# Patient Record
Sex: MALE | Race: WHITE | NOT HISPANIC OR LATINO | Employment: OTHER | URBAN - METROPOLITAN AREA
[De-identification: names, ages, dates, MRNs, and addresses within clinical notes are randomized per-mention and may not be internally consistent; named-entity substitution may affect disease eponyms.]

---

## 2017-02-28 ENCOUNTER — COMMUNICATION - HEALTHEAST (OUTPATIENT)
Dept: FAMILY MEDICINE | Facility: CLINIC | Age: 67
End: 2017-02-28

## 2017-02-28 DIAGNOSIS — I10 ESSENTIAL HYPERTENSION: ICD-10-CM

## 2017-03-13 ENCOUNTER — COMMUNICATION - HEALTHEAST (OUTPATIENT)
Dept: ENDOCRINOLOGY | Facility: CLINIC | Age: 67
End: 2017-03-13

## 2017-03-13 DIAGNOSIS — E11.9 TYPE 2 DIABETES MELLITUS (H): ICD-10-CM

## 2017-03-15 ENCOUNTER — RECORDS - HEALTHEAST (OUTPATIENT)
Dept: ADMINISTRATIVE | Facility: OTHER | Age: 67
End: 2017-03-15

## 2017-03-20 ENCOUNTER — OFFICE VISIT - HEALTHEAST (OUTPATIENT)
Dept: EDUCATION SERVICES | Facility: CLINIC | Age: 67
End: 2017-03-20

## 2017-03-20 DIAGNOSIS — E11.9 DIABETES (H): ICD-10-CM

## 2017-03-20 LAB — HBA1C MFR BLD: 10.9 % (ref 3.5–6)

## 2017-03-21 ENCOUNTER — OFFICE VISIT - HEALTHEAST (OUTPATIENT)
Dept: INTERNAL MEDICINE | Facility: CLINIC | Age: 67
End: 2017-03-21

## 2017-03-21 DIAGNOSIS — E11.9 TYPE 2 DIABETES MELLITUS WITHOUT COMPLICATION, WITHOUT LONG-TERM CURRENT USE OF INSULIN (H): ICD-10-CM

## 2017-03-22 ENCOUNTER — COMMUNICATION - HEALTHEAST (OUTPATIENT)
Dept: FAMILY MEDICINE | Facility: CLINIC | Age: 67
End: 2017-03-22

## 2017-04-11 ENCOUNTER — OFFICE VISIT - HEALTHEAST (OUTPATIENT)
Dept: INTERNAL MEDICINE | Facility: CLINIC | Age: 67
End: 2017-04-11

## 2017-04-11 DIAGNOSIS — I10 ESSENTIAL HYPERTENSION: ICD-10-CM

## 2017-04-11 DIAGNOSIS — Z00.00 HEALTHCARE MAINTENANCE: ICD-10-CM

## 2017-04-11 DIAGNOSIS — E11.9 T2DM (TYPE 2 DIABETES MELLITUS) (H): ICD-10-CM

## 2017-04-11 DIAGNOSIS — E11.9 TYPE 2 DIABETES MELLITUS WITHOUT COMPLICATION, WITHOUT LONG-TERM CURRENT USE OF INSULIN (H): ICD-10-CM

## 2017-04-11 DIAGNOSIS — N40.0 BPH (BENIGN PROSTATIC HYPERPLASIA): ICD-10-CM

## 2017-04-18 ENCOUNTER — RECORDS - HEALTHEAST (OUTPATIENT)
Dept: ADMINISTRATIVE | Facility: OTHER | Age: 67
End: 2017-04-18

## 2017-05-03 ENCOUNTER — COMMUNICATION - HEALTHEAST (OUTPATIENT)
Dept: FAMILY MEDICINE | Facility: CLINIC | Age: 67
End: 2017-05-03

## 2017-05-03 DIAGNOSIS — E11.9 TYPE 2 DIABETES MELLITUS WITHOUT COMPLICATION, WITHOUT LONG-TERM CURRENT USE OF INSULIN (H): ICD-10-CM

## 2017-06-16 ENCOUNTER — COMMUNICATION - HEALTHEAST (OUTPATIENT)
Dept: FAMILY MEDICINE | Facility: CLINIC | Age: 67
End: 2017-06-16

## 2017-06-16 DIAGNOSIS — I10 ESSENTIAL HYPERTENSION: ICD-10-CM

## 2017-06-20 ENCOUNTER — OFFICE VISIT - HEALTHEAST (OUTPATIENT)
Dept: FAMILY MEDICINE | Facility: CLINIC | Age: 67
End: 2017-06-20

## 2017-06-20 DIAGNOSIS — E11.9 TYPE 2 DIABETES MELLITUS WITHOUT COMPLICATION, WITHOUT LONG-TERM CURRENT USE OF INSULIN (H): ICD-10-CM

## 2017-06-20 DIAGNOSIS — Z97.4 WEARS HEARING AID: ICD-10-CM

## 2017-06-20 DIAGNOSIS — I10 ESSENTIAL HYPERTENSION: ICD-10-CM

## 2017-06-20 DIAGNOSIS — E78.5 HYPERLIPIDEMIA: ICD-10-CM

## 2017-06-20 DIAGNOSIS — E55.9 VITAMIN D DEFICIENCY: ICD-10-CM

## 2017-06-20 LAB
CHOLEST SERPL-MCNC: 124 MG/DL
FASTING STATUS PATIENT QL REPORTED: YES
HBA1C MFR BLD: 7 % (ref 3.5–6)
HDLC SERPL-MCNC: 47 MG/DL
LDLC SERPL CALC-MCNC: 65 MG/DL
TRIGL SERPL-MCNC: 58 MG/DL

## 2017-06-20 ASSESSMENT — MIFFLIN-ST. JEOR: SCORE: 1814.52

## 2017-06-20 NOTE — ASSESSMENT & PLAN NOTE
Diabetes is improvingwith treatment.   Diabetes educator referral.  Nutritionist referral.  Diabetes will be reassessed in 3 months.  Morning blood sugars are consistently over 150.    Lab Results   Component  Date    HGBA1C 7.0 (H) 06/20/2017      Continue metformin 1000 mg po bid  Continue glizipide 24 hr tab 10 mg po qday  Increase lantus (started by Dr. Sabillon in past few months) from 14 units at night to 16 units at night.  Follow up every three months, sooner if problems or concerns.   He states he has trouble remembering to come in every three months so I will ask [facility] to become involved and help him to remember to come in every three months.     Diabetes ed consult ordere  Dietician consult ordered.   Eye exam was just done in the past month.   Due for zoster vax and pneumovax and diabetic foot exam at follow up.

## 2017-06-20 NOTE — ASSESSMENT & PLAN NOTE
Hypertension is unchanged.  Weight loss. strongly recommended.   Blood pressure will be reassessed in 3 months.    cmp and cbc ordered. tsh ordered.   Recommend increase lisinopril from 10mg po q day to 20 mg po q day.  Continue norvasc 10 mg po q day.

## 2017-07-05 ENCOUNTER — COMMUNICATION - HEALTHEAST (OUTPATIENT)
Dept: FAMILY MEDICINE | Facility: CLINIC | Age: 67
End: 2017-07-05

## 2017-07-12 ENCOUNTER — COMMUNICATION - HEALTHEAST (OUTPATIENT)
Dept: FAMILY MEDICINE | Facility: CLINIC | Age: 67
End: 2017-07-12

## 2017-07-14 ENCOUNTER — COMMUNICATION - HEALTHEAST (OUTPATIENT)
Dept: FAMILY MEDICINE | Facility: CLINIC | Age: 67
End: 2017-07-14

## 2017-08-02 ENCOUNTER — COMMUNICATION - HEALTHEAST (OUTPATIENT)
Dept: FAMILY MEDICINE | Facility: CLINIC | Age: 67
End: 2017-08-02

## 2017-08-02 DIAGNOSIS — E11.9 TYPE 2 DIABETES MELLITUS WITHOUT COMPLICATION, WITHOUT LONG-TERM CURRENT USE OF INSULIN (H): ICD-10-CM

## 2017-08-03 ENCOUNTER — COMMUNICATION - HEALTHEAST (OUTPATIENT)
Dept: FAMILY MEDICINE | Facility: CLINIC | Age: 67
End: 2017-08-03

## 2017-08-03 DIAGNOSIS — E11.9 TYPE 2 DIABETES MELLITUS WITHOUT COMPLICATION (H): ICD-10-CM

## 2017-08-14 ENCOUNTER — COMMUNICATION - HEALTHEAST (OUTPATIENT)
Dept: FAMILY MEDICINE | Facility: CLINIC | Age: 67
End: 2017-08-14

## 2017-08-14 DIAGNOSIS — E78.5 HYPERLIPIDEMIA: ICD-10-CM

## 2017-09-06 ENCOUNTER — COMMUNICATION - HEALTHEAST (OUTPATIENT)
Dept: NURSING | Facility: CLINIC | Age: 67
End: 2017-09-06

## 2017-09-13 ENCOUNTER — COMMUNICATION - HEALTHEAST (OUTPATIENT)
Dept: FAMILY MEDICINE | Facility: CLINIC | Age: 67
End: 2017-09-13

## 2017-09-13 DIAGNOSIS — I10 ESSENTIAL HYPERTENSION: ICD-10-CM

## 2017-09-28 ENCOUNTER — COMMUNICATION - HEALTHEAST (OUTPATIENT)
Dept: FAMILY MEDICINE | Facility: CLINIC | Age: 67
End: 2017-09-28

## 2017-09-28 DIAGNOSIS — E11.9 TYPE 2 DIABETES MELLITUS WITHOUT COMPLICATION, WITHOUT LONG-TERM CURRENT USE OF INSULIN (H): ICD-10-CM

## 2017-10-25 ENCOUNTER — OFFICE VISIT - HEALTHEAST (OUTPATIENT)
Dept: FAMILY MEDICINE | Facility: CLINIC | Age: 67
End: 2017-10-25

## 2017-10-25 DIAGNOSIS — I10 ESSENTIAL HYPERTENSION: ICD-10-CM

## 2017-10-25 DIAGNOSIS — N40.0 BPH (BENIGN PROSTATIC HYPERPLASIA): ICD-10-CM

## 2017-10-25 DIAGNOSIS — M79.642 PAIN IN BOTH HANDS: ICD-10-CM

## 2017-10-25 DIAGNOSIS — M79.641 PAIN IN BOTH HANDS: ICD-10-CM

## 2017-10-25 DIAGNOSIS — E11.9 TYPE 2 DIABETES MELLITUS WITHOUT COMPLICATION, WITHOUT LONG-TERM CURRENT USE OF INSULIN (H): ICD-10-CM

## 2017-10-25 LAB — HBA1C MFR BLD: 6.5 % (ref 3.5–6)

## 2017-10-25 ASSESSMENT — MIFFLIN-ST. JEOR: SCORE: 1832.67

## 2017-10-25 NOTE — ASSESSMENT & PLAN NOTE
A1c has improved from 7.0-6.5.  He is currently taking metformin 1000 mrem twice daily, glipizide 10 mg 24-hour tablet daily and Lantus 14-16 units daily based on his blood sugar levels.      He says the Lantus is costly and would like to switch over or decrease the amount of Lantus if possible to save money.    I think we can optimize his metformin by increasing the dose.  Here are the directions for that:  Take metformin as follows to use up current immediate release tablets and then switch to extended release tabs.    Metformin 1500mg orally per day in the morning and 1000 mg orally per day in the evening    After one week if no side effects increase to     Metformin 1500 mg orally in the morning and Metformin 1500 mg orally in the evening.    After one more week if no side effects increase to:      Metformin 2000 mg orally in the morning and Metformin 2000 mg orally in the evening.    Then when you are out of immediate release pills you can switch to     Metformin 500mg xr tabs (extended release) 4 tabs once daily in the mornings. (total 2000 mg xr daily)    Watch your blood sugars carefully and if they are low we will need to adjust your lantus dose so call use if that happens.    I do not think he seen the dietitian this year but we can talk about that at his follow-up visit.    His diabetic foot exam was completed today.  He is to follow-up in 3 months.    He is due for a pneumonia shot April2018.  He is due for zoster vaccine and should check with his insurance as to where is the best place to get it for to be covered most adequately.    He did see diabetes education in March 2017 and is due for that again in March 2018.  He also had a diabetic eye exam done in May 2017 and will be due for that again in May 2018.

## 2017-10-25 NOTE — ASSESSMENT & PLAN NOTE
BP Readings from Last 3 Encounters:   10/25/17 128/84   06/20/17 136/80   04/11/17 144/84     continue  Lisinopril 20 mg po q day  And   norvasc 10 mg po q day

## 2017-11-09 ENCOUNTER — COMMUNICATION - HEALTHEAST (OUTPATIENT)
Dept: FAMILY MEDICINE | Facility: CLINIC | Age: 67
End: 2017-11-09

## 2017-11-21 ENCOUNTER — COMMUNICATION - HEALTHEAST (OUTPATIENT)
Dept: FAMILY MEDICINE | Facility: CLINIC | Age: 67
End: 2017-11-21

## 2017-12-11 ENCOUNTER — COMMUNICATION - HEALTHEAST (OUTPATIENT)
Dept: FAMILY MEDICINE | Facility: CLINIC | Age: 67
End: 2017-12-11

## 2017-12-15 ENCOUNTER — COMMUNICATION - HEALTHEAST (OUTPATIENT)
Dept: FAMILY MEDICINE | Facility: CLINIC | Age: 67
End: 2017-12-15

## 2018-01-25 ENCOUNTER — COMMUNICATION - HEALTHEAST (OUTPATIENT)
Dept: FAMILY MEDICINE | Facility: CLINIC | Age: 68
End: 2018-01-25

## 2018-01-25 DIAGNOSIS — E11.9 TYPE 2 DIABETES MELLITUS WITHOUT COMPLICATION, WITHOUT LONG-TERM CURRENT USE OF INSULIN (H): ICD-10-CM

## 2018-02-06 ENCOUNTER — OFFICE VISIT - HEALTHEAST (OUTPATIENT)
Dept: FAMILY MEDICINE | Facility: CLINIC | Age: 68
End: 2018-02-06

## 2018-02-06 DIAGNOSIS — Z00.00 PREVENTATIVE HEALTH CARE: ICD-10-CM

## 2018-02-06 DIAGNOSIS — Z51.81 THERAPEUTIC DRUG MONITORING: ICD-10-CM

## 2018-02-06 DIAGNOSIS — I10 ESSENTIAL HYPERTENSION: ICD-10-CM

## 2018-02-06 DIAGNOSIS — E78.5 HYPERLIPIDEMIA: ICD-10-CM

## 2018-02-06 DIAGNOSIS — Z23 IMMUNIZATION DUE: ICD-10-CM

## 2018-02-06 DIAGNOSIS — E11.9 TYPE 2 DIABETES MELLITUS WITHOUT COMPLICATION (H): ICD-10-CM

## 2018-02-06 LAB
ANION GAP SERPL CALCULATED.3IONS-SCNC: 12 MMOL/L (ref 5–18)
BUN SERPL-MCNC: 11 MG/DL (ref 8–22)
CALCIUM SERPL-MCNC: 9.4 MG/DL (ref 8.5–10.5)
CHLORIDE BLD-SCNC: 102 MMOL/L (ref 98–107)
CO2 SERPL-SCNC: 26 MMOL/L (ref 22–31)
CREAT SERPL-MCNC: 0.75 MG/DL (ref 0.7–1.3)
GFR SERPL CREATININE-BSD FRML MDRD: >60 ML/MIN/1.73M2
GLUCOSE BLD-MCNC: 181 MG/DL (ref 70–125)
HBA1C MFR BLD: 7.6 % (ref 3.5–6)
POTASSIUM BLD-SCNC: 4.4 MMOL/L (ref 3.5–5)
SODIUM SERPL-SCNC: 140 MMOL/L (ref 136–145)

## 2018-02-06 ASSESSMENT — MIFFLIN-ST. JEOR: SCORE: 1832.67

## 2018-02-06 NOTE — ASSESSMENT & PLAN NOTE
Flu shot is given today.  He wants the Zostavax and says his insurance will cover, however we are getting a new/ improved zostervax so he is going to wait until we have that before getting it.

## 2018-02-06 NOTE — ASSESSMENT & PLAN NOTE
BP Readings from Last 3 Encounters:   02/06/18 126/80   10/25/17 128/84   06/20/17 136/80     Continue lisinopril 20 mg orally per day and continue Norvasc 10mg orally per day.

## 2018-02-06 NOTE — ASSESSMENT & PLAN NOTE
Lab Results   Component Value Date    HGBA1C 7.6 (H) 02/06/2018     a1c was 6.5 and has increased from October to 7.6 because he hadto discontinue Lantus due to cost.    Plan:   He is currently taking metformin 2000 mg once a day.  It appears he is not on extended release dosing so I will change this.    Continue glizipide 10 mg  xr daily    Start victoza - will send to pharmacy consult to help start this and optimize meds/ cost.     Improve diet, more meal planning, less refined foods.        Lab Results   Component Value Date    LDLCALC 65 06/20/2017   make sure ldl is current within the last year.  Goal less than 100 for people without CVD and less than 70 with CVD.     BP Readings from Last 3 Encounters:   02/06/18 126/80   10/25/17 128/84   06/20/17 136/80    goal lessthan 140/90  Continue lisinopril 20 mg po q day  And continue norvasc 10 mg po q day.    Statin goal - taking lipitor 80 mg po q day.     History   Smoking Status     Passive Smoke Exposure - Never Smoker   Smokeless Tobacco     Never Used        ASA - he takes this.     Lab Results   Component Value Date    MICROALBUR 6.44 (H) 03/21/2017      Plan recheck 5/2018    Eye exam within the last 12 months - done 5/2017    Foot exam - done 10/2017    Ace/ arb for renal protection - yes on lisinopril.     Diabetes ed due march 2018 (last done march 2017)    Dietician consult also ordered.

## 2018-02-07 ENCOUNTER — COMMUNICATION - HEALTHEAST (OUTPATIENT)
Dept: FAMILY MEDICINE | Facility: CLINIC | Age: 68
End: 2018-02-07

## 2018-02-08 ENCOUNTER — COMMUNICATION - HEALTHEAST (OUTPATIENT)
Dept: FAMILY MEDICINE | Facility: CLINIC | Age: 68
End: 2018-02-08

## 2018-02-23 ENCOUNTER — COMMUNICATION - HEALTHEAST (OUTPATIENT)
Dept: FAMILY MEDICINE | Facility: CLINIC | Age: 68
End: 2018-02-23

## 2018-03-01 ENCOUNTER — COMMUNICATION - HEALTHEAST (OUTPATIENT)
Dept: FAMILY MEDICINE | Facility: CLINIC | Age: 68
End: 2018-03-01

## 2018-03-10 ENCOUNTER — COMMUNICATION - HEALTHEAST (OUTPATIENT)
Dept: FAMILY MEDICINE | Facility: CLINIC | Age: 68
End: 2018-03-10

## 2018-03-10 DIAGNOSIS — I10 ESSENTIAL HYPERTENSION: ICD-10-CM

## 2018-05-12 ENCOUNTER — COMMUNICATION - HEALTHEAST (OUTPATIENT)
Dept: FAMILY MEDICINE | Facility: CLINIC | Age: 68
End: 2018-05-12

## 2018-05-12 DIAGNOSIS — E11.9 TYPE 2 DIABETES MELLITUS WITHOUT COMPLICATION (H): ICD-10-CM

## 2018-05-22 ENCOUNTER — COMMUNICATION - HEALTHEAST (OUTPATIENT)
Dept: FAMILY MEDICINE | Facility: CLINIC | Age: 68
End: 2018-05-22

## 2018-05-22 DIAGNOSIS — E11.9 TYPE 2 DIABETES MELLITUS WITHOUT COMPLICATION, WITHOUT LONG-TERM CURRENT USE OF INSULIN (H): ICD-10-CM

## 2018-06-10 ENCOUNTER — COMMUNICATION - HEALTHEAST (OUTPATIENT)
Dept: FAMILY MEDICINE | Facility: CLINIC | Age: 68
End: 2018-06-10

## 2018-06-10 DIAGNOSIS — I10 ESSENTIAL HYPERTENSION: ICD-10-CM

## 2018-07-02 ENCOUNTER — COMMUNICATION - HEALTHEAST (OUTPATIENT)
Dept: FAMILY MEDICINE | Facility: CLINIC | Age: 68
End: 2018-07-02

## 2018-07-17 ENCOUNTER — OFFICE VISIT - HEALTHEAST (OUTPATIENT)
Dept: FAMILY MEDICINE | Facility: CLINIC | Age: 68
End: 2018-07-17

## 2018-07-17 DIAGNOSIS — E11.9 TYPE 2 DIABETES MELLITUS WITHOUT COMPLICATION, WITHOUT LONG-TERM CURRENT USE OF INSULIN (H): ICD-10-CM

## 2018-07-17 DIAGNOSIS — Z23 NEED FOR PNEUMOCOCCAL VACCINATION: ICD-10-CM

## 2018-07-17 DIAGNOSIS — Z00.00 PREVENTATIVE HEALTH CARE: ICD-10-CM

## 2018-07-17 DIAGNOSIS — Z13.29 SCREENING FOR THYROID DISORDER: ICD-10-CM

## 2018-07-17 DIAGNOSIS — E78.5 HYPERLIPIDEMIA: ICD-10-CM

## 2018-07-17 DIAGNOSIS — E55.9 VITAMIN D DEFICIENCY: ICD-10-CM

## 2018-07-17 DIAGNOSIS — I10 ESSENTIAL HYPERTENSION: ICD-10-CM

## 2018-07-17 LAB
ALBUMIN SERPL-MCNC: 4.1 G/DL (ref 3.5–5)
ALP SERPL-CCNC: 77 U/L (ref 45–120)
ALT SERPL W P-5'-P-CCNC: 24 U/L (ref 0–45)
ANION GAP SERPL CALCULATED.3IONS-SCNC: 13 MMOL/L (ref 5–18)
AST SERPL W P-5'-P-CCNC: 18 U/L (ref 0–40)
BASOPHILS # BLD AUTO: 0 THOU/UL (ref 0–0.2)
BASOPHILS NFR BLD AUTO: 1 % (ref 0–2)
BILIRUB SERPL-MCNC: 0.9 MG/DL (ref 0–1)
BUN SERPL-MCNC: 14 MG/DL (ref 8–22)
CALCIUM SERPL-MCNC: 9.2 MG/DL (ref 8.5–10.5)
CHLORIDE BLD-SCNC: 105 MMOL/L (ref 98–107)
CHOLEST SERPL-MCNC: 124 MG/DL
CO2 SERPL-SCNC: 22 MMOL/L (ref 22–31)
CREAT SERPL-MCNC: 0.81 MG/DL (ref 0.7–1.3)
EOSINOPHIL # BLD AUTO: 0.1 THOU/UL (ref 0–0.4)
EOSINOPHIL NFR BLD AUTO: 2 % (ref 0–6)
ERYTHROCYTE [DISTWIDTH] IN BLOOD BY AUTOMATED COUNT: 10.9 % (ref 11–14.5)
FASTING STATUS PATIENT QL REPORTED: NO
GFR SERPL CREATININE-BSD FRML MDRD: >60 ML/MIN/1.73M2
GLUCOSE BLD-MCNC: 233 MG/DL (ref 70–125)
HBA1C MFR BLD: 9.1 % (ref 3.5–6)
HCT VFR BLD AUTO: 44.4 % (ref 40–54)
HDLC SERPL-MCNC: 45 MG/DL
HGB BLD-MCNC: 15.4 G/DL (ref 14–18)
LDLC SERPL CALC-MCNC: 67 MG/DL
LYMPHOCYTES # BLD AUTO: 2.1 THOU/UL (ref 0.8–4.4)
LYMPHOCYTES NFR BLD AUTO: 36 % (ref 20–40)
MCH RBC QN AUTO: 31.9 PG (ref 27–34)
MCHC RBC AUTO-ENTMCNC: 34.6 G/DL (ref 32–36)
MCV RBC AUTO: 92 FL (ref 80–100)
MONOCYTES # BLD AUTO: 0.5 THOU/UL (ref 0–0.9)
MONOCYTES NFR BLD AUTO: 9 % (ref 2–10)
NEUTROPHILS # BLD AUTO: 3 THOU/UL (ref 2–7.7)
NEUTROPHILS NFR BLD AUTO: 52 % (ref 50–70)
PLATELET # BLD AUTO: 198 THOU/UL (ref 140–440)
PMV BLD AUTO: 7.6 FL (ref 7–10)
POTASSIUM BLD-SCNC: 4.3 MMOL/L (ref 3.5–5)
PROT SERPL-MCNC: 6.9 G/DL (ref 6–8)
RBC # BLD AUTO: 4.82 MILL/UL (ref 4.4–6.2)
SODIUM SERPL-SCNC: 140 MMOL/L (ref 136–145)
TRIGL SERPL-MCNC: 60 MG/DL
TSH SERPL DL<=0.005 MIU/L-ACNC: 1.81 UIU/ML (ref 0.3–5)
WBC: 5.8 THOU/UL (ref 4–11)

## 2018-07-17 ASSESSMENT — MIFFLIN-ST. JEOR: SCORE: 1796.38

## 2018-07-17 NOTE — ASSESSMENT & PLAN NOTE
I have had an Advance Directives discussion with the patient.   Pneumovax 23 valent was given today  Zostavax was discussed and he will check with his insurance on cost

## 2018-07-17 NOTE — ASSESSMENT & PLAN NOTE
Lab Results   Component Value Date    HGBA1C 9.1 (H) 07/17/2018     a1c was 6.5 then went up to 7.6 when he discontinued Lantus due to cost.  Last time we visited I recommended Victoza but he did not start that.  His A1c is now up to 9.1 and so I have again recommended starting Victoza.  We did discuss that if his blood sugars continued to deteriorate he will probably end up in the hospital.  If Victoza is cost prohibitive we could consider Trulicity, Jardiance or something else we couldalso consider mealtime insulins.    Continue metformin 2000 mg once daily   Continue glizipide 10 mg xr daily.   Start victoza he says he already has some and knows how to take it.   Wants to start eating at home more because they eat out a lot.  Follow-up in 1 -3 months.  If not improving would recommend endocrinology consult.      Lab Results   Component Value Date    LDLCALC 65 06/20/2017    make sure ldl is current within the last year.  Goal less than 100 for people without CVD and less than 70 with CVD.     Repeat lipid ordered 7/17/2018     BP Readings from Last 3 Encounters:   07/17/18 122/80   02/06/18 126/80   10/25/17 128/84   at goal: less than 140/90  Continue lisinopril 20 mg po q day  And continue norvasc 10 mg po q day.     Statin goal - taking lipitor 80 mg po q day.     History   Smoking Status     Passive Smoke Exposure - Never Smoker   Smokeless Tobacco     Never Used        ASA  -she does take this    Lab Results   Component Value Date     6.44 (H) 03/21/2017      microalbumin on file every 12 months.  Ordered again 7/17/2018     Eye exam within the last 12 months his last exam was 5/2017 and he is due.  This was ordered today.    Foot exam done 10/2017    Ace/ arb for renal protection - he is on lisinopril.    Dietician Ho Avila RD - consult ordered    Diabetes ed due - ordered again.    Zoster vax due and discussed   Pneumovax 23 valent given today.

## 2018-07-17 NOTE — ASSESSMENT & PLAN NOTE
BP Readings from Last 3 Encounters:   07/17/18 122/80   02/06/18 126/80   10/25/17 128/84      Continue lisinopril 20 mg po q day and norvasc 10 mg po q day.

## 2018-07-18 LAB
25(OH)D3 SERPL-MCNC: 41.2 NG/ML (ref 30–80)
25(OH)D3 SERPL-MCNC: 41.2 NG/ML (ref 30–80)

## 2018-07-21 ENCOUNTER — COMMUNICATION - HEALTHEAST (OUTPATIENT)
Dept: FAMILY MEDICINE | Facility: CLINIC | Age: 68
End: 2018-07-21

## 2018-07-21 DIAGNOSIS — N40.0 BPH (BENIGN PROSTATIC HYPERPLASIA): ICD-10-CM

## 2018-07-30 ENCOUNTER — RECORDS - HEALTHEAST (OUTPATIENT)
Dept: ADMINISTRATIVE | Facility: OTHER | Age: 68
End: 2018-07-30

## 2018-08-11 ENCOUNTER — COMMUNICATION - HEALTHEAST (OUTPATIENT)
Dept: FAMILY MEDICINE | Facility: CLINIC | Age: 68
End: 2018-08-11

## 2018-08-11 DIAGNOSIS — E11.9 TYPE 2 DIABETES MELLITUS WITHOUT COMPLICATION, WITHOUT LONG-TERM CURRENT USE OF INSULIN (H): ICD-10-CM

## 2018-08-11 DIAGNOSIS — E78.5 HYPERLIPIDEMIA: ICD-10-CM

## 2018-08-15 ENCOUNTER — COMMUNICATION - HEALTHEAST (OUTPATIENT)
Dept: FAMILY MEDICINE | Facility: CLINIC | Age: 68
End: 2018-08-15

## 2018-08-28 ENCOUNTER — COMMUNICATION - HEALTHEAST (OUTPATIENT)
Dept: FAMILY MEDICINE | Facility: CLINIC | Age: 68
End: 2018-08-28

## 2018-08-28 DIAGNOSIS — E11.9 TYPE 2 DIABETES MELLITUS WITHOUT COMPLICATION, WITHOUT LONG-TERM CURRENT USE OF INSULIN (H): ICD-10-CM

## 2018-09-29 ENCOUNTER — COMMUNICATION - HEALTHEAST (OUTPATIENT)
Dept: FAMILY MEDICINE | Facility: CLINIC | Age: 68
End: 2018-09-29

## 2018-09-29 DIAGNOSIS — I10 ESSENTIAL HYPERTENSION: ICD-10-CM

## 2018-11-17 ENCOUNTER — COMMUNICATION - HEALTHEAST (OUTPATIENT)
Dept: FAMILY MEDICINE | Facility: CLINIC | Age: 68
End: 2018-11-17

## 2018-11-17 DIAGNOSIS — E78.5 HYPERLIPIDEMIA: ICD-10-CM

## 2018-11-17 DIAGNOSIS — E11.9 TYPE 2 DIABETES MELLITUS WITHOUT COMPLICATION (H): ICD-10-CM

## 2018-11-17 DIAGNOSIS — E11.9 TYPE 2 DIABETES MELLITUS WITHOUT COMPLICATION, WITHOUT LONG-TERM CURRENT USE OF INSULIN (H): ICD-10-CM

## 2018-11-28 ENCOUNTER — OFFICE VISIT - HEALTHEAST (OUTPATIENT)
Dept: FAMILY MEDICINE | Facility: CLINIC | Age: 68
End: 2018-11-28

## 2018-11-28 DIAGNOSIS — E11.9 TYPE 2 DIABETES MELLITUS WITHOUT COMPLICATION, WITHOUT LONG-TERM CURRENT USE OF INSULIN (H): ICD-10-CM

## 2018-11-28 DIAGNOSIS — E78.5 HYPERLIPIDEMIA, UNSPECIFIED HYPERLIPIDEMIA TYPE: ICD-10-CM

## 2018-11-28 DIAGNOSIS — I10 ESSENTIAL HYPERTENSION: ICD-10-CM

## 2018-11-28 DIAGNOSIS — E55.9 VITAMIN D DEFICIENCY: ICD-10-CM

## 2018-11-28 LAB
ALBUMIN SERPL-MCNC: 4 G/DL (ref 3.5–5)
ALP SERPL-CCNC: 77 U/L (ref 45–120)
ALT SERPL W P-5'-P-CCNC: 16 U/L (ref 0–45)
ANION GAP SERPL CALCULATED.3IONS-SCNC: 8 MMOL/L (ref 5–18)
AST SERPL W P-5'-P-CCNC: 17 U/L (ref 0–40)
BILIRUB SERPL-MCNC: 0.6 MG/DL (ref 0–1)
BUN SERPL-MCNC: 14 MG/DL (ref 8–22)
CALCIUM SERPL-MCNC: 10 MG/DL (ref 8.5–10.5)
CHLORIDE BLD-SCNC: 103 MMOL/L (ref 98–107)
CO2 SERPL-SCNC: 28 MMOL/L (ref 22–31)
CREAT SERPL-MCNC: 0.73 MG/DL (ref 0.7–1.3)
CREAT UR-MCNC: 117.8 MG/DL
GFR SERPL CREATININE-BSD FRML MDRD: >60 ML/MIN/1.73M2
GLUCOSE BLD-MCNC: 119 MG/DL (ref 70–125)
HBA1C MFR BLD: 7.1 % (ref 3.5–6)
MICROALBUMIN UR-MCNC: 3.83 MG/DL (ref 0–1.99)
MICROALBUMIN/CREAT UR: 32.5 MG/G
POTASSIUM BLD-SCNC: 4.6 MMOL/L (ref 3.5–5)
PROT SERPL-MCNC: 7.2 G/DL (ref 6–8)
SODIUM SERPL-SCNC: 139 MMOL/L (ref 136–145)

## 2018-11-28 ASSESSMENT — MIFFLIN-ST. JEOR: SCORE: 1809.99

## 2018-11-28 NOTE — ASSESSMENT & PLAN NOTE
Results   Component Value Date    HGBA1C 7.1 (H) 11/28/2018   Continue metformin 2000 mg once daily   Continue glizipide 10 mg xr daily.   He is taking the lowest dose of Victoza which is his wife's leftover Victoza due to cost issue right now.  He is hoping insurance will cover it in the new year.  If not he would like to switch to Trulicity.    Lab Results   Component Value Date    LDLCALC 67 07/17/2018   make sure ldl is current within the last year.  Goal less than 100 for people without CVD and less than 70 with CVD.     BP Readings from Last 3 Encounters:   11/28/18 124/84   07/17/18 122/80   02/06/18 126/80   goal less than 140/90  Continue lisinopril 20 mg po q day  And continue norvasc 10 mg po q day.    Statin goal - taking lipitor 80 mg po q day.     Social History     Tobacco Use   Smoking Status Passive Smoke Exposure - Never Smoker   Smokeless Tobacco Never Used        ASA  - taking    Lab Results   Component Value Date    MICROALBUR 6.44 (H) 03/21/2017      microalbumin on file every 12 months. Ordered today.     Eye exam within the last 12 months - done 8/7/2018    Foot exam done 10/2017    Ace/ arb for renal protection - takes lisinopril 20 mg po q day    Dietician offered. Declined.

## 2018-11-28 NOTE — ASSESSMENT & PLAN NOTE
Vitamin D, Total (25-Hydroxy)   Date Value Ref Range Status   07/17/2018 41.2 30.0 - 80.0 ng/mL Final

## 2018-12-10 ENCOUNTER — AMBULATORY - HEALTHEAST (OUTPATIENT)
Dept: FAMILY MEDICINE | Facility: CLINIC | Age: 68
End: 2018-12-10

## 2018-12-10 DIAGNOSIS — E11.29 MICROALBUMINURIA DUE TO TYPE 2 DIABETES MELLITUS (H): ICD-10-CM

## 2018-12-10 DIAGNOSIS — R80.9 MICROALBUMINURIA DUE TO TYPE 2 DIABETES MELLITUS (H): ICD-10-CM

## 2018-12-20 ENCOUNTER — COMMUNICATION - HEALTHEAST (OUTPATIENT)
Dept: FAMILY MEDICINE | Facility: CLINIC | Age: 68
End: 2018-12-20

## 2018-12-20 DIAGNOSIS — E11.9 TYPE 2 DIABETES MELLITUS WITHOUT COMPLICATION (H): ICD-10-CM

## 2018-12-20 DIAGNOSIS — E11.9 TYPE 2 DIABETES MELLITUS WITHOUT COMPLICATION, WITHOUT LONG-TERM CURRENT USE OF INSULIN (H): ICD-10-CM

## 2019-01-02 ENCOUNTER — COMMUNICATION - HEALTHEAST (OUTPATIENT)
Dept: FAMILY MEDICINE | Facility: CLINIC | Age: 69
End: 2019-01-02

## 2019-03-20 ENCOUNTER — COMMUNICATION - HEALTHEAST (OUTPATIENT)
Dept: FAMILY MEDICINE | Facility: CLINIC | Age: 69
End: 2019-03-20

## 2019-03-20 DIAGNOSIS — I10 ESSENTIAL HYPERTENSION: ICD-10-CM

## 2019-04-30 ENCOUNTER — COMMUNICATION - HEALTHEAST (OUTPATIENT)
Dept: FAMILY MEDICINE | Facility: CLINIC | Age: 69
End: 2019-04-30

## 2019-07-24 ENCOUNTER — COMMUNICATION - HEALTHEAST (OUTPATIENT)
Dept: FAMILY MEDICINE | Facility: CLINIC | Age: 69
End: 2019-07-24

## 2019-07-24 DIAGNOSIS — N40.0 BPH (BENIGN PROSTATIC HYPERPLASIA): ICD-10-CM

## 2019-07-24 DIAGNOSIS — E78.5 HYPERLIPIDEMIA: ICD-10-CM

## 2019-09-23 ENCOUNTER — COMMUNICATION - HEALTHEAST (OUTPATIENT)
Dept: FAMILY MEDICINE | Facility: CLINIC | Age: 69
End: 2019-09-23

## 2019-09-23 DIAGNOSIS — I10 ESSENTIAL HYPERTENSION: ICD-10-CM

## 2019-10-24 ENCOUNTER — COMMUNICATION - HEALTHEAST (OUTPATIENT)
Dept: FAMILY MEDICINE | Facility: CLINIC | Age: 69
End: 2019-10-24

## 2019-10-24 DIAGNOSIS — N40.0 BPH (BENIGN PROSTATIC HYPERPLASIA): ICD-10-CM

## 2019-11-01 ENCOUNTER — COMMUNICATION - HEALTHEAST (OUTPATIENT)
Dept: FAMILY MEDICINE | Facility: CLINIC | Age: 69
End: 2019-11-01

## 2019-11-27 ENCOUNTER — OFFICE VISIT - HEALTHEAST (OUTPATIENT)
Dept: FAMILY MEDICINE | Facility: CLINIC | Age: 69
End: 2019-11-27

## 2019-11-27 DIAGNOSIS — Z11.59 ENCOUNTER FOR HEPATITIS C SCREENING TEST FOR LOW RISK PATIENT: ICD-10-CM

## 2019-11-27 DIAGNOSIS — E78.5 HYPERLIPIDEMIA: ICD-10-CM

## 2019-11-27 DIAGNOSIS — E11.9 TYPE 2 DIABETES MELLITUS WITHOUT COMPLICATION, WITHOUT LONG-TERM CURRENT USE OF INSULIN (H): ICD-10-CM

## 2019-11-27 DIAGNOSIS — Z23 FLU VACCINE NEED: ICD-10-CM

## 2019-11-27 DIAGNOSIS — Z00.01 ENCOUNTER FOR GENERAL ADULT MEDICAL EXAMINATION WITH ABNORMAL FINDINGS: ICD-10-CM

## 2019-11-27 DIAGNOSIS — R41.89 COGNITIVE DEFICITS: ICD-10-CM

## 2019-11-27 DIAGNOSIS — Z12.11 SCREEN FOR COLON CANCER: ICD-10-CM

## 2019-11-27 DIAGNOSIS — D50.9 MICROCYTIC ANEMIA: ICD-10-CM

## 2019-11-27 DIAGNOSIS — E78.5 HYPERLIPIDEMIA, UNSPECIFIED HYPERLIPIDEMIA TYPE: ICD-10-CM

## 2019-11-27 DIAGNOSIS — N20.0 CALCULUS OF KIDNEY: ICD-10-CM

## 2019-11-27 DIAGNOSIS — E55.9 VITAMIN D DEFICIENCY: ICD-10-CM

## 2019-11-27 DIAGNOSIS — I10 ESSENTIAL HYPERTENSION: ICD-10-CM

## 2019-11-27 DIAGNOSIS — Z13.0 SCREENING, ANEMIA, DEFICIENCY, IRON: ICD-10-CM

## 2019-11-27 LAB
ALBUMIN SERPL-MCNC: 3.7 G/DL (ref 3.5–5)
ALP SERPL-CCNC: 83 U/L (ref 45–120)
ALT SERPL W P-5'-P-CCNC: 16 U/L (ref 0–45)
ANION GAP SERPL CALCULATED.3IONS-SCNC: 10 MMOL/L (ref 5–18)
AST SERPL W P-5'-P-CCNC: 15 U/L (ref 0–40)
BASOPHILS # BLD AUTO: 0 THOU/UL (ref 0–0.2)
BASOPHILS NFR BLD AUTO: 0 % (ref 0–2)
BILIRUB SERPL-MCNC: 0.4 MG/DL (ref 0–1)
BUN SERPL-MCNC: 18 MG/DL (ref 8–22)
CALCIUM SERPL-MCNC: 8.9 MG/DL (ref 8.5–10.5)
CHLORIDE BLD-SCNC: 105 MMOL/L (ref 98–107)
CHOLEST SERPL-MCNC: 109 MG/DL
CO2 SERPL-SCNC: 22 MMOL/L (ref 22–31)
CREAT SERPL-MCNC: 0.79 MG/DL (ref 0.7–1.3)
CREAT UR-MCNC: 84.7 MG/DL
EOSINOPHIL # BLD AUTO: 0.2 THOU/UL (ref 0–0.4)
EOSINOPHIL NFR BLD AUTO: 3 % (ref 0–6)
ERYTHROCYTE [DISTWIDTH] IN BLOOD BY AUTOMATED COUNT: 12.9 % (ref 11–14.5)
FASTING STATUS PATIENT QL REPORTED: YES
GFR SERPL CREATININE-BSD FRML MDRD: >60 ML/MIN/1.73M2
GLUCOSE BLD-MCNC: 153 MG/DL (ref 70–125)
HBA1C MFR BLD: 8.4 % (ref 3.5–6)
HCT VFR BLD AUTO: 25 % (ref 40–54)
HDLC SERPL-MCNC: 47 MG/DL
HGB BLD-MCNC: 8.1 G/DL (ref 14–18)
LDLC SERPL CALC-MCNC: 53 MG/DL
LYMPHOCYTES # BLD AUTO: 2.3 THOU/UL (ref 0.8–4.4)
LYMPHOCYTES NFR BLD AUTO: 30 % (ref 20–40)
MCH RBC QN AUTO: 23 PG (ref 27–34)
MCHC RBC AUTO-ENTMCNC: 32.2 G/DL (ref 32–36)
MCV RBC AUTO: 71 FL (ref 80–100)
MICROALBUMIN UR-MCNC: 1.44 MG/DL (ref 0–1.99)
MICROALBUMIN/CREAT UR: 17 MG/G
MONOCYTES # BLD AUTO: 0.7 THOU/UL (ref 0–0.9)
MONOCYTES NFR BLD AUTO: 9 % (ref 2–10)
NEUTROPHILS # BLD AUTO: 4.4 THOU/UL (ref 2–7.7)
NEUTROPHILS NFR BLD AUTO: 58 % (ref 50–70)
PLATELET # BLD AUTO: 319 THOU/UL (ref 140–440)
PMV BLD AUTO: 7.4 FL (ref 7–10)
POTASSIUM BLD-SCNC: 4 MMOL/L (ref 3.5–5)
PROT SERPL-MCNC: 6.8 G/DL (ref 6–8)
RBC # BLD AUTO: 3.51 MILL/UL (ref 4.4–6.2)
SODIUM SERPL-SCNC: 137 MMOL/L (ref 136–145)
TRIGL SERPL-MCNC: 46 MG/DL
VIT B12 SERPL-MCNC: 348 PG/ML (ref 213–816)
WBC: 7.7 THOU/UL (ref 4–11)

## 2019-11-27 ASSESSMENT — MIFFLIN-ST. JEOR: SCORE: 1828.13

## 2019-11-27 NOTE — ASSESSMENT & PLAN NOTE
NEW PROBLEM  Abnormal clock drawing today as part of AWV  MOCA administered. Scored 21 - very low and abnormal  Discussed neuropsych testing, neurology consult and tsh/ vit b12 testing.   He declined all due to cost except the b12 test (he is on metformin)    On 11/28 the day after he saw me, I noted that his hgb was unexpectedly 8 (previously noted to be 15 for past 9 years)    Unclear if maybe his cognitive deficits could have been due to occult anemia.   Plan to follow up on his cognitive decline in 3 months as he does not like to come to the doctor more frequently

## 2019-11-27 NOTE — ASSESSMENT & PLAN NOTE
Recheck today. Follow up in 3 months. Planning lifestyle and diet improvements, has been eating poorly lately.

## 2019-11-27 NOTE — ASSESSMENT & PLAN NOTE
a1c up from 8.4 to 7.1  Continue metformin 2000 mg po daily  Continue glizipide 10 mg po q day.   Continue trulicity 1.5 mg sq weekly  Discussed switching from trulicity to victoza    He does not want to swich to victoza, he is accompanied by his wife. They says they have been eating very poorly and have room to improve, plan to do that and recheck in 3 months.     Continue lisinopril and lipitor  Flu shot done.   Foot exam done 11/27/19  microalbumin pending  Lipid pending  cmp pending  b12 to monitor metfomrmin pending    Follow up in 3 months

## 2019-11-27 NOTE — ASSESSMENT & PLAN NOTE
Controlled.   BP Readings from Last 3 Encounters:   11/27/19 124/70   11/28/18 124/84   07/17/18 122/80     Continue norvasc 10 mg po q day  Continue lisinopril 20 mg po daily

## 2019-11-27 NOTE — ASSESSMENT & PLAN NOTE
NEW AND UNEXPECTED PROBLEM DISCOVERED THE DAY AFTER I SAW ERA  See phone note. He is sent to walk in care for recheck hgb and further planning.   He says he is asymptomatic as of phone call 11/23/19 12pm  Regardless he needs colonoscopy (he is due as it has been 10 years.)

## 2019-11-28 ENCOUNTER — COMMUNICATION - HEALTHEAST (OUTPATIENT)
Dept: FAMILY MEDICINE | Facility: CLINIC | Age: 69
End: 2019-11-28

## 2019-11-28 ENCOUNTER — OFFICE VISIT - HEALTHEAST (OUTPATIENT)
Dept: FAMILY MEDICINE | Facility: CLINIC | Age: 69
End: 2019-11-28

## 2019-11-28 DIAGNOSIS — R89.9 ABNORMAL LABORATORY TEST: ICD-10-CM

## 2019-11-28 DIAGNOSIS — R42 LIGHTHEADEDNESS: ICD-10-CM

## 2019-11-28 DIAGNOSIS — D50.9 MICROCYTIC ANEMIA: ICD-10-CM

## 2019-11-28 LAB
BASOPHILS # BLD AUTO: 0 THOU/UL (ref 0–0.2)
BASOPHILS NFR BLD AUTO: 0 % (ref 0–2)
EOSINOPHIL # BLD AUTO: 0.2 THOU/UL (ref 0–0.4)
EOSINOPHIL NFR BLD AUTO: 2 % (ref 0–6)
ERYTHROCYTE [DISTWIDTH] IN BLOOD BY AUTOMATED COUNT: 13.5 % (ref 11–14.5)
FERRITIN SERPL-MCNC: 7 NG/ML (ref 27–300)
FOLATE SERPL-MCNC: 14.2 NG/ML
HCT VFR BLD AUTO: 24 % (ref 40–54)
HCV AB SERPL QL IA: NEGATIVE
HEMOCCULT SP1 STL QL: NEGATIVE
HGB BLD-MCNC: 7.7 G/DL (ref 14–18)
IRON SATN MFR SERPL: 3 % (ref 20–50)
IRON SERPL-MCNC: 15 UG/DL (ref 42–175)
LYMPHOCYTES # BLD AUTO: 1.4 THOU/UL (ref 0.8–4.4)
LYMPHOCYTES NFR BLD AUTO: 20 % (ref 20–40)
MCH RBC QN AUTO: 22.6 PG (ref 27–34)
MCHC RBC AUTO-ENTMCNC: 32.1 G/DL (ref 32–36)
MCV RBC AUTO: 71 FL (ref 80–100)
MONOCYTES # BLD AUTO: 0.6 THOU/UL (ref 0–0.9)
MONOCYTES NFR BLD AUTO: 8 % (ref 2–10)
NEUTROPHILS # BLD AUTO: 4.9 THOU/UL (ref 2–7.7)
NEUTROPHILS NFR BLD AUTO: 69 % (ref 50–70)
PLATELET # BLD AUTO: 296 THOU/UL (ref 140–440)
PMV BLD AUTO: 7.2 FL (ref 7–10)
RBC # BLD AUTO: 3.4 MILL/UL (ref 4.4–6.2)
TIBC SERPL-MCNC: 438 UG/DL (ref 313–563)
TRANSFERRIN SERPL-MCNC: 351 MG/DL (ref 212–360)
WBC: 7.1 THOU/UL (ref 4–11)

## 2019-11-29 ENCOUNTER — OFFICE VISIT - HEALTHEAST (OUTPATIENT)
Dept: FAMILY MEDICINE | Facility: CLINIC | Age: 69
End: 2019-11-29

## 2019-11-29 ENCOUNTER — COMMUNICATION - HEALTHEAST (OUTPATIENT)
Dept: FAMILY MEDICINE | Facility: CLINIC | Age: 69
End: 2019-11-29

## 2019-11-29 DIAGNOSIS — D50.9 MICROCYTIC ANEMIA: ICD-10-CM

## 2019-11-29 LAB
25(OH)D3 SERPL-MCNC: 48 NG/ML (ref 30–80)
25(OH)D3 SERPL-MCNC: 48 NG/ML (ref 30–80)
BASOPHILS # BLD AUTO: 0 THOU/UL (ref 0–0.2)
BASOPHILS NFR BLD AUTO: 0 % (ref 0–2)
EOSINOPHIL # BLD AUTO: 0.2 THOU/UL (ref 0–0.4)
EOSINOPHIL NFR BLD AUTO: 3 % (ref 0–6)
ERYTHROCYTE [DISTWIDTH] IN BLOOD BY AUTOMATED COUNT: 12.9 % (ref 11–14.5)
HCT VFR BLD AUTO: 24 % (ref 40–54)
HGB BLD-MCNC: 7.7 G/DL (ref 14–18)
LYMPHOCYTES # BLD AUTO: 2 THOU/UL (ref 0.8–4.4)
LYMPHOCYTES NFR BLD AUTO: 29 % (ref 20–40)
MCH RBC QN AUTO: 22.7 PG (ref 27–34)
MCHC RBC AUTO-ENTMCNC: 31.9 G/DL (ref 32–36)
MCV RBC AUTO: 71 FL (ref 80–100)
MONOCYTES # BLD AUTO: 0.8 THOU/UL (ref 0–0.9)
MONOCYTES NFR BLD AUTO: 12 % (ref 2–10)
NEUTROPHILS # BLD AUTO: 3.8 THOU/UL (ref 2–7.7)
NEUTROPHILS NFR BLD AUTO: 56 % (ref 50–70)
PLATELET # BLD AUTO: 302 THOU/UL (ref 140–440)
PMV BLD AUTO: 6.8 FL (ref 7–10)
RBC # BLD AUTO: 3.37 MILL/UL (ref 4.4–6.2)
WBC: 6.8 THOU/UL (ref 4–11)

## 2019-11-29 RX ORDER — FERROUS SULFATE 325(65) MG
1 TABLET ORAL DAILY
Qty: 30 TABLET | Refills: 0 | Status: SHIPPED | OUTPATIENT
Start: 2019-11-29 | End: 2023-03-06

## 2019-11-29 NOTE — ASSESSMENT & PLAN NOTE
NEW DX IRON DEFICIENCY ANEMIA  Unclear what has caused drop of hgb from 15 last year down to 7/8 now.  His hgb has been stable overthe past days, and he declines hospital visit for transfusion although I did recommend that as he is 'feeling tired all the time'.     Iron supplement given  Hematology consult to discuss possible iron infusion. I did call hematology today, but no provider was able to talk to me, so I did leave a message to ask about iron infusion.  Colonoscopy and egd to rule out gi bleed - he did have negative occult stool test yesterday.  Increase benefiber as iron supplement will likely cause constipation.

## 2019-12-03 ENCOUNTER — AMBULATORY - HEALTHEAST (OUTPATIENT)
Dept: LAB | Facility: CLINIC | Age: 69
End: 2019-12-03

## 2019-12-03 DIAGNOSIS — D50.9 ANEMIA, IRON DEFICIENCY: ICD-10-CM

## 2019-12-04 LAB
BASOPHILS # BLD AUTO: 0.1 THOU/UL (ref 0–0.2)
BASOPHILS NFR BLD AUTO: 1 % (ref 0–2)
EOSINOPHIL # BLD AUTO: 0.2 THOU/UL (ref 0–0.4)
EOSINOPHIL NFR BLD AUTO: 3 % (ref 0–6)
ERYTHROCYTE [DISTWIDTH] IN BLOOD BY AUTOMATED COUNT: 14.8 % (ref 11–14.5)
HCT VFR BLD AUTO: 26.9 % (ref 40–54)
HGB BLD-MCNC: 8.1 G/DL (ref 14–18)
LAB AP CHARGES (HE HISTORICAL CONVERSION): NORMAL
LYMPHOCYTES # BLD AUTO: 2.1 THOU/UL (ref 0.8–4.4)
LYMPHOCYTES NFR BLD AUTO: 27 % (ref 20–40)
MCH RBC QN AUTO: 22.4 PG (ref 27–34)
MCHC RBC AUTO-ENTMCNC: 30.1 G/DL (ref 32–36)
MCV RBC AUTO: 74 FL (ref 80–100)
MONOCYTES # BLD AUTO: 0.7 THOU/UL (ref 0–0.9)
MONOCYTES NFR BLD AUTO: 9 % (ref 2–10)
NEUTROPHILS # BLD AUTO: 4.7 THOU/UL (ref 2–7.7)
NEUTROPHILS NFR BLD AUTO: 61 % (ref 50–70)
OVALOCYTES: ABNORMAL
PATH REPORT.COMMENTS IMP SPEC: NORMAL
PATH REPORT.COMMENTS IMP SPEC: NORMAL
PATH REPORT.FINAL DX SPEC: NORMAL
PATH REPORT.MICROSCOPIC SPEC OTHER STN: ABNORMAL
PATH REPORT.MICROSCOPIC SPEC OTHER STN: NORMAL
PATH REPORT.RELEVANT HX SPEC: NORMAL
PLAT MORPH BLD: NORMAL
PLATELET # BLD AUTO: 335 THOU/UL (ref 140–440)
PMV BLD AUTO: 10.3 FL (ref 8.5–12.5)
POLYCHROMASIA BLD QL SMEAR: ABNORMAL
RBC # BLD AUTO: 3.62 MILL/UL (ref 4.4–6.2)
WBC: 7.7 THOU/UL (ref 4–11)

## 2019-12-20 ENCOUNTER — COMMUNICATION - HEALTHEAST (OUTPATIENT)
Dept: FAMILY MEDICINE | Facility: CLINIC | Age: 69
End: 2019-12-20

## 2019-12-20 DIAGNOSIS — E11.9 TYPE 2 DIABETES MELLITUS WITHOUT COMPLICATION, WITHOUT LONG-TERM CURRENT USE OF INSULIN (H): ICD-10-CM

## 2019-12-20 DIAGNOSIS — E11.9 TYPE 2 DIABETES MELLITUS WITHOUT COMPLICATION (H): ICD-10-CM

## 2019-12-22 ENCOUNTER — COMMUNICATION - HEALTHEAST (OUTPATIENT)
Dept: FAMILY MEDICINE | Facility: CLINIC | Age: 69
End: 2019-12-22

## 2019-12-22 DIAGNOSIS — I10 ESSENTIAL HYPERTENSION: ICD-10-CM

## 2019-12-30 ENCOUNTER — COMMUNICATION - HEALTHEAST (OUTPATIENT)
Dept: FAMILY MEDICINE | Facility: CLINIC | Age: 69
End: 2019-12-30

## 2020-01-10 ENCOUNTER — COMMUNICATION - HEALTHEAST (OUTPATIENT)
Dept: ONCOLOGY | Facility: HOSPITAL | Age: 70
End: 2020-01-10

## 2020-01-25 ENCOUNTER — COMMUNICATION - HEALTHEAST (OUTPATIENT)
Dept: FAMILY MEDICINE | Facility: CLINIC | Age: 70
End: 2020-01-25

## 2020-01-25 DIAGNOSIS — N40.0 BPH (BENIGN PROSTATIC HYPERPLASIA): ICD-10-CM

## 2020-02-25 ENCOUNTER — COMMUNICATION - HEALTHEAST (OUTPATIENT)
Dept: FAMILY MEDICINE | Facility: CLINIC | Age: 70
End: 2020-02-25

## 2020-02-25 DIAGNOSIS — E11.9 TYPE 2 DIABETES MELLITUS WITHOUT COMPLICATION, WITHOUT LONG-TERM CURRENT USE OF INSULIN (H): ICD-10-CM

## 2020-02-25 DIAGNOSIS — E78.5 HYPERLIPIDEMIA: ICD-10-CM

## 2020-03-07 ENCOUNTER — OFFICE VISIT - HEALTHEAST (OUTPATIENT)
Dept: FAMILY MEDICINE | Facility: CLINIC | Age: 70
End: 2020-03-07

## 2020-03-07 DIAGNOSIS — R10.84 ABDOMINAL PAIN, GENERALIZED: ICD-10-CM

## 2020-03-09 ENCOUNTER — COMMUNICATION - HEALTHEAST (OUTPATIENT)
Dept: UROLOGY | Facility: CLINIC | Age: 70
End: 2020-03-09

## 2020-04-17 ENCOUNTER — COMMUNICATION - HEALTHEAST (OUTPATIENT)
Dept: UROLOGY | Facility: CLINIC | Age: 70
End: 2020-04-17

## 2020-10-30 ENCOUNTER — COMMUNICATION - HEALTHEAST (OUTPATIENT)
Dept: FAMILY MEDICINE | Facility: CLINIC | Age: 70
End: 2020-10-30

## 2020-10-30 DIAGNOSIS — Z23 NEED FOR VACCINATION: ICD-10-CM

## 2020-11-10 ENCOUNTER — COMMUNICATION - HEALTHEAST (OUTPATIENT)
Dept: FAMILY MEDICINE | Facility: CLINIC | Age: 70
End: 2020-11-10

## 2020-11-10 DIAGNOSIS — E78.5 HYPERLIPIDEMIA: ICD-10-CM

## 2020-11-13 ENCOUNTER — AMBULATORY - HEALTHEAST (OUTPATIENT)
Dept: NURSING | Facility: CLINIC | Age: 70
End: 2020-11-13

## 2020-11-13 DIAGNOSIS — Z23 NEED FOR VACCINATION: ICD-10-CM

## 2020-11-14 ENCOUNTER — COMMUNICATION - HEALTHEAST (OUTPATIENT)
Dept: FAMILY MEDICINE | Facility: CLINIC | Age: 70
End: 2020-11-14

## 2020-11-14 DIAGNOSIS — E78.5 HYPERLIPIDEMIA: ICD-10-CM

## 2020-12-15 ENCOUNTER — COMMUNICATION - HEALTHEAST (OUTPATIENT)
Dept: FAMILY MEDICINE | Facility: CLINIC | Age: 70
End: 2020-12-15

## 2020-12-15 DIAGNOSIS — I10 ESSENTIAL HYPERTENSION: ICD-10-CM

## 2020-12-17 ENCOUNTER — COMMUNICATION - HEALTHEAST (OUTPATIENT)
Dept: FAMILY MEDICINE | Facility: CLINIC | Age: 70
End: 2020-12-17

## 2020-12-17 DIAGNOSIS — E78.5 HYPERLIPIDEMIA: ICD-10-CM

## 2020-12-18 ENCOUNTER — OFFICE VISIT - HEALTHEAST (OUTPATIENT)
Dept: FAMILY MEDICINE | Facility: CLINIC | Age: 70
End: 2020-12-18

## 2020-12-18 DIAGNOSIS — E78.5 HYPERLIPIDEMIA, UNSPECIFIED HYPERLIPIDEMIA TYPE: ICD-10-CM

## 2020-12-18 DIAGNOSIS — Z12.11 SCREEN FOR COLON CANCER: ICD-10-CM

## 2020-12-18 DIAGNOSIS — E55.9 VITAMIN D DEFICIENCY: ICD-10-CM

## 2020-12-18 DIAGNOSIS — I10 ESSENTIAL HYPERTENSION: ICD-10-CM

## 2020-12-18 DIAGNOSIS — E11.9 TYPE 2 DIABETES MELLITUS WITHOUT COMPLICATION, WITHOUT LONG-TERM CURRENT USE OF INSULIN (H): ICD-10-CM

## 2020-12-18 DIAGNOSIS — Z00.00 PREVENTATIVE HEALTH CARE: ICD-10-CM

## 2020-12-18 DIAGNOSIS — D50.9 MICROCYTIC ANEMIA: ICD-10-CM

## 2020-12-18 LAB
ALBUMIN SERPL-MCNC: 3.4 G/DL (ref 3.5–5)
ALP SERPL-CCNC: 88 U/L (ref 45–120)
ALT SERPL W P-5'-P-CCNC: 9 U/L (ref 0–45)
ANION GAP SERPL CALCULATED.3IONS-SCNC: 13 MMOL/L (ref 5–18)
AST SERPL W P-5'-P-CCNC: 11 U/L (ref 0–40)
BASOPHILS # BLD AUTO: 0 THOU/UL (ref 0–0.2)
BASOPHILS NFR BLD AUTO: 1 % (ref 0–2)
BILIRUB SERPL-MCNC: 0.3 MG/DL (ref 0–1)
BUN SERPL-MCNC: 17 MG/DL (ref 8–22)
CALCIUM SERPL-MCNC: 9 MG/DL (ref 8.5–10.5)
CHLORIDE BLD-SCNC: 103 MMOL/L (ref 98–107)
CO2 SERPL-SCNC: 23 MMOL/L (ref 22–31)
CREAT SERPL-MCNC: 0.68 MG/DL (ref 0.7–1.3)
EOSINOPHIL # BLD AUTO: 0.4 THOU/UL (ref 0–0.4)
EOSINOPHIL NFR BLD AUTO: 5 % (ref 0–6)
ERYTHROCYTE [DISTWIDTH] IN BLOOD BY AUTOMATED COUNT: 14.7 % (ref 11–14.5)
GFR SERPL CREATININE-BSD FRML MDRD: >60 ML/MIN/1.73M2
GLUCOSE BLD-MCNC: 123 MG/DL (ref 70–125)
HBA1C MFR BLD: 6.5 %
HCT VFR BLD AUTO: 30.9 % (ref 40–54)
HGB BLD-MCNC: 9.9 G/DL (ref 14–18)
LYMPHOCYTES # BLD AUTO: 2.6 THOU/UL (ref 0.8–4.4)
LYMPHOCYTES NFR BLD AUTO: 30 % (ref 20–40)
MCH RBC QN AUTO: 23.4 PG (ref 27–34)
MCHC RBC AUTO-ENTMCNC: 32.2 G/DL (ref 32–36)
MCV RBC AUTO: 73 FL (ref 80–100)
MONOCYTES # BLD AUTO: 0.7 THOU/UL (ref 0–0.9)
MONOCYTES NFR BLD AUTO: 8 % (ref 2–10)
NEUTROPHILS # BLD AUTO: 5 THOU/UL (ref 2–7.7)
NEUTROPHILS NFR BLD AUTO: 57 % (ref 50–70)
PLATELET # BLD AUTO: 425 THOU/UL (ref 140–440)
PMV BLD AUTO: 7 FL (ref 7–10)
POTASSIUM BLD-SCNC: 4 MMOL/L (ref 3.5–5)
PROT SERPL-MCNC: 6.8 G/DL (ref 6–8)
RBC # BLD AUTO: 4.25 MILL/UL (ref 4.4–6.2)
SODIUM SERPL-SCNC: 139 MMOL/L (ref 136–145)
WBC: 8.8 THOU/UL (ref 4–11)

## 2020-12-18 RX ORDER — AMLODIPINE BESYLATE 10 MG/1
10 TABLET ORAL DAILY
Qty: 90 TABLET | Refills: 1 | Status: SHIPPED | OUTPATIENT
Start: 2020-12-18 | End: 2022-03-18

## 2020-12-18 RX ORDER — LISINOPRIL 20 MG/1
20 TABLET ORAL DAILY
Qty: 90 TABLET | Refills: 1 | Status: SHIPPED | OUTPATIENT
Start: 2020-12-18 | End: 2022-04-21

## 2020-12-18 RX ORDER — GLIPIZIDE 10 MG/1
10 TABLET, FILM COATED, EXTENDED RELEASE ORAL DAILY
Qty: 90 TABLET | Refills: 1 | Status: SHIPPED | OUTPATIENT
Start: 2020-12-18 | End: 2022-05-11

## 2020-12-18 ASSESSMENT — MIFFLIN-ST. JEOR: SCORE: 1715.3

## 2020-12-18 NOTE — ASSESSMENT & PLAN NOTE
Improved, now controlled.   a1c the best I have ever seen at 6.5 he tells me he has been exercising on the elliptical machine and has not been going out to eat and has also been trying to eat less food so he can improve his health.  This is all been working well for him and he says he feels the best he has felt in a long time.    Continue metformin 2000 mg po daily  Continue glizipide 10 mg po q day.   Continue trulicity 1.5 mg sq weekly   lisinopril and lipitor  Flu shot done.   Foot exam done 12/18/2020   microalbumin ordered but he did not leave a urine today so will have to do at follow up.      Follow up in 6 months

## 2020-12-18 NOTE — ASSESSMENT & PLAN NOTE
BP Readings from Last 3 Encounters:   12/18/20 124/70   03/07/20 132/76   03/07/20 158/77     Continuenorvasc 10 mg po q day  Continue lisinopril 20 mg po daily

## 2020-12-21 LAB
25(OH)D3 SERPL-MCNC: 52.1 NG/ML (ref 30–80)
25(OH)D3 SERPL-MCNC: 52.1 NG/ML (ref 30–80)

## 2020-12-21 RX ORDER — ATORVASTATIN CALCIUM 80 MG/1
TABLET, FILM COATED ORAL
Qty: 90 TABLET | Refills: 3 | Status: SHIPPED | OUTPATIENT
Start: 2020-12-21 | End: 2021-10-03

## 2020-12-22 ENCOUNTER — COMMUNICATION - HEALTHEAST (OUTPATIENT)
Dept: FAMILY MEDICINE | Facility: CLINIC | Age: 70
End: 2020-12-22

## 2020-12-22 DIAGNOSIS — E11.9 TYPE 2 DIABETES MELLITUS WITHOUT COMPLICATION (H): ICD-10-CM

## 2021-02-02 ENCOUNTER — COMMUNICATION - HEALTHEAST (OUTPATIENT)
Dept: FAMILY MEDICINE | Facility: CLINIC | Age: 71
End: 2021-02-02

## 2021-02-02 DIAGNOSIS — N40.0 BPH (BENIGN PROSTATIC HYPERPLASIA): ICD-10-CM

## 2021-02-02 RX ORDER — TAMSULOSIN HYDROCHLORIDE 0.4 MG/1
CAPSULE ORAL
Qty: 90 CAPSULE | Refills: 3 | Status: SHIPPED | OUTPATIENT
Start: 2021-02-02 | End: 2021-11-11

## 2021-02-05 ENCOUNTER — AMBULATORY - HEALTHEAST (OUTPATIENT)
Dept: NURSING | Facility: CLINIC | Age: 71
End: 2021-02-05

## 2021-02-05 DIAGNOSIS — Z23 NEED FOR VACCINATION: ICD-10-CM

## 2021-02-24 ENCOUNTER — COMMUNICATION - HEALTHEAST (OUTPATIENT)
Dept: FAMILY MEDICINE | Facility: CLINIC | Age: 71
End: 2021-02-24

## 2021-02-24 DIAGNOSIS — E11.9 TYPE 2 DIABETES MELLITUS WITHOUT COMPLICATION, WITHOUT LONG-TERM CURRENT USE OF INSULIN (H): ICD-10-CM

## 2021-03-24 ENCOUNTER — COMMUNICATION - HEALTHEAST (OUTPATIENT)
Dept: FAMILY MEDICINE | Facility: CLINIC | Age: 71
End: 2021-03-24

## 2021-03-24 DIAGNOSIS — E11.9 TYPE 2 DIABETES MELLITUS WITHOUT COMPLICATION (H): ICD-10-CM

## 2021-03-30 ENCOUNTER — COMMUNICATION - HEALTHEAST (OUTPATIENT)
Dept: FAMILY MEDICINE | Facility: CLINIC | Age: 71
End: 2021-03-30

## 2021-03-30 DIAGNOSIS — E11.9 TYPE 2 DIABETES MELLITUS WITHOUT COMPLICATION, WITHOUT LONG-TERM CURRENT USE OF INSULIN (H): ICD-10-CM

## 2021-03-31 RX ORDER — DULAGLUTIDE 1.5 MG/.5ML
1.5 INJECTION, SOLUTION SUBCUTANEOUS
Qty: 30 ML | Refills: 0 | Status: SHIPPED | OUTPATIENT
Start: 2021-03-31 | End: 2022-04-25

## 2021-04-12 ENCOUNTER — COMMUNICATION - HEALTHEAST (OUTPATIENT)
Dept: FAMILY MEDICINE | Facility: CLINIC | Age: 71
End: 2021-04-12

## 2021-04-12 DIAGNOSIS — E11.9 TYPE 2 DIABETES MELLITUS WITHOUT COMPLICATION, WITHOUT LONG-TERM CURRENT USE OF INSULIN (H): ICD-10-CM

## 2021-04-14 ENCOUNTER — COMMUNICATION - HEALTHEAST (OUTPATIENT)
Dept: FAMILY MEDICINE | Facility: CLINIC | Age: 71
End: 2021-04-14

## 2021-04-27 ENCOUNTER — COMMUNICATION - HEALTHEAST (OUTPATIENT)
Dept: FAMILY MEDICINE | Facility: CLINIC | Age: 71
End: 2021-04-27

## 2021-04-27 DIAGNOSIS — E11.9 TYPE 2 DIABETES MELLITUS WITHOUT COMPLICATION, WITHOUT LONG-TERM CURRENT USE OF INSULIN (H): ICD-10-CM

## 2021-04-28 RX ORDER — GLUCOSAMINE HCL/CHONDROITIN SU 500-400 MG
1 CAPSULE ORAL 3 TIMES DAILY
Qty: 300 STRIP | Refills: 3 | Status: SHIPPED | OUTPATIENT
Start: 2021-04-28 | End: 2022-03-14

## 2021-05-26 NOTE — TELEPHONE ENCOUNTER
Refill Approved    Rx renewed per Medication Renewal Policy. Medication was last renewed on 6/12/18.    Jayne Rodgers, [facility] Triage/Med Refill 3/22/2019     Requested Prescriptions   Pending Prescriptions Disp Refills     amLODIPine (NORVASC) 10 MG tablet [Pharmacy Med Name: AMLODIPINE BESYLATE 10MG TABLETS] 90 tablet 0     Sig: TAKE 1 TABLET BY MOUTH DAILY AS DIRECTED    Calcium-Channel Blockers Protocol Passed - 3/20/2019  4:02 PM       Passed - PCP or prescribing provider visit in past 12 months or next 3 months    Last office visit with prescriber/PCP: 11/28/2018 Priti Charles MD OR same dept: Visit date not found OR same specialty: 11/28/2018 Priti Charles MD  Last physical: Visit date not found Last MTM visit: Visit date not found   Next visit within 3 mo: Visit date not found  Next physical within 3 mo: Visit date not found  Prescriber OR PCP: Priti Charles MD  Last diagnosis associated with med order: 1. Essential hypertension  - amLODIPine (NORVASC) 10 MG tablet [Pharmacy Med Name: AMLODIPINE BESYLATE 10MG TABLETS]; TAKE 1 TABLET BY MOUTH DAILY AS DIRECTED  Dispense: 90 tablet; Refill: 0    If protocol passes may refill for 12 months if within 3 months of last provider visit (or a total of 15 months).            Passed - Blood pressure filed in past 12 months    BP Readings from Last 1 Encounters:   11/28/18 124/84

## 2021-05-29 ENCOUNTER — RECORDS - HEALTHEAST (OUTPATIENT)
Dept: ADMINISTRATIVE | Facility: CLINIC | Age: 71
End: 2021-05-29

## 2021-05-30 VITALS — WEIGHT: 242.1 LBS | BODY MASS INDEX: 36.81 KG/M2

## 2021-05-30 VITALS — WEIGHT: 235 LBS | BODY MASS INDEX: 35.73 KG/M2

## 2021-05-30 VITALS — BODY MASS INDEX: 36.04 KG/M2 | WEIGHT: 237 LBS

## 2021-05-30 NOTE — TELEPHONE ENCOUNTER
Refill Approved    Rx renewed per Medication Renewal Policy. Medication was last renewed on 11/19/18.7/22/18    Jayne Rodgers, [facility] Triage/Med Refill 7/25/2019     Requested Prescriptions   Pending Prescriptions Disp Refills     atorvastatin (LIPITOR) 80 MG tablet [Pharmacy Med Name: ATORVASTATIN 80MG TABLETS] 90 tablet 0     Sig: TAKE 1 TABLET(80 MG) BY MOUTH DAILY       Statins Refill Protocol (Hmg CoA Reductase Inhibitors) Passed - 7/24/2019  8:02 PM        Passed - PCP or prescribing provider visit in past 12 months      Last office visit with prescriber/PCP: 11/28/2018 Priti Charles MD OR same dept: 11/28/2018 Priti Charles MD OR same specialty: 11/28/2018 Priti Charles MD  Last physical: Visit date not found Last MTM visit: Visit date not found   Next visit within 3 mo: Visit date not found  Next physical within 3 mo: Visit date not found  Prescriber OR PCP: Priti Charles MD  Last diagnosis associated with med order: 1. Hyperlipidemia  - atorvastatin (LIPITOR) 80 MG tablet [Pharmacy Med Name: ATORVASTATIN 80MG TABLETS]; TAKE 1 TABLET(80 MG) BY MOUTH DAILY  Dispense: 90 tablet; Refill: 0    2. BPH (benign prostatic hyperplasia)  - tamsulosin (FLOMAX) 0.4 mg cap [Pharmacy Med Name: TAMSULOSIN 0.4MG CAPSULES]; TAKE 1 CAPSULE(0.4 MG) BY MOUTH DAILY AFTER BREAKFAST  Dispense: 90 capsule; Refill: 0    If protocol passes may refill for 12 months if within 3 months of last provider visit (or a total of 15 months).             tamsulosin (FLOMAX) 0.4 mg cap [Pharmacy Med Name: TAMSULOSIN 0.4MG CAPSULES] 90 capsule 0     Sig: TAKE 1 CAPSULE(0.4 MG) BY MOUTH DAILY AFTER BREAKFAST       Alfuzosin/Tamsulosin/Silodosin Refill Protocol  Passed - 7/24/2019  8:02 PM        Passed - PCP or prescribing provider visit in past 12 months       Last office visit with prescriber/PCP: 11/28/2018 Priti Charles MD OR same dept: 11/28/2018 Priti Charles MD OR same specialty: 11/28/2018  Priti Charles MD  Last physical: Visit date not found Last MTM visit: Visit date not found   Next visit within 3 mo: Visit date not found  Next physical within 3 mo: Visit date not found  Prescriber OR PCP: Priti Charles MD  Last diagnosis associated with med order: 1. Hyperlipidemia  - atorvastatin (LIPITOR) 80 MG tablet [Pharmacy Med Name: ATORVASTATIN 80MG TABLETS]; TAKE 1 TABLET(80 MG) BY MOUTH DAILY  Dispense: 90 tablet; Refill: 0    2. BPH (benign prostatic hyperplasia)  - tamsulosin (FLOMAX) 0.4 mg cap [Pharmacy Med Name: TAMSULOSIN 0.4MG CAPSULES]; TAKE 1 CAPSULE(0.4 MG) BY MOUTH DAILY AFTER BREAKFAST  Dispense: 90 capsule; Refill: 0    If protocol passes may refill for 12 months if within 3 months of last provider visit (or a total of 15 months).

## 2021-05-31 ENCOUNTER — RECORDS - HEALTHEAST (OUTPATIENT)
Dept: ADMINISTRATIVE | Facility: CLINIC | Age: 71
End: 2021-05-31

## 2021-05-31 VITALS — HEIGHT: 68 IN | WEIGHT: 241 LBS | BODY MASS INDEX: 36.53 KG/M2

## 2021-05-31 VITALS — BODY MASS INDEX: 35.92 KG/M2 | HEIGHT: 68 IN | WEIGHT: 237 LBS

## 2021-06-01 VITALS — WEIGHT: 233 LBS | HEIGHT: 68 IN | BODY MASS INDEX: 35.31 KG/M2

## 2021-06-01 NOTE — TELEPHONE ENCOUNTER
Refill Approved    Rx renewed per Medication Renewal Policy. Medication was last renewed on 3/22/19.10/1/18    Jayne Rodgers, [facility] Triage/Med Refill 9/24/2019     Requested Prescriptions   Pending Prescriptions Disp Refills     lisinopril (PRINIVIL,ZESTRIL) 20 MG tablet [Pharmacy Med Name: LISINOPRIL 20MG TABLETS] 90 tablet 0     Sig: TAKE 1 TABLET(20 MG) BY MOUTH DAILY       Ace Inhibitors Refill Protocol Passed - 9/23/2019  3:34 PM        Passed - PCP or prescribing provider visit in past 12 months       Last office visit with prescriber/PCP: 11/28/2018 Priti Charles MD OR same dept: 11/28/2018 Priti Charles MD OR same specialty: 11/28/2018 Priti Charles MD  Last physical: Visit date not found Last MTM visit: Visit date not found   Next visit within 3 mo: Visit date not found  Next physical within 3 mo: Visit date not found  Prescriber OR PCP: Priti Charles MD  Last diagnosis associated with med order: 1. Hypertension  - lisinopril (PRINIVIL,ZESTRIL) 20 MG tablet [Pharmacy Med Name: LISINOPRIL 20MG TABLETS]; TAKE 1 TABLET(20 MG) BY MOUTH DAILY  Dispense: 90 tablet; Refill: 0  - amLODIPine (NORVASC) 10 MG tablet [Pharmacy Med Name: AMLODIPINE BESYLATE 10MG TABLETS]; TAKE 1 TABLET BY MOUTH DAILY AS DIRECTED  Dispense: 90 tablet; Refill: 0    2. Essential hypertension  - lisinopril (PRINIVIL,ZESTRIL) 20 MG tablet [Pharmacy Med Name: LISINOPRIL 20MG TABLETS]; TAKE 1 TABLET(20 MG) BY MOUTH DAILY  Dispense: 90 tablet; Refill: 0  - amLODIPine (NORVASC) 10 MG tablet [Pharmacy Med Name: AMLODIPINE BESYLATE 10MG TABLETS]; TAKE 1 TABLET BY MOUTH DAILY AS DIRECTED  Dispense: 90 tablet; Refill: 0    If protocol passes may refill for 12 months if within 3 months of last provider visit (or a total of 15 months).             Passed - Serum Potassium in past 12 months     Lab Results   Component Value Date    Potassium 4.6 11/28/2018             Passed - Blood pressure filed in past 12 months      BP Readings from Last 1 Encounters:   11/28/18 124/84             Passed - Serum Creatinine in past 12 months     Creatinine   Date Value Ref Range Status   11/28/2018 0.73 0.70 - 1.30 mg/dL Final             amLODIPine (NORVASC) 10 MG tablet [Pharmacy Med Name: AMLODIPINE BESYLATE 10MG TABLETS] 90 tablet 0     Sig: TAKE 1 TABLET BY MOUTH DAILY AS DIRECTED       Calcium-Channel Blockers Protocol Passed - 9/23/2019  3:34 PM        Passed - PCP or prescribing provider visit in past 12 months or next 3 months     Last office visit with prescriber/PCP: 11/28/2018 Priti Charles MD OR same dept: 11/28/2018 Priti Charles MD OR same specialty: 11/28/2018 Priti Charles MD  Last physical: Visit date not found Last MTM visit: Visit date not found   Next visit within 3 mo: Visit date not found  Next physical within 3 mo: Visit date not found  Prescriber OR PCP: Priti Charles MD  Last diagnosis associated with med order: 1. Hypertension  - lisinopril (PRINIVIL,ZESTRIL) 20 MG tablet [Pharmacy Med Name: LISINOPRIL 20MG TABLETS]; TAKE 1 TABLET(20 MG) BY MOUTH DAILY  Dispense: 90 tablet; Refill: 0  - amLODIPine (NORVASC) 10 MG tablet [Pharmacy Med Name: AMLODIPINE BESYLATE 10MG TABLETS]; TAKE 1 TABLET BY MOUTH DAILY AS DIRECTED  Dispense: 90 tablet; Refill: 0    2. Essential hypertension  - lisinopril (PRINIVIL,ZESTRIL) 20 MG tablet [Pharmacy Med Name: LISINOPRIL 20MG TABLETS]; TAKE 1 TABLET(20 MG) BY MOUTH DAILY  Dispense: 90 tablet; Refill: 0  - amLODIPine (NORVASC) 10 MG tablet [Pharmacy Med Name: AMLODIPINE BESYLATE 10MG TABLETS]; TAKE 1 TABLET BY MOUTH DAILY AS DIRECTED  Dispense: 90 tablet; Refill: 0    If protocol passes may refill for 12 months if within 3 months of last provider visit (or a total of 15 months).             Passed - Blood pressure filed in past 12 months     BP Readings from Last 1 Encounters:   11/28/18 124/84

## 2021-06-02 ENCOUNTER — RECORDS - HEALTHEAST (OUTPATIENT)
Dept: ADMINISTRATIVE | Facility: CLINIC | Age: 71
End: 2021-06-02

## 2021-06-02 VITALS — HEIGHT: 68 IN | WEIGHT: 236 LBS | BODY MASS INDEX: 35.77 KG/M2

## 2021-06-02 NOTE — TELEPHONE ENCOUNTER
Refill Approved    Rx renewed per Medication Renewal Policy. Medication was last renewed on 7/25/19.    Galilea Padilla, [facility] Triage/Med Refill 10/24/2019     Requested Prescriptions   Pending Prescriptions Disp Refills     tamsulosin (FLOMAX) 0.4 mg cap [Pharmacy Med Name: TAMSULOSIN 0.4MG CAPSULES] 90 capsule 0     Sig: TAKE 1 CAPSULE(0.4 MG) BY MOUTH DAILY AFTER BREAKFAST       Alfuzosin/Tamsulosin/Silodosin Refill Protocol  Passed - 10/24/2019  3:47 PM        Passed - PCP or prescribing provider visit in past 12 months       Last office visit with prescriber/PCP: 11/28/2018 Priti Charles MD OR same dept: 11/28/2018 Priti Charles MD OR same specialty: 11/28/2018 Priti Charles MD  Last physical: Visit date not found Last MTM visit: Visit date not found   Next visit within 3 mo: Visit date not found  Next physical within 3 mo: Visit date not found  Prescriber OR PCP: Priti Charles MD  Last diagnosis associated with med order: 1. BPH (benign prostatic hyperplasia)  - tamsulosin (FLOMAX) 0.4 mg cap [Pharmacy Med Name: TAMSULOSIN 0.4MG CAPSULES]; TAKE 1 CAPSULE(0.4 MG) BY MOUTH DAILY AFTER BREAKFAST  Dispense: 90 capsule; Refill: 0    If protocol passes may refill for 12 months if within 3 months of last provider visit (or a total of 15 months).

## 2021-06-02 NOTE — TELEPHONE ENCOUNTER
Patient came in with his wife today, and I told him he is due for a diabetes appt.   Pooja Jaquez CMA 11/1/2019 2:51 PM   Thao DAMON

## 2021-06-03 NOTE — TELEPHONE ENCOUNTER
----- Message from Cesar Ochoa MD sent at 11/29/2019  7:00 AM CST -----  Support staff to please contact patient by phone and relay the following:  -Your folate level is normal.   -Your iron and ferritin levels are low, as expected.   -Please follow up with Dr. Charles as scheduled later today.

## 2021-06-03 NOTE — PROGRESS NOTES
ASSESSMENT AND PLAN:     Problem List Items Addressed This Visit        Unprioritized    Microcytic anemia - Primary     NEW DX IRON DEFICIENCY ANEMIA  Unclear what has caused drop of hgb from 15 last year down to 7/8 now.  His hgb has been stable over the past days, and he declines hospital visit for transfusion although I did recommend that as he is 'feeling tired all the time'.     Iron supplement given  Hematology consult to discuss possible iron infusion. I did call hematology today, but no provider was able to talk to me, so I did leave a message to ask about iron infusion.  Colonoscopy and egd to rule out gi bleed - he did have negative occult stool test yesterday.    Increase benefiber as iron supplement will likely cause constipation.         Relevant Medications    ferrous sulfate 325 (65 FE) MG tablet    Other Relevant Orders    HM1(CBC and Differential) (Completed)    Morphology,Smear Review (MORP)    HM1 (CBC with Diff) (Completed)    Ambulatory referral to Oncology/Hematology Adult    Ambulatory Referral for Upper GI Endoscopy    Ambulatory referral for Colonoscopy           Chief Complaint   Patient presents with     Anemia     follow up        HPI  Regis JONES Edwardo is a 69 y.o. male comes in to follow up on acute low hgb that is new - probably chronic, but just noted on routine blood work done for diabetes follow up.    He says he feels 'fine' but just tired all the time.    Social History     Tobacco Use   Smoking Status Former Smoker   Smokeless Tobacco Never Used      Current Outpatient Medications on File Prior to Visit   Medication Sig Dispense Refill     amLODIPine (NORVASC) 10 MG tablet TAKE 1 TABLET BY MOUTH DAILY AS DIRECTED 90 tablet 0     ASCORBATE CALCIUM (VITAMIN C ORAL) Take by mouth.       aspirin 81 MG EC tablet Take 81 mg by mouth daily.       atorvastatin (LIPITOR) 80 MG tablet Take 1 tablet (80 mg total) by mouth daily. 90 tablet 0     blood glucose test strips Use 1 each As Directed  as needed. Dispense AccuChek Guide to match patient's meter 100 strip 12     CALCIUM ORAL Take by mouth.       cholecalciferol, vitamin D3, 400 unit cap Take by mouth.       CRANBERRY EXTRACT (CRANBERRY ORAL) Take by mouth.       dulaglutide (TRULICITY) 1.5 mg/0.5 mL PnIj Inject 1.5 mg under the skin every 7 days. 6 Syringe 12     glipiZIDE (GLUCOTROL XL) 10 MG 24 hr tablet TAKE 1 TABLET(10 MG) BY MOUTH DAILY 90 tablet 3     lisinopril (PRINIVIL,ZESTRIL) 20 MG tablet TAKE 1 TABLET(20 MG) BY MOUTH DAILY 90 tablet 0     metFORMIN (GLUCOPHAGE) 500 MG tablet TAKE 2 TABLETS(1000 MG) BY MOUTH TWICE DAILY WITH MEALS 360 tablet 3     MULTIVITAMIN ORAL Take by mouth.       OMEGA-3/DHA/EPA/FISH OIL (FISH OIL-OMEGA-3 FATTY ACIDS) 300-1,000 mg capsule Take 2 g by mouth daily.       tamsulosin (FLOMAX) 0.4 mg cap TAKE 1 CAPSULE(0.4 MG) BY MOUTH DAILY AFTER BREAKFAST 90 capsule 0     No current facility-administered medications on file prior to visit.       No Known Allergies    Review of Systems   Constitutional: Positive for fatigue.   HENT: Negative.  Negative for nosebleeds.    Eyes: Negative.    Respiratory: Positive for shortness of breath. Negative for cough, chest tightness and wheezing.    Cardiovascular: Negative.  Negative for chest pain and palpitations.   Gastrointestinal: Negative.  Negative for anal bleeding, blood in stool, nausea and vomiting.   Endocrine: Negative.    Genitourinary: Negative.  Negative for hematuria.   Musculoskeletal: Negative.    Skin: Negative.    Neurological: Negative.  Negative for syncope, weakness and light-headedness.   Hematological: Negative.  Does not bruise/bleed easily.   Psychiatric/Behavioral: Negative.         OBJECTIVE: /70   Pulse 92   Resp 20    Physical Exam  Constitutional:       General: He is not in acute distress.     Appearance: Normal appearance. He is well-developed and normal weight.   HENT:      Head: Normocephalic and atraumatic.   Eyes:       Conjunctiva/sclera: Conjunctivae normal.   Neck:      Musculoskeletal: Neck supple.   Cardiovascular:      Rate and Rhythm: Normal rate and regular rhythm.      Heart sounds: Normal heart sounds.   Pulmonary:      Effort: Pulmonary effort is normal.      Breath sounds: Normal breath sounds.   Abdominal:      General: Bowel sounds are normal.      Palpations: Abdomen is soft.   Musculoskeletal: Normal range of motion.   Skin:     General: Skin is warm and dry.   Neurological:      Mental Status: He is alert and oriented to person, place, and time.          Additional History from Old Records Summarized (2): yes  Decision to Obtain Records (1): yes from walk in [facility]  Radiology Tests Summarized or Ordered (1): no  Labs Reviewed or Ordered (1): yes  Medicine Test Summarized or Ordered (1): yes  Independent Review of EKG or X-RAY(2 each): no    This note was created using Dragon dictation.  Please excuse any grammatical errors.

## 2021-06-03 NOTE — PROGRESS NOTES
Unexpected anemia and need for further eval and possible hospitalization as well as cognitive decline and diabetes follow up done in addition to annual wellness exam today as the patient does not follow up as instructed and this was all needed.  Assessment and Plan:   I spent 40 minutes today in direct patient contact, 100% of the time in consultation concerning medical problems as listed below.     Problem List Items Addressed This Visit        Unprioritized    Hyperlipidemia - Primary     Recheck today. Follow up in 3 months. Planning lifestyle and diet improvements, has been eating poorly lately.          Relevant Medications    atorvastatin (LIPITOR) 80 MG tablet    Other Relevant Orders    Lipid [place] (Completed)    Hypertension     Controlled.   BP Readings from Last 3 Encounters:   11/27/19 124/70   11/28/18 124/84   07/17/18 122/80     Continue norvasc 10 mg po q day  Continue lisinopril 20 mg po daily         Relevant Orders    Comprehensive Metabolic Panel (Completed)    Nephrolithiasis Of The Left Kidney    Type 2 diabetes mellitus without complication (H)     a1c up from 8.4 to 7.1  Continue metformin 2000 mg po daily  Continue glizipide 10 mg po q day.   Continue trulicity 1.5 mg sq weekly  Discussed switching from trulicity to victoza    He does not want to swich to victoza, he is accompanied by his wife. They says they have been eating very poorly and have room to improve, plan to do that and recheck in 3 months.     Continue lisinopril and lipitor  Flu shot done.   Foot exam done 11/27/19  microalbumin pending  Lipid pending  cmp pending  b12 to monitor metfomrmin pending    Follow up in 3 months          Relevant Orders    Ambulatory referral to Ophthalmology    Glycosylated Hemoglobin A1c (Completed)    Microalbumin, Random Urine (Completed)    Vitamin D Deficiency    Relevant Orders    Vitamin D, Total (25-Hydroxy)    Cognitive deficits     NEW PROBLEM  Abnormal clock drawing today as part of  AWV  MOCA administered. Scored 21 - very low and abnormal  Discussed neuropsych testing, neurology consult and tsh/ vit b12 testing.   He declined all due to cost except the b12 test (he is on metformin)    On 11/28 the day after he saw me, I noted that his hgb was unexpectedly 8 (previously noted to be 15 for past 9 years)    Unclear if maybe his cognitive deficits could have been due to occult anemia.   Plan to follow up on his cognitive decline in 3 months as he does not like to come to the doctor more frequently         Relevant Orders    Vitamin B12 (Completed)    Microcytic anemia     NEW AND UNEXPECTED PROBLEM DISCOVERED THE DAY AFTER I SAW ERA  See phone note. He is sent to walk in care for recheck hgb and further planning.   He says he is asymptomatic as of phone call 11/23/19 12pm  Regardless he needs colonoscopy (he is due as it has been 10 years.)           Other Visit Diagnoses     Encounter for hepatitis C screening test for low risk patient        Relevant Orders    Hepatitis C Antibody (Anti-HCV)    Screen for colon cancer        Relevant Orders    Ambulatory referral for Colonoscopy    Screening, anemia, deficiency, iron        Relevant Orders    HM1(CBC and Differential) (Completed)    HM1 (CBC with Diff) (Completed)    Encounter for general adult medical examination with abnormal findings        Flu vaccine need        Relevant Orders    Influenza High Dose,Seasonal,PF 65+ Yrs (Completed)            The patient's current medical problems were reviewed.    I have had an Advance Directives discussion with the patient.  The following health maintenance schedule was reviewed with the patient and provided in printed form in the after visit summary:   Health Maintenance   Topic Date Due     HEPATITIS C SCREENING  1950     DIABETIC EYE EXAM  1950     ZOSTER VACCINES (1 of 2) 06/07/2001     MEDICARE ANNUAL WELLNESS VISIT  06/07/2016     DIABETIC FOOT EXAM  10/25/2018     TD 18+ HE   05/11/2019     A1C  05/28/2019     LIPID  07/17/2019     INFLUENZA VACCINE RULE BASED (1) 08/01/2019     COLONOSCOPY  09/14/2019     BMP  11/28/2019     MICROALBUMIN  11/28/2019     FALL RISK ASSESSMENT  11/28/2019     ADVANCE CARE PLANNING  07/17/2023     PNEUMOCOCCAL IMMUNIZATION 65+ LOW/MEDIUM RISK  Completed        Subjective:   Chief Complaint: Regis Brooke is an 69 y.o. male here for an Annual Wellness visit.   HPI:  Needs diabetes check, no specific complaints today accompanied by his wife    Review of Systems:    Please see above.  The rest of the review of systems are negative for all systems.    Patient Care Team:  Priti Charles MD as PCP - General (Family Medicine)  Priti Charles MD as Assigned PCP     Patient Active Problem List   Diagnosis     Lower Back Pain     Hyperlipidemia     Hypertension     Nephrolithiasis Of The Left Kidney     Type 2 diabetes mellitus without complication (H)     Clear Cell Adenocarcinoma Of The Kidney     Vitamin D Deficiency     Preventative health care     Wears hearing aid - bilateral     Pain in both hands     No past medical history on file.   Past Surgical History:   Procedure Laterality Date     LA REMOVAL GALLBLADDER      Description: Cholecystectomy;  Recorded: 05/05/2008;     LA REMV KIDNEY,W/RIB RESECTION      Description: Nephrectomy;  Recorded: 09/12/2011;  Comments: RIGHT PARTIAL NEPHRECTOMY, 11/2010     LA REPAIR UMBILICAL RITU,<6Y/O,REDUC      Description: Umbilical Hernia Repair;  Recorded: 05/05/2008;      No family history on file.   Social History     Socioeconomic History     Marital status:      Spouse name: Not on file     Number of children: Not on file     Years of education: Not on file     Highest education level: Not on file   Occupational History     Not on file   Social Needs     Financial resource strain: Not on file     Food insecurity:     Worry: Not on file     Inability: Not on file     Transportation needs:     Medical:  Not on file     Non-medical: Not on file   Tobacco Use     Smoking status: Passive Smoke Exposure - Never Smoker     Smokeless tobacco: Never Used   Substance and Sexual Activity     Alcohol use: Not on file     Drug use: Not on file     Sexual activity: Not on file   Lifestyle     Physical activity:     Days per week: Not on file     Minutes per session: Not on file     Stress: Not on file   Relationships     Social connections:     Talks on phone: Not on file     Gets together: Not on file     Attends Worship service: Not on file     Active member of club or organization: Not on file     Attends meetings of clubs or organizations: Not on file     Relationship status: Not on file     Intimate partner violence:     Fear of current or ex partner: Not on file     Emotionally abused: Not on file     Physically abused: Not on file     Forced sexual activity: Not on file   Other Topics Concern     Not on file   Social History Narrative     Not on file      Current Outpatient Medications   Medication Sig Dispense Refill     amLODIPine (NORVASC) 10 MG tablet TAKE 1 TABLET BY MOUTH DAILY AS DIRECTED 90 tablet 0     ASCORBATE CALCIUM (VITAMIN C ORAL) Take by mouth.       aspirin 81 MG EC tablet Take 81 mg by mouth daily.       atorvastatin (LIPITOR) 80 MG tablet TAKE 1 TABLET(80 MG) BY MOUTH DAILY 90 tablet 0     blood glucose test strips Use 1 each As Directed as needed. Dispense AccuChek Guide to match patient's meter 100 strip 12     CALCIUM ORAL Take by mouth.       cholecalciferol, vitamin D3, 400 unit cap Take by mouth.       CRANBERRY EXTRACT (CRANBERRY ORAL) Take by mouth.       dulaglutide (TRULICITY) 1.5 mg/0.5 mL PnIj Inject 1.5 mg under the skin every 7 days. 6 Syringe 12     glipiZIDE (GLUCOTROL XL) 10 MG 24 hr tablet TAKE 1 TABLET(10 MG) BY MOUTH DAILY 90 tablet 3     lisinopril (PRINIVIL,ZESTRIL) 20 MG tablet TAKE 1 TABLET(20 MG) BY MOUTH DAILY 90 tablet 0     metFORMIN (GLUCOPHAGE) 500 MG tablet TAKE 2  "TABLETS(1000 MG) BY MOUTH TWICE DAILY WITH MEALS 360 tablet 3     MULTIVITAMIN ORAL Take by mouth.       OMEGA-3/DHA/EPA/FISH OIL (FISH OIL-OMEGA-3 FATTY ACIDS) 300-1,000 mg capsule Take 2 g by mouth daily.       tamsulosin (FLOMAX) 0.4 mg cap TAKE 1 CAPSULE(0.4 MG) BY MOUTH DAILY AFTER BREAKFAST 90 capsule 0     No current facility-administered medications for this visit.       Objective:   Vital Signs:   Visit Vitals  /70   Pulse 88   Resp 20   Ht 5' 8\" (1.727 m)   Wt (!) 240 lb (108.9 kg)   BMI 36.49 kg/m         VisionScreening:  No exam data present     PHYSICAL EXAM  Physical Exam  Constitutional:       General: He is not in acute distress.     Appearance: He is well-developed.   HENT:      Head: Normocephalic and atraumatic.      Right Ear: Tympanic membrane, ear canal and external ear normal.      Left Ear: Tympanic membrane, ear canal and external ear normal.      Mouth/Throat:      Mouth: Mucous membranes are moist.      Pharynx: Oropharynx is clear.   Eyes:      Conjunctiva/sclera: Conjunctivae normal.   Neck:      Musculoskeletal: Neck supple.   Cardiovascular:      Rate and Rhythm: Normal rate and regular rhythm.      Heart sounds: Normal heart sounds.   Pulmonary:      Effort: Pulmonary effort is normal.      Breath sounds: Normal breath sounds.   Abdominal:      General: Bowel sounds are normal.      Palpations: Abdomen is soft.   Musculoskeletal: Normal range of motion.   Skin:     General: Skin is warm and dry.   Neurological:      Mental Status: He is alert and oriented to person, place, and time.   Psychiatric:      Comments: Abnormal clock drawing and abnormal low score on MOCA cognitive assessment today.           Assessment Results 11/27/2019   Activities of Daily Living No help needed   Instrumental Activities of Daily Living No help needed   Mini Cog Total Score 4   Some recent data might be hidden     A Mini-Cog score of 0-2 suggests the possibility of dementia, score of 3-5 suggests " no dementia    Identified Health Risks:     He is at risk for lack of exercise and has been provided with information to increase physical activity for the benefit of his well-being.  The patient was counseled and encouraged to consider modifying their diet and eating habits. He was provided with information on recommended healthy diet options.  The patient was provided with written information regarding signs of hearing loss.  Information regarding advance directives (living [place]), including where he can download the appropriate form, was provided to the patient via the AVS.

## 2021-06-03 NOTE — PROGRESS NOTES
[facility] Note                                                        Date of Visit: 11/28/2019     Chief Complaint   Regis Brooke is a(n) 69 y.o. White or  male who presents to [facility], accompanied by his wife, with the following complaint(s):  blood work (was told to come in today because of low hemglobin)       Assessment and Plan   1. Microcytic anemia  - Iron and Transferrin Iron Binding Capacity  - Ferritin  - Folate, Serum  - Occult Blood(ICT); Future  - Occult Blood(ICT)    2. Abnormal laboratory test  - HM1(CBC and Differential)  - HM1 (CBC with Diff)    3. Lightheadedness  - Nursing communication      Patient with type 2 diabetes, hypertension, hyperlipidemia, and history of clear cell adenocarcinoma of the kidney, status post partial nephrectomy, presenting for recheck of low hemoglobin. He had an annual wellness visit with Primary Care yesterday, at which time routine laboratory studies were completed. His hemoglobin unexpectedly returned low at 8.1 (compared to previous level of 15.4 on 7/17/2018). Patient endorses mild symptoms of anemia for the past several weeks but denies acute worsening of these symptoms. He also denies signs / symptoms of GI bleeding and cardiac ischemia. Patient is hemodynamically stable with negative orthostatic blood pressure measurements. Hemoglobin is 7.7 this evening, but it is unclear if this is actually a further acute drop in hemoglobin or within the expected range of variability due to factors such as hydration status, draw technique, and difference in laboratory equipment. Hemoccult testing is negative, which is reassuring that patient is not actively bleeding from his GI tract. Folate level, iron panel, and ferritin level have been ordered for further workup. Vitamin B12 level was normal yesterday. Undertook an extensive discussion with the patient and his wife regarding how best to proceed with evaluation and treatment of his anemia at this time.  "Anemia appears to have developed slowly given his duration of symptoms, hemodynamic stability, and negative Hemoccult. Transfusion threshold does not appear to be met for those same reasons and the fact that the patient is not known to have coronary artery or cerebrovascular disease. Discussed the option of proceeding to the ED for immediate transfusion and hospitalization for serial hemoglobin monitoring and urgent endoscopy versus follow up in clinic tomorrow to repeat hemoglobin, make arrangements for further evaluation (referral for urgent EGD and colonoscopy), and discuss treatment recommendations. Through shared medical decision making, the patient and his wife opted for outpatient follow up, which has been scheduled with Dr. Charles tomorrow afternoon. Discussed signs / symptoms of worsening anemia, cardiac ischemia, cerebrovascular ischemia, and active GI bleed that require emergent medical attention in the meantime. Instructed patient to hold his aspirin until he is seen for follow up tomorrow.     Counseled patient and his wife regarding assessment and plan for evaluation and treatment. Questions were answered. See AVS for the specific written instructions and educational handout(s) regarding anemia that were provided at the conclusion of the visit.     Follow up has been scheduled with Dr. Charles tomorrow.      History of Present Illness   Patient presents for recheck of hemoglobin. He had an annual wellness visit with Primary Care yesterday, at which time routine laboratory studies were completed. His hemoglobin unexpectedly returned low at 8.1 (compared to previous level of 15.4 on 7/17/2018). He was contacted by his Primary Care Provider by phone today and instructed to come in for a recheck. He reports that he has been feeling tired and \"out of energy\" for a couple of weeks. He finds that he is dozing frequently for the past few weeks. He has felt briefly lightheaded when he returns to a standing " position after bending forward to pick something up. He has not experienced syncope. Denies headaches, vision changes, facial droop, and focal weakness. Denies chest pain, palpitations, shortness of breath, and lower extremity edema. Reports that he was able to mulch leaves with his push mower earlier this week and shovel snow yesterday without difficulty. Reports heartburn after eating or laying down prone in bed, typically 3-4 times per week. Denies nausea, vomiting, abdominal pain, and diarrhea. Has noted constipation for several months. Is using Benefiber for the past couple of weeks with some improvement. Denies melena and hematochezia. Denies epistaxis, hematuria, bruising, and petechiae. Takes aspirin 325 mg once daily. Takes ibuprofen 600 mg approximately once a week for musculoskeletal pain. Does not take naproxen. Smokes an occasional cigar. Has not smoked cigarettes for 30-40 years; wife smokes and exposes him to secondhand smoke. Has never used chewing tobacco. Consumes one beer or mixed drink per week. Denies history of anemia, peptic ulcer disease, and GI bleed. Has type 2 diabetes, hypertension, hyperlipidemia, and history of clear cell adenocarcinoma of the kidney status post partial nephrectomy. Had a colonoscopy in 2009; report is not available in Epic but patient and his wife report that 10 year follow up was recommended.      Review of Systems   Review of Systems   All other systems reviewed and are negative.       Physical Exam   Vitals:    11/28/19 1520 11/28/19 1552 11/28/19 1555 11/28/19 1557   BP: 121/70 132/74 138/80 122/75   Patient Site: Right Arm Left Arm     Patient Position: Sitting Lying Sitting Standing   Cuff Size: Adult Large      Pulse: (!) 102 98 (!) 107 (!) 109   Resp: 16      Temp: 98.5  F (36.9  C)      TempSrc: Oral      SpO2: 99% 98% 98% 97%   Weight: (!) 243 lb 6.4 oz (110.4 kg)        Physical Exam  Vitals signs and nursing note reviewed.   Constitutional:       General:  He is not in acute distress.     Appearance: He is well-developed. He is obese. He is not ill-appearing, toxic-appearing or diaphoretic.   HENT:      Mouth/Throat:      Mouth: Mucous membranes are moist. No oral lesions.      Pharynx: Oropharynx is clear.   Eyes:      General: Lids are normal. No scleral icterus.     Comments: Conjunctiva pale.    Neck:      Musculoskeletal: Neck supple. No edema or erythema.   Cardiovascular:      Rate and Rhythm: Normal rate and regular rhythm.      Heart sounds: S1 normal and S2 normal. No murmur. No friction rub. No gallop.    Pulmonary:      Effort: Pulmonary effort is normal.      Breath sounds: Normal breath sounds. No stridor. No wheezing, rhonchi or rales.   Abdominal:      General: Abdomen is protuberant. Bowel sounds are normal.      Palpations: Abdomen is soft.      Tenderness: There is no abdominal tenderness. There is no right CVA tenderness or left CVA tenderness.   Musculoskeletal:      Right lower leg: No edema.      Left lower leg: No edema.   Lymphadenopathy:      Cervical: No cervical adenopathy.   Skin:     General: Skin is warm and dry.      Capillary Refill: Capillary refill takes less than 2 seconds.      Coloration: Skin is pale. Skin is not jaundiced.      Findings: No bruising, petechiae or rash.   Neurological:      General: No focal deficit present.      Mental Status: He is alert and oriented to person, place, and time.      Cranial Nerves: Cranial nerves are intact.      Sensory: Sensation is intact.      Motor: Motor function is intact.      Coordination: Coordination is intact.          Diagnostic Studies   Laboratory:  Results for orders placed or performed in visit on 11/28/19   HM1 (CBC with Diff)   Result Value Ref Range    WBC 7.1 4.0 - 11.0 thou/uL    RBC 3.40 (L) 4.40 - 6.20 mill/uL    Hemoglobin 7.7 (LL) 14.0 - 18.0 g/dL    Hematocrit 24.0 (L) 40.0 - 54.0 %    MCV 71 (L) 80 - 100 fL    MCH 22.6 (L) 27.0 - 34.0 pg    MCHC 32.1 32.0 - 36.0 g/dL     RDW 13.5 11.0 - 14.5 %    Platelets 296 140 - 440 thou/uL    MPV 7.2 7.0 - 10.0 fL    Neutrophils % 69 50 - 70 %    Lymphocytes % 20 20 - 40 %    Monocytes % 8 2 - 10 %    Eosinophils % 2 0 - 6 %    Basophils % 0 0 - 2 %    Neutrophils Absolute 4.9 2.0 - 7.7 thou/uL    Lymphocytes Absolute 1.4 0.8 - 4.4 thou/uL    Monocytes Absolute 0.6 0.0 - 0.9 thou/uL    Eosinophils Absolute 0.2 0.0 - 0.4 thou/uL    Basophils Absolute 0.0 0.0 - 0.2 thou/uL   Occult Blood(ICT)   Result Value Ref Range    Fecal Occult Bld (ICT) 1 Negative Negative     Radiology:  N/A  Electrocardiogram:  N/A     Procedure Note   N/A     Pertinent History   The following portions of the patient's history were reviewed and updated as appropriate: allergies, current medications, past family history, past medical history, past social history, past surgical history and problem list.    Patient has Lower Back Pain; Hyperlipidemia; Hypertension; Nephrolithiasis Of The Left Kidney; Type 2 diabetes mellitus without complication (H); Clear Cell Adenocarcinoma Of The Kidney; Vitamin D Deficiency; Preventative health care; Wears hearing aid - bilateral; Pain in both hands; Cognitive deficits; and Microcytic anemia on their problem list.    Patient has a past medical history of Clear Cell Adenocarcinoma Of The Kidney, Hyperlipidemia, Hypertension, Nephrolithiasis Of The Left Kidney, Type 2 diabetes mellitus without complication (H), and Vitamin D deficiency.    Patient has a past surgical history that includes pr removal gallbladder; pr repair umbilical hawa,<6y/o,reduc; and pr remv kidney,w/rib resection.    Patient's family history is not on file.    Patient reports that he is a non-smoker but has been exposed to tobacco smoke. He has never used smokeless tobacco.     Portions of this note have been dictated using voice recognition software. Any grammatical or context distortions are unintentional and inherent to the software.     Cesar Ochoa,  MD  [facility]

## 2021-06-04 ENCOUNTER — OFFICE VISIT - HEALTHEAST (OUTPATIENT)
Dept: FAMILY MEDICINE | Facility: CLINIC | Age: 71
End: 2021-06-04

## 2021-06-04 VITALS
SYSTOLIC BLOOD PRESSURE: 124 MMHG | HEART RATE: 88 BPM | HEIGHT: 68 IN | BODY MASS INDEX: 36.37 KG/M2 | WEIGHT: 240 LBS | RESPIRATION RATE: 20 BRPM | DIASTOLIC BLOOD PRESSURE: 70 MMHG

## 2021-06-04 VITALS
RESPIRATION RATE: 16 BRPM | TEMPERATURE: 98.5 F | OXYGEN SATURATION: 97 % | WEIGHT: 243.4 LBS | SYSTOLIC BLOOD PRESSURE: 122 MMHG | DIASTOLIC BLOOD PRESSURE: 75 MMHG | HEART RATE: 109 BPM | BODY MASS INDEX: 37.01 KG/M2

## 2021-06-04 VITALS
RESPIRATION RATE: 20 BRPM | OXYGEN SATURATION: 98 % | BODY MASS INDEX: 34.74 KG/M2 | HEART RATE: 92 BPM | WEIGHT: 228.5 LBS | SYSTOLIC BLOOD PRESSURE: 158 MMHG | DIASTOLIC BLOOD PRESSURE: 77 MMHG | TEMPERATURE: 98.5 F

## 2021-06-04 VITALS — SYSTOLIC BLOOD PRESSURE: 122 MMHG | HEART RATE: 92 BPM | RESPIRATION RATE: 20 BRPM | DIASTOLIC BLOOD PRESSURE: 70 MMHG

## 2021-06-04 DIAGNOSIS — E78.5 HYPERLIPIDEMIA, UNSPECIFIED HYPERLIPIDEMIA TYPE: ICD-10-CM

## 2021-06-04 DIAGNOSIS — D50.9 MICROCYTIC ANEMIA: ICD-10-CM

## 2021-06-04 DIAGNOSIS — E11.9 TYPE 2 DIABETES MELLITUS WITHOUT COMPLICATION, WITHOUT LONG-TERM CURRENT USE OF INSULIN (H): ICD-10-CM

## 2021-06-04 DIAGNOSIS — Z00.00 PREVENTATIVE HEALTH CARE: ICD-10-CM

## 2021-06-04 DIAGNOSIS — Z12.11 SCREEN FOR COLON CANCER: ICD-10-CM

## 2021-06-04 DIAGNOSIS — I10 ESSENTIAL HYPERTENSION: ICD-10-CM

## 2021-06-04 DIAGNOSIS — Z12.11 SPECIAL SCREENING FOR MALIGNANT NEOPLASMS, COLON: ICD-10-CM

## 2021-06-04 DIAGNOSIS — Z23 IMMUNIZATION DUE: ICD-10-CM

## 2021-06-04 LAB
BASOPHILS # BLD AUTO: 0.1 THOU/UL (ref 0–0.2)
BASOPHILS NFR BLD AUTO: 1 % (ref 0–2)
CREAT UR-MCNC: 165.7 MG/DL
EOSINOPHIL # BLD AUTO: 0.2 THOU/UL (ref 0–0.4)
EOSINOPHIL NFR BLD AUTO: 3 % (ref 0–6)
ERYTHROCYTE [DISTWIDTH] IN BLOOD BY AUTOMATED COUNT: 16.7 % (ref 11–14.5)
HBA1C MFR BLD: 6 %
HCT VFR BLD AUTO: 25.8 % (ref 40–54)
HGB BLD-MCNC: 7.4 G/DL (ref 14–18)
IMM GRANULOCYTES # BLD: 0 THOU/UL
IMM GRANULOCYTES NFR BLD: 0 %
LYMPHOCYTES # BLD AUTO: 2.2 THOU/UL (ref 0.8–4.4)
LYMPHOCYTES NFR BLD AUTO: 27 % (ref 20–40)
MCH RBC QN AUTO: 21.4 PG (ref 27–34)
MCHC RBC AUTO-ENTMCNC: 28.7 G/DL (ref 32–36)
MCV RBC AUTO: 75 FL (ref 80–100)
MICROALBUMIN UR-MCNC: 1.66 MG/DL (ref 0–1.99)
MICROALBUMIN/CREAT UR: 10 MG/G
MONOCYTES # BLD AUTO: 0.8 THOU/UL (ref 0–0.9)
MONOCYTES NFR BLD AUTO: 10 % (ref 2–10)
NEUTROPHILS # BLD AUTO: 4.8 THOU/UL (ref 2–7.7)
NEUTROPHILS NFR BLD AUTO: 60 % (ref 50–70)
PLATELET # BLD AUTO: 449 THOU/UL (ref 140–440)
PMV BLD AUTO: 9.9 FL (ref 8.5–12.5)
RBC # BLD AUTO: 3.46 MILL/UL (ref 4.4–6.2)
WBC: 8.1 THOU/UL (ref 4–11)

## 2021-06-04 NOTE — ASSESSMENT & PLAN NOTE
BP Readings from Last 3 Encounters:   06/04/21 112/60   12/18/20 124/70   03/07/20 132/76     Continuenorvasc 10 mg po q day  Continue lisinopril 20 mg po daily

## 2021-06-04 NOTE — TELEPHONE ENCOUNTER
Per [facility] they cancelled the order as they were unable to reach them. They called 3x and left voice mails, and mailed a letter to him but have had nor response.

## 2021-06-04 NOTE — TELEPHONE ENCOUNTER
Refill Approved    Rx renewed per Medication Renewal Policy. Medication was last renewed on both last filled 9/24/19    Luz Marina Bain, [facility] Triage/Med Refill 12/24/2019     Requested Prescriptions   Pending Prescriptions Disp Refills     amLODIPine (NORVASC) 10 MG tablet [Pharmacy Med Name: AMLODIPINE BESYLATE 10MG TABLETS] 90 tablet 0     Sig: TAKE 1 TABLET BY MOUTH DAILY AS DIRECTED       Calcium-Channel Blockers Protocol Passed - 12/22/2019  9:56 AM        Passed - PCP or prescribing provider visit in past 12 months or next 3 months     Last office visit with prescriber/PCP: 11/29/2019 Priti Charles MD OR same dept: 11/29/2019 Priti Charles MD OR same specialty: 11/29/2019 Priti Charles MD  Last physical: 11/27/2019 Last MTM visit: Visit date not found   Next visit within 3 mo: Visit date not found  Next physical within 3 mo: Visit date not found  Prescriber OR PCP: Priti Charles MD  Last diagnosis associated with med order: 1. Essential hypertension  - amLODIPine (NORVASC) 10 MG tablet [Pharmacy Med Name: AMLODIPINE BESYLATE 10MG TABLETS]; TAKE 1 TABLET BY MOUTH DAILY AS DIRECTED  Dispense: 90 tablet; Refill: 0  - lisinopril (PRINIVIL,ZESTRIL) 20 MG tablet [Pharmacy Med Name: LISINOPRIL 20MG TABLETS]; TAKE 1 TABLET(20 MG) BY MOUTH DAILY  Dispense: 90 tablet; Refill: 0    2. Hypertension  - amLODIPine (NORVASC) 10 MG tablet [Pharmacy Med Name: AMLODIPINE BESYLATE 10MG TABLETS]; TAKE 1 TABLET BY MOUTH DAILY AS DIRECTED  Dispense: 90 tablet; Refill: 0  - lisinopril (PRINIVIL,ZESTRIL) 20 MG tablet [Pharmacy Med Name: LISINOPRIL 20MG TABLETS]; TAKE 1 TABLET(20 MG) BY MOUTH DAILY  Dispense: 90 tablet; Refill: 0    If protocol passes may refill for 12 months if within 3 months of last provider visit (or a total of 15 months).             Passed - Blood pressure filed in past 12 months     BP Readings from Last 1 Encounters:   11/29/19 122/70             lisinopril  (PRINIVIL,ZESTRIL) 20 MG tablet [Pharmacy Med Name: LISINOPRIL 20MG TABLETS] 90 tablet 0     Sig: TAKE 1 TABLET(20 MG) BY MOUTH DAILY       Ace Inhibitors Refill Protocol Passed - 12/22/2019  9:56 AM        Passed - PCP or prescribing provider visit in past 12 months       Last office visit with prescriber/PCP: 11/29/2019 Priti Charles MD OR same dept: 11/29/2019 Priti Charles MD OR same specialty: 11/29/2019 Priti Charles MD  Last physical: 11/27/2019 Last MTM visit: Visit date not found   Next visit within 3 mo: Visit date not found  Next physical within 3 mo: Visit date not found  Prescriber OR PCP: Priti Charles MD  Last diagnosis associated with med order: 1. Essential hypertension  - amLODIPine (NORVASC) 10 MG tablet [Pharmacy Med Name: AMLODIPINE BESYLATE 10MG TABLETS]; TAKE 1 TABLET BY MOUTH DAILY AS DIRECTED  Dispense: 90 tablet; Refill: 0  - lisinopril (PRINIVIL,ZESTRIL) 20 MG tablet [Pharmacy Med Name: LISINOPRIL 20MG TABLETS]; TAKE 1 TABLET(20 MG) BY MOUTH DAILY  Dispense: 90 tablet; Refill: 0    2. Hypertension  - amLODIPine (NORVASC) 10 MG tablet [Pharmacy Med Name: AMLODIPINE BESYLATE 10MG TABLETS]; TAKE 1 TABLET BY MOUTH DAILY AS DIRECTED  Dispense: 90 tablet; Refill: 0  - lisinopril (PRINIVIL,ZESTRIL) 20 MG tablet [Pharmacy Med Name: LISINOPRIL 20MG TABLETS]; TAKE 1 TABLET(20 MG) BY MOUTH DAILY  Dispense: 90 tablet; Refill: 0    If protocol passes may refill for 12 months if within 3 months of last provider visit (or a total of 15 months).             Passed - Serum Potassium in past 12 months     Lab Results   Component Value Date    Potassium 4.0 11/27/2019             Passed - Blood pressure filed in past 12 months     BP Readings from Last 1 Encounters:   11/29/19 122/70             Passed - Serum Creatinine in past 12 months     Creatinine   Date Value Ref Range Status   11/27/2019 0.79 0.70 - 1.30 mg/dL Final

## 2021-06-04 NOTE — ASSESSMENT & PLAN NOTE
Well controlled  a1c the best I have ever seen at 6.0 with improved diet and elliptical workouts.     Continue metformin 2000 mg po daily  Continue glizipide 10 mg po q day.   Continue trulicity 1.5 mg sq weekly     Continue lisinopril and lipitor  Flu shot done fall 2020.   Foot exam done 12/18/2020   microalbumin ordered but he did not leave a urine today so will have to do at follow up.   Eye exam at [facility] eye 12/23/2020     Follow up in 6 months

## 2021-06-04 NOTE — TELEPHONE ENCOUNTER
Refill Approved    Rx renewed per Medication Renewal Policy. Medication was last renewed on 12/23/18.    Jayne Rodgers, [facility] Triage/Med Refill 12/24/2019     Requested Prescriptions   Pending Prescriptions Disp Refills     glipiZIDE (GLUCOTROL XL) 10 MG 24 hr tablet [Pharmacy Med Name: GLIPIZIDE ER 10MG TABLETS] 30 tablet 0     Sig: TAKE 1 TABLET(10 MG) BY MOUTH DAILY       Oral Hypoglycemics Refill Protocol Passed - 12/20/2019  6:16 PM        Passed - Visit with PCP or prescribing provider visit in last 6 months       Last office visit with prescriber/PCP: 11/29/2019 OR same dept: 11/29/2019 Priti Charles MD OR same specialty: 11/29/2019 Priti Charles MD Last physical: 11/27/2019 Last MTM visit: Visit date not found         Next appt within 3 mo: Visit date not found  Next physical within 3 mo: Visit date not found  Prescriber OR PCP: Priti Charles MD  Last diagnosis associated with med order: 1. Type 2 diabetes mellitus without complication, without long-term current use of insulin (H)  - glipiZIDE (GLUCOTROL XL) 10 MG 24 hr tablet [Pharmacy Med Name: GLIPIZIDE ER 10MG TABLETS]; TAKE 1 TABLET(10 MG) BY MOUTH DAILY  Dispense: 30 tablet; Refill: 0    2. Type 2 diabetes mellitus without complication (H)  - metFORMIN (GLUCOPHAGE) 500 MG tablet [Pharmacy Med Name: METFORMIN 500MG TABLETS]; TAKE 2 TABLETS(1000 MG) BY MOUTH TWICE DAILY WITH MEALS  Dispense: 120 tablet; Refill: 0     If protocol passes may refill for 12 months if within 3 months of last provider visit (or a total of 15 months).           Passed - A1C in last 6 months     Hemoglobin A1c   Date Value Ref Range Status   11/27/2019 8.4 (H) 3.5 - 6.0 % Final               Passed - Microalbumin in last year      Microalbumin, Random Urine   Date Value Ref Range Status   11/27/2019 1.44 0.00 - 1.99 mg/dL Final                  Passed - Blood pressure in last year     BP Readings from Last 1 Encounters:   11/29/19 122/70              Passed - Serum creatinine in last year     Creatinine   Date Value Ref Range Status   11/27/2019 0.79 0.70 - 1.30 mg/dL Final             metFORMIN (GLUCOPHAGE) 500 MG tablet [Pharmacy Med Name: METFORMIN 500MG TABLETS] 120 tablet 0     Sig: TAKE 2 TABLETS(1000 MG) BY MOUTH TWICE DAILY WITH MEALS       Metformin Refill Protocol Passed - 12/20/2019  6:16 PM        Passed - Blood pressure in last 12 months     BP Readings from Last 1 Encounters:   11/29/19 122/70             Passed - LFT or AST or ALT in last 12 months     Albumin   Date Value Ref Range Status   11/27/2019 3.7 3.5 - 5.0 g/dL Final     Bilirubin, Total   Date Value Ref Range Status   11/27/2019 0.4 0.0 - 1.0 mg/dL Final     Bilirubin, Direct   Date Value Ref Range Status   09/17/2010 0.30 <0.31 mg/dL Final     Alkaline Phosphatase   Date Value Ref Range Status   11/27/2019 83 45 - 120 U/L Final     AST   Date Value Ref Range Status   11/27/2019 15 0 - 40 U/L Final     ALT   Date Value Ref Range Status   11/27/2019 16 0 - 45 U/L Final     Protein, Total   Date Value Ref Range Status   11/27/2019 6.8 6.0 - 8.0 g/dL Final                Passed - GFR or Serum Creatinine in last 6 months     GFR MDRD Non Af Amer   Date Value Ref Range Status   11/27/2019 >60 >60 mL/min/1.73m2 Final     GFR MDRD Af Amer   Date Value Ref Range Status   11/27/2019 >60 >60 mL/min/1.73m2 Final             Passed - Visit with PCP or prescribing provider visit in last 6 months or next 3 months     Last office visit with prescriber/PCP: 11/29/2019 OR same dept: 11/29/2019 Priti Charles MD OR same specialty: 11/29/2019 Priti Charles MD Last physical: 11/27/2019 Last MTM visit: Visit date not found         Next appt within 3 mo: Visit date not found  Next physical within 3 mo: Visit date not found  Prescriber OR PCP: Priti Charles MD  Last diagnosis associated with med order: 1. Type 2 diabetes mellitus without complication, without long-term current use of insulin  (H)  - glipiZIDE (GLUCOTROL XL) 10 MG 24 hr tablet [Pharmacy Med Name: GLIPIZIDE ER 10MG TABLETS]; TAKE 1 TABLET(10 MG) BY MOUTH DAILY  Dispense: 30 tablet; Refill: 0    2. Type 2 diabetes mellitus without complication (H)  - metFORMIN (GLUCOPHAGE) 500 MG tablet [Pharmacy Med Name: METFORMIN 500MG TABLETS]; TAKE 2 TABLETS(1000 MG) BY MOUTH TWICE DAILY WITH MEALS  Dispense: 120 tablet; Refill: 0     If protocol passes may refill for 12 months if within 3 months of last provider visit (or a total of 15 months).           Passed - A1C in last 6 months     Hemoglobin A1c   Date Value Ref Range Status   11/27/2019 8.4 (H) 3.5 - 6.0 % Final               Passed - Microalbumin in last year      Microalbumin, Random Urine   Date Value Ref Range Status   11/27/2019 1.44 0.00 - 1.99 mg/dL Final

## 2021-06-05 VITALS
DIASTOLIC BLOOD PRESSURE: 70 MMHG | BODY MASS INDEX: 32.74 KG/M2 | HEART RATE: 76 BPM | SYSTOLIC BLOOD PRESSURE: 124 MMHG | HEIGHT: 68 IN | WEIGHT: 216 LBS | RESPIRATION RATE: 14 BRPM

## 2021-06-05 NOTE — TELEPHONE ENCOUNTER
Refill Approved    Rx renewed per Medication Renewal Policy. Medication was last renewed on 10/24/19.    Awilda Titus, [facility] Triage/Med Refill 1/26/2020     Requested Prescriptions   Pending Prescriptions Disp Refills     tamsulosin (FLOMAX) 0.4 mg cap [Pharmacy Med Name: TAMSULOSIN 0.4MG CAPSULES] 90 capsule 0     Sig: TAKE 1 CAPSULE(0.4 MG) BY MOUTH DAILY AFTER BREAKFAST       Alfuzosin/Tamsulosin/Silodosin Refill Protocol  Passed - 1/25/2020  5:46 PM        Passed - PCP or prescribing provider visit in past 12 months       Last office visit with prescriber/PCP: 11/29/2019 Priti Charles MD OR same dept: 11/29/2019 Priti Charles MD OR same specialty: 11/29/2019 Priti Charles MD  Last physical: 11/27/2019 Last MTM visit: Visit date not found   Next visit within 3 mo: Visit date not found  Next physical within 3 mo: Visit date not found  Prescriber OR PCP: Priti Charles MD  Last diagnosis associated with med order: 1. BPH (benign prostatic hyperplasia)  - tamsulosin (FLOMAX) 0.4 mg cap [Pharmacy Med Name: TAMSULOSIN 0.4MG CAPSULES]; TAKE 1 CAPSULE(0.4 MG) BY MOUTH DAILY AFTER BREAKFAST  Dispense: 90 capsule; Refill: 0    If protocol passes may refill for 12 months if within 3 months of last provider visit (or a total of 15 months).

## 2021-06-06 NOTE — TELEPHONE ENCOUNTER
Refill Approved    Rx renewed per Medication Renewal Policy. Medication was last renewed on 11/27/19.    Jayne Rodgers, [facility] Triage/Med Refill 2/25/2020     Requested Prescriptions   Pending Prescriptions Disp Refills     atorvastatin (LIPITOR) 80 MG tablet [Pharmacy Med Name: ATORVASTATIN 80MG TABLETS] 90 tablet 0     Sig: TAKE 1 TABLET(80 MG) BY MOUTH DAILY       Statins Refill Protocol (Hmg CoA Reductase Inhibitors) Passed - 2/25/2020  4:24 AM        Passed - PCP or prescribing provider visit in past 12 months      Last office visit with prescriber/PCP: 11/29/2019 Priti Charles MD OR same dept: 11/29/2019 Priti Charles MD OR same specialty: 11/29/2019 Priti Charles MD  Last physical: 11/27/2019 Last MTM visit: Visit date not found   Next visit within 3 mo: Visit date not found  Next physical within 3 mo: Visit date not found  Prescriber OR PCP: Priti Charles MD  Last diagnosis associated with med order: 1. Hyperlipidemia  - atorvastatin (LIPITOR) 80 MG tablet [Pharmacy Med Name: ATORVASTATIN 80MG TABLETS]; TAKE 1 TABLET(80 MG) BY MOUTH DAILY  Dispense: 90 tablet; Refill: 0    If protocol passes may refill for 12 months if within 3 months of last provider visit (or a total of 15 months).

## 2021-06-06 NOTE — TELEPHONE ENCOUNTER
Message left for patient to call clinic to set up an appointment for kidney stone follow-up for today or tomorrow.  Dolores Hernandez RN

## 2021-06-06 NOTE — TELEPHONE ENCOUNTER
Refill Approved    Rx renewed per Medication Renewal Policy. Medication was last renewed on 1/2/19.    Jayne Rodgers, [facility] Triage/Med Refill 2/25/2020     Requested Prescriptions   Pending Prescriptions Disp Refills     TRULICITY 1.5 mg/0.5 mL PnIj [Pharmacy Med Name: TRULICITY 1.5 MG/0.5 ML PEN] 6 Syringe 11     Sig: INJECT 1.5 MG UNDER THE SKIN EVERY 7 DAYS       Insulin/GLP-1 Refill Protocol Passed - 2/25/2020  1:47 AM        Passed - Visit with PCP or prescribing provider visit in last 6 months     Last office visit with prescriber/PCP: 11/29/2019 OR same dept: 11/29/2019 Priti Charles MD OR same specialty: 11/29/2019 Priti Charles MD Last physical: 11/27/2019 Last MTM visit: Visit date not found     Next appt within 3 mo: Visit date not found  Next physical within 3 mo: Visit date not found  Prescriber OR PCP: Priti Charles MD  Last diagnosis associated with med order: There are no diagnoses linked to this encounter.  If protocol passes may refill for 6 months if within 3 months of last provider visit (or a total of 9 months).              Passed - A1C in last 6 months     Hemoglobin A1c   Date Value Ref Range Status   11/27/2019 8.4 (H) 3.5 - 6.0 % Final               Passed - Microalbumin in last year     Microalbumin, Random Urine   Date Value Ref Range Status   11/27/2019 1.44 0.00 - 1.99 mg/dL Final                  Passed - Blood pressure in last year     BP Readings from Last 1 Encounters:   11/29/19 122/70             Passed - Creatinine done in last year     Creatinine   Date Value Ref Range Status   11/27/2019 0.79 0.70 - 1.30 mg/dL Final

## 2021-06-06 NOTE — PROGRESS NOTES
Subjective:      Patient ID: Regis Brooke is a 69 y.o. male.    Chief Complaint:    HPI  Patient here with abdominal pain.  Onset yesterday at 5 PM after eating.  Is quite intense.  He thought might be his ventral hernia.  He notes that the pain was there all night.  Seem to change when onto more left-sided and left CVA.  No nausea vomiting take liquids but has no appetite.  Hard to get comfortable.  Voiding and stooling okay.}    Past Medical History:   Diagnosis Date     Clear Cell Adenocarcinoma Of The Kidney     Created by Conversion [facility] Annotation: May 29 2011  4:34PM - Negar Lawler: RIGHT KIDNEY S/P  PARTIAL NEPHRECTOMY, 11-2-2011  Replacement Utility updated for latest IMO load     Hyperlipidemia     Created by Conversion      Hypertension     Lisinopril and norvasc.  Replacement Utility updated for latest IMO load     Nephrolithiasis Of The Left Kidney     Created by Conversion      Type 2 diabetes mellitus without complication (H)     Created by Conversion      Vitamin D deficiency     Created by Conversion  Replacement Utility updated for latest IMO load       Past Surgical History:   Procedure Laterality Date     MO REMOVAL GALLBLADDER      Description: Cholecystectomy;  Recorded: 05/05/2008;     MO REMV KIDNEY,W/RIB RESECTION      Description: Nephrectomy;  Recorded: 09/12/2011;  Comments: RIGHT PARTIAL NEPHRECTOMY, 11/2010     MO REPAIR UMBILICAL RITU,<6Y/O,REDUC      Description: Umbilical Hernia Repair;  Recorded: 05/05/2008;       No family history on file.    Social History     Tobacco Use     Smoking status: Former Smoker     Smokeless tobacco: Never Used   Substance Use Topics     Alcohol use: Not on file     Drug use: Not on file       Review of Systems   All other systems reviewed and are negative.      Objective:     /77 (Patient Site: Right Arm, Patient Position: Sitting, Cuff Size: Adult Large)   Pulse 92   Temp 98.5  F (36.9  C) (Oral)   Resp 20   Wt (!) 228 lb 8 oz  (103.6 kg)   SpO2 98%   BMI 34.74 kg/m      Physical Exam  Vitals signs and nursing note reviewed.   Constitutional:       Appearance: Normal appearance.   Neck:      Musculoskeletal: Normal range of motion and neck supple.   Cardiovascular:      Rate and Rhythm: Normal rate and regular rhythm.   Pulmonary:      Effort: Pulmonary effort is normal.      Breath sounds: Normal breath sounds.   Abdominal:      Comments: Abdomen reveals the ventral hernia that is soft and nontender.  He is got some diffuse abdominal tenderness with no guarding or rebound most prevalent left upper quadrant.  He also has some left CVA tenderness.   Neurological:      General: No focal deficit present.      Mental Status: He is alert and oriented to person, place, and time.         Assessment:   With abdominal pain.  Given the amount of discomfort he is and we will send him to [facility]'s emergency room we spoke with provider there  Procedures    There were no encounter diagnoses.    Plan:     As above

## 2021-06-07 ENCOUNTER — COMMUNICATION - HEALTHEAST (OUTPATIENT)
Dept: FAMILY MEDICINE | Facility: CLINIC | Age: 71
End: 2021-06-07

## 2021-06-09 NOTE — PROGRESS NOTES
[facility] Clinic Note  Patient Name: Regis Brooke  Patient Age: 66 y.o.  YOB: 1950  MRN: [id number]  ?  Date of Visit: 3/21/2017  Reason for Office Visit:   Chief Complaint   Patient presents with     Blood Sugar Problem       HPI: Regis Brooke 66 y.o. who presents to clinic for diabetes visit    H/o T2DM. He is taking metformin 2000 mg daily and glipizide 10 mg daily     His last A1c on 3/20/17 was 10.9, an increase from 8.2 in May '16.     He checks his blood sugars     His diet is ok. Quit a lot of the soda, bread.  And has gained about 7 Ibs since May.    He does not exercise or smoke     He saw diabetic educator yesterday    Feels rundown. No tingling/numbness in ext. 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     BG mete checking 1-2x/day  FB, 246, 272, 274, 203, 204, 252, 254, 179, 210, 256, 233, 253, 258, 248, 200, 269, 276  Before lunch or in the evenin, 220, 250, 287, 249, 185, 208, 244    No hypoglycemic episodes     Review of Systems: As noted in HPI     Current Scheduled Meds:  Outpatient Encounter Prescriptions as of 3/21/2017   Medication Sig Dispense Refill     amLODIPine (NORVASC) 10 MG tablet TAKE 1 TABLET BY MOUTH EVERY DAY AS DIRECTED 90 tablet 0     ASCORBATE CALCIUM (VITAMIN C ORAL) Take by mouth.       aspirin 81 MG EC tablet Take 81 mg by mouth daily.       atorvastatin (LIPITOR) 80 MG tablet TAKE 1 TABLET BY MOUTH DAILY 90 tablet 2     CALCIUM ORAL Take by mouth.       cholecalciferol, vitamin D3, 400 unit cap Take by mouth.       CRANBERRY EXTRACT (CRANBERRY ORAL) Take by mouth.       glipiZIDE (GLUCOTROL) 10 MG 24 hr tablet Take 1 tablet (10 mg total) by mouth daily. 90 tablet prn     lisinopril (PRINIVIL,ZESTRIL) 5 MG tablet TAKE 1 TABLET(5 MG) BY MOUTH DAILY 90 tablet 2     metFORMIN (GLUCOPHAGE XR) 500 MG 24 hr tablet Take 4 tablets (2,000 mg total) by mouth daily with  breakfast. 360 tablet 3     MULTIVITAMIN ORAL Take by mouth.       OMEGA-3/DHA/EPA/FISH OIL (FISH OIL-OMEGA-3 FATTY ACIDS) 300-1,000 mg capsule Take 2 g by mouth daily.       insulin glargine (LANTUS SOLOSTAR) 100 unit/mL (3 mL) pen Inject 10 Units under the skin daily. Do not mix Lantus with any other insulin 5 adj dose pen 1     No facility-administered encounter medications on file as of 3/21/2017.        Objective / Physical Examination:  Visit Vitals     /70     Pulse 72     Wt (!) 237 lb (107.5 kg)     BMI 36.04 kg/m2     Wt Readings from Last 3 Encounters:   03/21/17 (!) 237 lb (107.5 kg)   03/20/17 (!) 242 lb 1.6 oz (109.8 kg)   05/04/16 (!) 235 lb (106.6 kg)     Body mass index is 36.04 kg/(m^2). (>25?)    General Appearance: Alert and oriented in no acute distress  Lungs: Clear to auscultation bilaterally. Normal inspiratory and expiratory effort.   Cardiovascular: RRR S1, S2. No m/r/g  Abdomen: Bowel sounds active all four quadrants. Soft, non-tender.  Integumentary: Warm and dry.   Neuro: Alert and oriented, follows commands appropriately. Gait normal     Assessment / Plan / Medical Decision Making:      Encounter Diagnoses   Name Primary?     Type 2 diabetes mellitus without complication, without long-term current use of insulin Yes        1. Type 2 diabetes mellitus without complication, without long-term current use of insulin    Stressed importance of diet and encouraged 30 min of aerobic exercise per day/ handouts provided on healthy diet   Patients wife has been on Lantus for years and he is very familiar and comfortable starting an insulin regimen   Will start on 10 units daily and increase based on response. Continue metformin and glipizide daily   Check micro albumin today     - insulin glargine (LANTUS SOLOSTAR) 100 unit/mL (3 mL) pen; Inject 10 Units under the skin daily. Do not mix Lantus with any other insulin  Dispense: 5 adj dose pen; Refill: 1  - Microalbumin, Random Urine    Return  for Recheck DM in 2-3 weeks. earlier if needed.    Stuart Sabillon MD  [facility]

## 2021-06-09 NOTE — PROGRESS NOTES
"Assessment: pt seen today for DM education. He has not been seen by anyone since 2016. Pt states his BG have been increasing and he is worried. He also notes he feels like \"crap.\" Always tired and drained. He notes his wife is diabetic and she started insulin not too long ago and now her BG are good and she feels a lot better. He wonders if \"it's time I start insulin.\" He brings in BG meter and is checking 1-2x/day.   FB, 246, 272, 274, 203, 204, 252, 254, 179, 210, 256, 233, 253, 258, 248, 200, 269, 276  Before lunch or in the evenin, 220, 250, 287, 249, 185, 208, 244    Activity: not much this winter and not much since jail. Discussed ways to increase today and discussed the benefits.     Meals:   Breakfast: same every day, 2 pieces of toast with coffee and artificial sweetener  Lunch: a can of vegetables  Dinner: could be anything  Pt seems to have a good understanding of CHO vs non CHO foods. He has stopped diet soda, rarely eats white bread and has cut back on pasta. Pt notes his BG just remain high.     He needs to be seen by PCP. Pt is not sure if he would like to continue going to [facility] [place] [facility].     Lab Results   Component Value Date    HGBA1C 10.9 (H) 2017       I spoke with Leslie Barrera regarding this and the initiation of insulin. She would prefer this to come from Dr. Castañeda as this is where pt was last seen and she has never seen pt. Dr. Castañeda is not in today, so I spoke with Dr. Frost who would really prefer pt to be seen by provider.       Plan: Pt will make appt to see Leslie Barrera NP.     Subjective and Objective:      Regis Brooke is referred by Dr. Frost for Diabetes Education.     Lab Results   Component Value Date    HGBA1C 8.2 (H) 2016       Current diabetes medications:  Metformin 2g/day, glipizide 10mg/day     Follow up:   Primary care visit - next available appointment       Education:     Monitoring   Meter (per above goals): Assessed and " Discussed  Monitoring: Assessed and Discussed  BG goals: Discussed    Nutrition Management  Nutrition Management: Assessed and Discussed  Weight: Assessed and Discussed  Portions/Balance: Assessed and Discussed  Carb ID/Count: Assessed and Discussed  Label Reading: Assessed and Discussed  Heart Healthy Fats: Assessed and Discussed  Menu Planning: Assessed and Discussed  Dining Out: Assessed and Discussed  Physical Activity: Assessed and Discussed  Medications: Discussed  Orals: Discussed  Injected Medications: Discussed   Storage/Exp:Not addressed   Site Rotation: Not addressed   Sites Assessed: no    Diabetes Disease Process: Discussed    Acute Complications: Prevent, Detect, Treat:  Hypoglycemia: Assessed and Discussed  Hyperglycemia: Assessed and Discussed  Sick Days: Discussed  Driving: Not addressed    Chronic Complications  Foot Care:Not addressed  Skin Care: Not addressed  Eye: Not addressed  ABC: Discussed  Teeth:Not addressed  Goal Setting and Problem Solving: Assessed and Discussed  Barriers: Assessed and Discussed  Psychosocial Adjustments: Assessed and Discussed      Time spent with the patient: 60 minutes for diabetes education and counseling.   Previous Education: no  Visit Type:ABDULAZIZ [place]  3/20/2017

## 2021-06-10 NOTE — PROGRESS NOTES
[facility] Clinic Note  Patient Name: Regis Brooke  Patient Age: 66 y.o.  YOB: 1950  MRN: [id number]  ?  Date of Visit: 4/11/2017  Reason for Office Visit:   Chief Complaint   Patient presents with     Follow-up     diabetic check         HPI: Regis Brooke 66 y.o. who presents to clinic for follow up T2DM. He was seen 2 weeks ago and started on lantus 10 units. He has bumped this up to 12 units. Takes insulin at 730-8 am.     He brought a log of his BG    M 180, 230, 192, 143, 223  N 145 106, 164, 154, 204  Night 126, 99     No hypoglycemic events    He is also taking metformin and glipizide.     He feels much better after starting insulin. Energy level improved.     He also has questions regarding BPH. He had biopsy of his prostate last month and was normal. Has been going more often at night. He has weak stream.        Review of Systems: As noted in HPI     Current Scheduled Meds:  Outpatient Encounter Prescriptions as of 4/11/2017   Medication Sig Dispense Refill     amLODIPine (NORVASC) 10 MG tablet TAKE 1 TABLET BY MOUTH EVERY DAY AS DIRECTED 90 tablet 0     ASCORBATE CALCIUM (VITAMIN C ORAL) Take by mouth.       aspirin 81 MG EC tablet Take 81 mg by mouth daily.       atorvastatin (LIPITOR) 80 MG tablet TAKE 1 TABLET BY MOUTH DAILY 90 tablet 2     CALCIUM ORAL Take by mouth.       cholecalciferol, vitamin D3, 400 unit cap Take by mouth.       CRANBERRY EXTRACT (CRANBERRY ORAL) Take by mouth.       glipiZIDE (GLUCOTROL) 10 MG 24 hr tablet Take 1 tablet (10 mg total) by mouth daily. 90 tablet prn     insulin glargine (LANTUS SOLOSTAR) 100 unit/mL (3 mL) pen Inject 14 Units under the skin daily. Do not mix Lantus with any other insulin 5 adj dose pen 1     metFORMIN (GLUCOPHAGE XR) 500 MG 24 hr tablet Take 4 tablets (2,000 mg total) by mouth daily with breakfast. 360 tablet 3     MULTIVITAMIN ORAL Take by mouth.       OMEGA-3/DHA/EPA/FISH OIL (FISH OIL-OMEGA-3 FATTY ACIDS) 300-1,000 mg  capsule Take 2 g by mouth daily.       [DISCONTINUED] insulin glargine (LANTUS SOLOSTAR) 100 unit/mL (3 mL) pen Inject 10 Units under the skin daily. Do not mix Lantus with any other insulin 5 adj dose pen 1     [DISCONTINUED] lisinopril (PRINIVIL,ZESTRIL) 5 MG tablet TAKE 1 TABLET(5 MG) BY MOUTH DAILY 90 tablet 2     lisinopril (PRINIVIL,ZESTRIL) 10 MG tablet Take 1 tablet (10 mg total) by mouth daily. 90 tablet 1     tamsulosin (FLOMAX) 0.4 mg Cp24 Take 1 capsule (0.4 mg total) by mouth Daily after breakfast. 30 capsule 5     No facility-administered encounter medications on file as of 4/11/2017.        Objective / Physical Examination:  /84  Pulse 66  Wt (!) 235 lb (106.6 kg)  BMI 35.73 kg/m2  Wt Readings from Last 3 Encounters:   04/11/17 (!) 235 lb (106.6 kg)   03/21/17 (!) 237 lb (107.5 kg)   03/20/17 (!) 242 lb 1.6 oz (109.8 kg)     Body mass index is 35.73 kg/(m^2). (>25?)    General Appearance: Alert and oriented in no acute distress  Lungs: Clear to auscultation bilaterally  Cardiovascular: RRR  Integumentary: Warm and dry  Neuro: Alert and oriented, follows commands appropriately    Assessment / Plan / Medical Decision Making:      Encounter Diagnoses   Name Primary?     T2DM (type 2 diabetes mellitus) Yes     Essential hypertension      Healthcare maintenance      BPH (benign prostatic hyperplasia)      Type 2 diabetes mellitus without complication, without long-term current use of insulin         1. Essential hypertension  Still slightly above goal. Will increase his ACE to 10 mg daily. Recommend he keep an eye of BP at home twice a week, log values.   Encouraged exercise and weight loss.   Healthy diet, DASH diet   - lisinopril (PRINIVIL,ZESTRIL) 10 MG tablet; Take 1 tablet (10 mg total) by mouth daily.  Dispense: 90 tablet; Refill: 1    2. T2DM (type 2 diabetes mellitus)  Numbers improving since starting insulin. Tolerating Lantus well.   Will increase to 14 units daily   Continue with  metformin and glipizide  Encouraged healthy diet, limit carbs/sugars, exercise more!   - Ambulatory referral to Ophthalmology; Future    3. Healthcare maintenance  Overdue to pneumococcal vaccine   - Pneumococcal conjugate vaccine 13-valent 6wks-17yrs; >50yrs    4. BPH (benign prostatic hyperplasia)  Increased nocturia, weak stream consistent with BPH. Recent biopsy neg  Will start on Flomax daily for symptoms   - tamsulosin (FLOMAX) 0.4 mg Cp24; Take 1 capsule (0.4 mg total) by mouth Daily after breakfast.  Dispense: 30 capsule; Refill: 5    Return for 2 MONTHS FOR A RECHECK , a1C, etc or earlier if needed.      Stuart Sabillon MD  [facility]

## 2021-06-11 ENCOUNTER — AMBULATORY - HEALTHEAST (OUTPATIENT)
Dept: LAB | Facility: CLINIC | Age: 71
End: 2021-06-11

## 2021-06-11 ENCOUNTER — AMBULATORY - HEALTHEAST (OUTPATIENT)
Dept: NURSING | Facility: CLINIC | Age: 71
End: 2021-06-11

## 2021-06-11 DIAGNOSIS — E78.5 HYPERLIPIDEMIA, UNSPECIFIED HYPERLIPIDEMIA TYPE: ICD-10-CM

## 2021-06-11 LAB
CHOLEST SERPL-MCNC: 101 MG/DL
FASTING STATUS PATIENT QL REPORTED: YES
HDLC SERPL-MCNC: 44 MG/DL
LDLC SERPL CALC-MCNC: 49 MG/DL
TRIGL SERPL-MCNC: 40 MG/DL

## 2021-06-11 NOTE — PROGRESS NOTES
ASSESSMENT AND PLAN: We spent 25 minutes today in direct patient contact, 100% of the time in consultation concerning medical problems as listed below.       Problem List Items Addressed This Visit     Hyperlipidemia     Continue lipitor 80 mg po q day.   Recheck lipids today.          Relevant Orders    Lipid [place] (Completed)    Hypertension     Hypertension is unchanged.  Weight loss. strongly recommended.   Blood pressure will be reassessed in 3 months.    cmp and cbc ordered. tsh ordered.   Recommend increase lisinopril from 10mg po q day to 20 mg po q day.  Continue norvasc 10 mg po q day.          Relevant Medications    lisinopril (PRINIVIL,ZESTRIL) 20 MG tablet    Other Relevant Orders    HM1(CBC and Differential) (Completed)    Comprehensive Metabolic Panel (Completed)    Thyroid [place] (Completed)    HM1 (CBC with Diff) (Completed)    Type 2 diabetes mellitus without complication - Primary     Diabetes is improving with treatment.   Diabetes educator referral.  Nutritionist referral.  Diabetes will be reassessed in 3 months.  Morning blood sugars are consistently over 150.    Lab Results   Component Value Date    HGBA1C 7.0 (H) 06/20/2017      Continue metformin 1000 mg po bid  Continue glizipide 24 hr tab 10 mg po qday  Increase lantus (started by Dr. Sabillon in past few months) from 14 units at night to 16 units at night.  Follow up every three months, sooner if problems or concerns.   He states he has trouble remembering to come in every three months so I will ask [facility] to become involved and help him to remember to come in every three months.     Diabetes ed consult ordere  Dietician consult ordered.   Eye exam was just done in the past month.   Due for zoster vax and pneumovax and diabetic foot exam at follow up.         Relevant Orders    Glycosylated Hemoglobin A1c (Completed)    Ambulatory referral to Ophthalmology    Ambulatory referral to Diabetes Education (Existing Diagnosis)     Ambulatory referral to Nutrition Services    Vitamin D Deficiency     Recheck vit D level today, titrate supplement prn.         Relevant Orders    Vitamin D, Total (25-Hydroxy)    Wears hearing aid - bilateral           Chief Complaint   Patient presents with     Diabetes      HPI  Regis Brooke is a 67 y.o. male comes in today for follow-up of his diabetes.  He thinks he is doing better on the lantus 14 IU at night which was started at his last appt.      History   Smoking Status     Passive Smoke Exposure - Never Smoker   Smokeless Tobacco     Never Used      Current Outpatient Prescriptions   Medication Sig Dispense Refill     amLODIPine (NORVASC) 10 MG tablet TAKE 1 TABLET BY MOUTH EVERY DAY AS DIRECTED 90 tablet 0     ASCORBATE CALCIUM (VITAMIN C ORAL) Take by mouth.       aspirin 81 MG EC tablet Take 81 mg by mouth daily.       atorvastatin (LIPITOR) 80 MG tablet TAKE 1 TABLET BY MOUTH DAILY 90 tablet 2     CALCIUM ORAL Take by mouth.       cholecalciferol, vitamin D3, 400 unit cap Take by mouth.       CRANBERRY EXTRACT (CRANBERRY ORAL) Take by mouth.       glipiZIDE (GLUCOTROL) 10 MG 24 hr tablet TAKE 1 TABLET(10 MG) BY MOUTH DAILY 90 tablet 0     insulin glargine (LANTUS SOLOSTAR) 100 unit/mL (3 mL) pen Inject 14 Units under the skin daily. Do not mix Lantus with any other insulin 5 adj dose pen 1     lisinopril (PRINIVIL,ZESTRIL) 10 MG tablet Take 1 tablet (10 mg total) by mouth daily. 90 tablet 1     MULTIVITAMIN ORAL Take by mouth.       OMEGA-3/DHA/EPA/FISH OIL (FISH OIL-OMEGA-3 FATTY ACIDS) 300-1,000 mg capsule Take 2 g by mouth daily.       tamsulosin (FLOMAX) 0.4 mg Cp24 Take 1 capsule (0.4 mg total) by mouth Daily after breakfast. 30 capsule 5     No current facility-administered medications for this visit.      No Known Allergies  Review of Systems   Constitutional: Negative.    HENT: Negative.    Eyes: Negative.    Respiratory: Negative.    Cardiovascular: Negative.    Gastrointestinal: Negative.   "  Endocrine: Negative.    Genitourinary: Negative.    Musculoskeletal: Negative.    Skin: Negative.    Neurological: Negative.    Hematological: Negative.    Psychiatric/Behavioral: Negative.           OBJECTIVE: /80  Pulse 80  Resp 16  Ht 5' 8\" (1.727 m)  Wt (!) 237 lb (107.5 kg)  BMI 36.04 kg/m2  Physical Exam   Constitutional: He is oriented to person, place, and time. He appears well-developed and well-nourished.   HENT:   Head: Normocephalic and atraumatic.   Eyes: Conjunctivae are normal.   Cardiovascular: Normal rate and regular rhythm.    Pulmonary/Chest: Effort normal.   Neurological: He is alert and oriented to person, place, and time.   Skin: Skin is warm and dry.   Psychiatric: He has a normal mood and affect.       Lab Results   Component Value Date    HGBA1C 10.9 (H) 03/20/2017   being updated today.     Lab Results   Component Value Date    LDLCALC 96 05/04/2016   being updated today    BP Readings from Last 3 Encounters:   06/20/17 136/80   04/11/17 144/84   03/21/17 130/70   goal less than 140/90    Statin goal - taking lipitor    History   Smoking Status     Passive Smoke Exposure - Never Smoker   Smokeless Tobacco     Never Used       ASA - he takes this    Lab Results   Component Value Date    MICROALBUR 6.44 (H) 03/21/2017      "

## 2021-06-12 NOTE — PROGRESS NOTES
Attempt 1: Care Guide called patient.  If this patient is returning my call, please transfer to Mary Grace Talavera at ext [phone].

## 2021-06-12 NOTE — TELEPHONE ENCOUNTER
Pt is on 11/13/20 CSS schedule for Shingrix #1.    Please review/approve pended order.    Thank you.

## 2021-06-13 NOTE — TELEPHONE ENCOUNTER
Last office visit 12/1820 20 reviewed.  Intention of continuing current statin.  Refill sent to pharmacy on file.

## 2021-06-13 NOTE — TELEPHONE ENCOUNTER
Refill Approved    Rx renewed per Medication Renewal Policy. Medication was last renewed on 2/25/20.    Luz Marina Bain, [facility] Triage/Med Refill 11/14/2020     Requested Prescriptions   Pending Prescriptions Disp Refills     atorvastatin (LIPITOR) 80 MG tablet [Pharmacy Med Name: ATORVASTATIN 80MG TABLETS] 90 tablet 2     Sig: TAKE 1 TABLET(80 MG) BY MOUTH DAILY       Statins Refill Protocol (Hmg CoA Reductase Inhibitors) Passed - 11/10/2020  3:14 AM        Passed - PCP or prescribing provider visit in past 12 months      Last office visit with prescriber/PCP: 11/29/2019 Priti Charles MD OR same dept: 11/29/2019 Priti Charles MD OR same specialty: 11/29/2019 Priti Charles MD  Last physical: 11/27/2019 Last MTM visit: Visit date not found   Next visit within 3 mo: Visit date not found  Next physical within 3 mo: Visit date not found  Prescriber OR PCP: Priti Charles MD  Last diagnosis associated with med order: 1. Hyperlipidemia  - atorvastatin (LIPITOR) 80 MG tablet [Pharmacy Med Name: ATORVASTATIN 80MG TABLETS]; TAKE 1 TABLET(80 MG) BY MOUTH DAILY  Dispense: 90 tablet; Refill: 2    If protocol passes may refill for 12 months if within 3 months of last provider visit (or a total of 15 months).

## 2021-06-13 NOTE — TELEPHONE ENCOUNTER
RN cannot approve Refill Request    RN can NOT refill this medication PCP messaged that patient is overdue for Office Visit and Protocol failed and NO refill given. Last office visit: 11/29/2019 Priti Charles MD Last Physical: 11/27/2019 Last MTM visit: Visit date not found Last visit same specialty: 11/29/2019 Priti Charles MD.  Next visit within 3 mo: 12/18/2020 Priti Charles MD  Next physical within 3 mo: Visit date not found      Laurie Joiner, [facility] Triage/Med Refill 12/18/2020    Requested Prescriptions   Pending Prescriptions Disp Refills     atorvastatin (LIPITOR) 80 MG tablet [Pharmacy Med Name: ATORVASTATIN 80MG TABLETS] 90 tablet 0     Sig: TAKE 1 TABLET(80 MG) BY MOUTH DAILY       Statins Refill Protocol (Hmg CoA Reductase Inhibitors) Failed - 12/17/2020  3:16 AM        Failed - PCP or prescribing provider visit in past 12 months      Last office visit with prescriber/PCP: 11/29/2019 Priti Charles MD OR same dept: Visit date not found OR same specialty: 11/29/2019 Priti Charles MD  Last physical: 11/27/2019 Last MTM visit: Visit date not found   Next visit within 3 mo: 12/18/2020 Priti Charles MD  Next physical within 3 mo: Visit date not found  Prescriber OR PCP: Priti Charles MD  Last diagnosis associated with med order: 1. Hyperlipidemia  - atorvastatin (LIPITOR) 80 MG tablet [Pharmacy Med Name: ATORVASTATIN 80MG TABLETS]; TAKE 1 TABLET(80 MG) BY MOUTH DAILY  Dispense: 90 tablet; Refill: 0    If protocol passes may refill for 12 months if within 3 months of last provider visit (or a total of 15 months).

## 2021-06-13 NOTE — TELEPHONE ENCOUNTER
RN cannot approve Refill Request    RN can NOT refill this medication Protocol failed and NO refill given. Last office visit: 11/29/2019 Priti Charles MD Last Physical: 11/27/2019 Last MTM visit: Visit date not found Last visit same specialty: 11/29/2019 Priti Charles MD.  Next visit within 3 mo: Visit date not found  Next physical within 3 mo: Visit date not found      Jayne Rodgers, [facility] Triage/Med Refill 12/15/2020    Requested Prescriptions   Pending Prescriptions Disp Refills     amLODIPine (NORVASC) 10 MG tablet [Pharmacy Med Name: AMLODIPINE BESYLATE 10MG TABLETS] 90 tablet 3     Sig: TAKE 1 TABLET BY MOUTH DAILY AS DIRECTED       Calcium-Channel Blockers Protocol Failed - 12/15/2020  3:16 AM        Failed - PCP or prescribing provider visit in past 12 months or next 3 months     Last office visit with prescriber/PCP: 11/29/2019 Prtii Charles MD OR same dept: Visit date not found OR same specialty: 11/29/2019 Priti Charles MD  Last physical: 11/27/2019 Last MTM visit: Visit date not found   Next visit within 3 mo: Visit date not found  Next physical within 3 mo: Visit date not found  Prescriber OR PCP: Priti Charles MD  Last diagnosis associated with med order: 1. Essential hypertension  - amLODIPine (NORVASC) 10 MG tablet [Pharmacy Med Name: AMLODIPINE BESYLATE 10MG TABLETS]; TAKE 1 TABLET BY MOUTH DAILY AS DIRECTED  Dispense: 90 tablet; Refill: 3  - lisinopriL (PRINIVIL,ZESTRIL) 20 MG tablet [Pharmacy Med Name: LISINOPRIL 20MG TABLETS]; TAKE 1 TABLET(20 MG) BY MOUTH DAILY  Dispense: 90 tablet; Refill: 3    2. Hypertension  - amLODIPine (NORVASC) 10 MG tablet [Pharmacy Med Name: AMLODIPINE BESYLATE 10MG TABLETS]; TAKE 1 TABLET BY MOUTH DAILY AS DIRECTED  Dispense: 90 tablet; Refill: 3  - lisinopriL (PRINIVIL,ZESTRIL) 20 MG tablet [Pharmacy Med Name: LISINOPRIL 20MG TABLETS]; TAKE 1 TABLET(20 MG) BY MOUTH DAILY  Dispense: 90 tablet; Refill: 3    If protocol passes may  refill for 12 months if within 3 months of last provider visit (or a total of 15 months).             Passed - Blood pressure filed in past 12 months     BP Readings from Last 1 Encounters:   03/07/20 132/76                lisinopriL (PRINIVIL,ZESTRIL) 20 MG tablet [Pharmacy Med Name: LISINOPRIL 20MG TABLETS] 90 tablet 3     Sig: TAKE 1 TABLET(20 MG) BY MOUTH DAILY       Ace Inhibitors Refill Protocol Failed - 12/15/2020  3:16 AM        Failed - PCP or prescribing provider visit in past 12 months       Last office visit with prescriber/PCP: 11/29/2019 Priti Charles MD OR same dept: Visit date not found OR same specialty: 11/29/2019 Priti Charles MD  Last physical: 11/27/2019 Last MTM visit: Visit date not found   Next visit within 3 mo: Visit date not found  Next physical within 3 mo: Visit date not found  Prescriber OR PCP: Priti Charles MD  Last diagnosis associated with med order: 1. Essential hypertension  - amLODIPine (NORVASC) 10 MG tablet [Pharmacy Med Name: AMLODIPINE BESYLATE 10MG TABLETS]; TAKE 1 TABLET BY MOUTH DAILY AS DIRECTED  Dispense: 90 tablet; Refill: 3  - lisinopriL (PRINIVIL,ZESTRIL) 20 MG tablet [Pharmacy Med Name: LISINOPRIL 20MG TABLETS]; TAKE 1 TABLET(20 MG) BY MOUTH DAILY  Dispense: 90 tablet; Refill: 3    2. Hypertension  - amLODIPine (NORVASC) 10 MG tablet [Pharmacy Med Name: AMLODIPINE BESYLATE 10MG TABLETS]; TAKE 1 TABLET BY MOUTH DAILY AS DIRECTED  Dispense: 90 tablet; Refill: 3  - lisinopriL (PRINIVIL,ZESTRIL) 20 MG tablet [Pharmacy Med Name: LISINOPRIL 20MG TABLETS]; TAKE 1 TABLET(20 MG) BY MOUTH DAILY  Dispense: 90 tablet; Refill: 3    If protocol passes may refill for 12 months if within 3 months of last provider visit (or a total of 15 months).             Passed - Serum Potassium in past 12 months     Lab Results   Component Value Date    Potassium 4.0 03/07/2020             Passed - Blood pressure filed in past 12 months     BP Readings from Last 1 Encounters:    03/07/20 132/76             Passed - Serum Creatinine in past 12 months     Creatinine   Date Value Ref Range Status   03/07/2020 0.82 0.70 - 1.30 mg/dL Final

## 2021-06-13 NOTE — PROGRESS NOTES
Problem List Items Addressed This Visit     Hypertension     BP Readings from Last 3 Encounters:   10/25/17 128/84   06/20/17 136/80   04/11/17 144/84     continue  Lisinopril 20 mg po q day  And   norvasc 10 mg po q day         Type 2 diabetes mellitus without complication     A1c has improved from 7.0-6.5.  He is currently taking metformin 1000 mrem twice daily, glipizide 10 mg 24-hour tablet daily and Lantus 14-16 units daily based on his blood sugar levels.      He says the Lantus is costly and would like to switch over or decrease the amount of Lantus if possible to save money.    I think we can optimize his metformin by increasing the dose.  Here are the directions for that:  Take metformin as follows to use up current immediate release tablets and then switch to extended release tabs.    Metformin 1500mg orally per day in the morning and 1000 mg orally per day in the evening    After one week if no side effects increase to     Metformin 1500 mg orally in the morning and Metformin 1500 mg orally in the evening.    After one more week if no side effects increase to:      Metformin 2000 mg orally in the morning and Metformin 2000 mg orally in the evening.    Then when you are out of immediate release pills you can switch to     Metformin 500mg xr tabs (extended release) 4 tabs once daily in the mornings. (total 2000 mg xr daily)    Watch your blood sugars carefully and if they are low we will need to adjust your lantus dose so call use if that happens.    I do not think he seen the dietitian this year but we can talk about that at his follow-up visit.    His diabetic foot exam was completed today.  He is to follow-up in 3 months.    He is due for a pneumonia shot April 2018.  He is due for zoster vaccine and should check with his insurance as to where is the best place to get it for to be covered most adequately.    He did see diabetes education in March 2017 and is due for that again in March 2018.  He also had  a diabetic eye exam done in May 2017 and will be due for that again in May 2018.         Relevant Medications    glipiZIDE (GLUCOTROL XL) 10 MG 24 hr tablet    Other Relevant Orders    Glycosylated Hemoglobin A1c (Completed)    Pain in both hands     He thinks is arthritis and so I will refer him to rheumatology.         Relevant Orders    Ambulatory referral to Rheumatology      Other Visit Diagnoses     BPH (benign prostatic hyperplasia)        Relevant Medications    tamsulosin (FLOMAX) 0.4 mg Cp24           Chief Complaint   Patient presents with     Diabetes     Patient is not fasting     Medication Management     HPI  Regis Brooke is a 67 y.o. male comes in for diabetes check and feels really well.     History   Smoking Status     Passive Smoke Exposure - Never Smoker   Smokeless Tobacco     Never Used      Current Outpatient Prescriptions   Medication Sig Dispense Refill     amLODIPine (NORVASC) 10 MG tablet TAKE 1 TABLET BY MOUTH EVERY DAY AS DIRECTED 90 tablet 1     ASCORBATE CALCIUM (VITAMIN C ORAL) Take by mouth.       aspirin 81 MG EC tablet Take 81 mg by mouth daily.       atorvastatin (LIPITOR) 80 MG tablet TAKE 1 TABLET BY MOUTH DAILY 90 tablet 3     CALCIUM ORAL Take by mouth.       cholecalciferol, vitamin D3, 400 unit cap Take by mouth.       CRANBERRY EXTRACT (CRANBERRY ORAL) Take by mouth.       glipiZIDE (GLUCOTROL XL) 10 MG 24 hr tablet Take 1 tablet (10 mg total) by mouth daily. 90 tablet 0     insulin glargine (LANTUS SOLOSTAR) 100 unit/mL (3 mL) pen Inject 14 Units under the skin daily. Do not mix Lantus with any other insulin 2 adj dose pen 0     lisinopril (PRINIVIL,ZESTRIL) 20 MG tablet Take 1 tablet (20 mg total) by mouth daily. 90 tablet 3     metFORMIN (GLUCOPHAGE) 500 MG tablet TAKE 2 TABLETS(1000 MG) BY MOUTH TWICE DAILY WITH MEALS 360 tablet 3     MULTIVITAMIN ORAL Take by mouth.       OMEGA-3/DHA/EPA/FISH OIL (FISH OIL-OMEGA-3 FATTY ACIDS) 300-1,000 mg capsule Take 2 g by mouth  "daily.       tamsulosin (FLOMAX) 0.4 mg Cp24 Take 1 capsule (0.4 mg total) by mouth Daily after breakfast. 90 capsule 2     No current facility-administered medications for this visit.      No Known Allergies  Review of Systems   Constitutional: Negative.    HENT: Negative.    Eyes: Negative.    Respiratory: Negative.    Cardiovascular: Negative.    Gastrointestinal: Negative.    Endocrine: Negative.    Genitourinary: Negative.    Musculoskeletal: Negative.    Skin: Negative.    Neurological: Negative.    Hematological: Negative.    Psychiatric/Behavioral: Negative.       OBJECTIVE: /84  Pulse 88  Resp 16  Ht 5' 8\" (1.727 m)  Wt (!) 241 lb (109.3 kg)  BMI 36.64 kg/m2  Physical Exam   Constitutional: He is oriented to person, place, and time. He appears well-developed and well-nourished. No distress.   HENT:   Head: Normocephalic and atraumatic.   Eyes: Conjunctivae are normal.   Neck: Neck supple.   Cardiovascular: Normal rate and regular rhythm.    Pulmonary/Chest: Effort normal.   Musculoskeletal: Normal range of motion.   Diabetic foot exam normal.   Neurological: He is alert and oriented to person, place, and time.   Skin: Skin is warm and dry.   Psychiatric: He has a normal mood and affect.        Lab Results   Component Value Date    HGBA1C 6.5 (H) 10/25/2017      "

## 2021-06-13 NOTE — PROGRESS NOTES
ASSESSMENT AND PLAN:     Problem List Items Addressed This Visit        Unprioritized    Hyperlipidemia - Primary     Lipids redrawn today.   Continue lipitor 80 mg po q day         Relevant Orders    Lipid [place] FASTING    Hypertension     BP Readings from Last 3 Encounters:   12/18/20 124/70   03/07/20 132/76   03/07/20 158/77     Continue norvasc 10 mg po q day  Continue lisinopril 20 mg po daily         Relevant Medications    lisinopriL (PRINIVIL,ZESTRIL) 20 MG tablet    amLODIPine (NORVASC) 10 MG tablet    Other Relevant Orders    Comprehensive Metabolic Panel    Type 2 diabetes mellitus without complication (H)     Improved, now controlled.   a1c the best I have ever seen at 6.5 he tells me he has been exercising on the [employer] machine and has not been going out to eat and has also been trying to eat less food so he can improve his health.  This is all been working well for him and he says he feels the best he has felt in a long time.    Continue metformin 2000 mg po daily  Continue glizipide 10 mg po q day.   Continue trulicity 1.5 mg sq weekly     Continue lisinopril and lipitor  Flu shot done.   Foot exam done 12/18/2020   microalbumin ordered but he did not leave a urine today so will have to do at follow up.      Follow up in 6 months          Relevant Medications    glipiZIDE (GLUCOTROL XL) 10 MG 24 hr tablet    Other Relevant Orders    Ambulatory referral to Ophthalmology    Glycosylated Hemoglobin A1c (Completed)    Vitamin D Deficiency     11/29/2019 nl d - 48.0  Will recheck.         Relevant Orders    Vitamin D, Total (25-Hydroxy)    Preventative health care     Due for colonoscopy, order placed.          Microcytic anemia     Lab Results   Component Value Date    HGB 9.0 (L) 03/07/2020      Will recheck today.          Relevant Orders    HM1(CBC and Differential)      Other Visit Diagnoses     Screen for colon cancer        Relevant Orders    Ambulatory referral for Colonoscopy            Chief Complaint   Patient presents with     Diabetes     Medication Management        HPI  Regis Brooke is a 70 y.o. male comes in telling me that he is feeling really healthy.  He has been exercising on his rowing machine every day.  He has been eating less food and trying to get more nutritious food into his diet.  He is taking his medications and overall feels things are going very well.    Social History     Tobacco Use   Smoking Status Former Smoker   Smokeless Tobacco Never Used      Current Outpatient Medications on File Prior to Visit   Medication Sig Dispense Refill     ASCORBATE CALCIUM (VITAMIN C ORAL) Take by mouth.       aspirin 81 MG EC tablet Take 81 mg by mouth daily.       atorvastatin (LIPITOR) 80 MG tablet TAKE 1 TABLET(80 MG) BY MOUTH DAILY 90 tablet 0     blood glucose test strips Use 1 each As Directed as needed. Dispense AccuChek Guide to match patient's meter 100 strip 12     CALCIUM ORAL Take by mouth.       cholecalciferol, vitamin D3, 400 unit cap Take by mouth.       CRANBERRY EXTRACT (CRANBERRY ORAL) Take by mouth.       ferrous sulfate 325 (65 FE) MG tablet Take 1 tablet (325 mg total) by mouth daily. 30 tablet 0     metFORMIN (GLUCOPHAGE) 500 MG tablet TAKE 2 TABLETS(1000 MG) BY MOUTH TWICE DAILY WITH MEALS 360 tablet 3     MULTIVITAMIN ORAL Take by mouth.       OMEGA-3/DHA/EPA/FISH OIL (FISH OIL-OMEGA-3 FATTY ACIDS) 300-1,000 mg capsule Take 2 g by mouth daily.       tamsulosin (FLOMAX) 0.4 mg cap TAKE 1 CAPSULE(0.4 MG) BY MOUTH DAILY AFTER BREAKFAST 90 capsule 3     TRULICITY 1.5 mg/0.5 mL PnIj INJECT 1.5 MG UNDER THE SKIN EVERY 7 DAYS 6 Syringe 5     [DISCONTINUED] amLODIPine (NORVASC) 10 MG tablet TAKE 1 TABLET BY MOUTH DAILY AS DIRECTED 90 tablet 3     [DISCONTINUED] glipiZIDE (GLUCOTROL XL) 10 MG 24 hr tablet TAKE 1 TABLET(10 MG) BY MOUTH DAILY 90 tablet 3     [DISCONTINUED] lisinopril (PRINIVIL,ZESTRIL) 20 MG tablet TAKE 1 TABLET(20 MG) BY MOUTH DAILY 90 tablet 3      "[DISCONTINUED] ondansetron (ZOFRAN ODT) 4 MG disintegrating tablet Take 1 tablet (4 mg total) by mouth every 8 (eight) hours as needed for nausea. 12 tablet 0     No current facility-administered medications on file prior to visit.       No Known Allergies    Review of Systems   Constitutional: Negative.    HENT: Negative.    Eyes: Negative.    Respiratory: Negative.    Cardiovascular: Negative.    Gastrointestinal: Negative.    Endocrine: Negative.    Genitourinary: Negative.    Musculoskeletal: Negative.    Skin: Negative.    Neurological: Negative.    Hematological: Negative.    Psychiatric/Behavioral: Negative.         OBJECTIVE: /70   Pulse 76   Resp 14   Ht 5' 7.75\" (1.721 m)   Wt 216 lb (98 kg)   BMI 33.09 kg/m     Physical Exam  Constitutional:       General: He is not in acute distress.     Appearance: He is well-developed.   HENT:      Head: Normocephalic and atraumatic.   Eyes:      Conjunctiva/sclera: Conjunctivae normal.   Neck:      Musculoskeletal: Neck supple.   Cardiovascular:      Rate and Rhythm: Normal rate and regular rhythm.   Pulmonary:      Effort: Pulmonary effort is normal.   Musculoskeletal: Normal range of motion.   Skin:     General: Skin is warm and dry.   Neurological:      Mental Status: He is alert and oriented to person, place, and time.        Diabetic foot exam: normal DP and PT pulses, no trophic changes or ulcerative lesions and normal sensory exam     Additional History from Old Records Summarized (2): yes  Decision to Obtain Records (1): no  Radiology Tests Summarized or Ordered (1): no  Labs Reviewed or Ordered (1): yes  Medicine Test Summarized or Ordered (1): yes  Independent Review of EKG or X-RAY(2 each): no    This note was created using Dragon dictation.  Please excuse any grammatical errors.    "

## 2021-06-14 NOTE — TELEPHONE ENCOUNTER
Refill Approved    Rx renewed per Medication Renewal Policy. Medication was last renewed on 1/26/20.    Jayne Rodgers, [facility] Triage/Med Refill 2/2/2021     Requested Prescriptions   Pending Prescriptions Disp Refills     tamsulosin (FLOMAX) 0.4 mg cap [Pharmacy Med Name: TAMSULOSIN 0.4MG CAPSULES] 90 capsule 3     Sig: TAKE 1 CAPSULE(0.4 MG) BY MOUTH DAILY AFTER BREAKFAST       Alfuzosin/Tamsulosin/Silodosin Refill Protocol  Passed - 2/2/2021  3:16 AM        Passed - PCP or prescribing provider visit in past 12 months       Last office visit with prescriber/PCP: 12/18/2020 Priti Charles MD OR same dept: 12/18/2020 Priti Charles MD OR same specialty: 12/18/2020 Priti Charles MD  Last physical: 11/27/2019 Last MTM visit: Visit date not found   Next visit within 3 mo: Visit date not found  Next physical within 3 mo: Visit date not found  Prescriber OR PCP: Priti Charles MD  Last diagnosis associated with med order: 1. BPH (benign prostatic hyperplasia)  - tamsulosin (FLOMAX) 0.4 mg cap [Pharmacy Med Name: TAMSULOSIN 0.4MG CAPSULES]; TAKE 1 CAPSULE(0.4 MG) BY MOUTH DAILY AFTER BREAKFAST  Dispense: 90 capsule; Refill: 3    If protocol passes may refill for 12 months if within 3 months of last provider visit (or a total of 15 months).

## 2021-06-14 NOTE — PROGRESS NOTES
Attempt 3: Care Guide called patient.  If this patient is returning my call, please transfer to [facility] at ext [phone].  I have called this patient 3 times over the past two weeks and have been unsuccessful in reaching him.  This patient has also not returned any of my messages. I have notified the patient s physician by task. If the provider feels this patient is a good fit in the future I have asked them to put in an order for Ambulatory Referral to Care Management.

## 2021-06-14 NOTE — PROGRESS NOTES
Attempt 2: Care Guide called patient.  If this patient is returning my call, please transfer to [facility] at ext [phone].

## 2021-06-14 NOTE — TELEPHONE ENCOUNTER
Refill Given    Refill given per Policy, patient informed they are overdue for Labs    Bonnie Fletcher, [facility] Triage/Med Refill 12/24/2020    Requested Prescriptions   Pending Prescriptions Disp Refills     metFORMIN (GLUCOPHAGE) 500 MG tablet [Pharmacy Med Name: METFORMIN 500MG TABLETS] 360 tablet 3     Sig: TAKE 2 TABLETS(1000 MG) BY MOUTH TWICE DAILY WITH MEALS       Metformin Refill Protocol Failed - 12/22/2020  3:16 AM        Failed - Microalbumin in last year      Microalbumin, Random Urine   Date Value Ref Range Status   11/27/2019 1.44 0.00 - 1.99 mg/dL Final                  Passed - Blood pressure in last 12 months     BP Readings from Last 1 Encounters:   12/18/20 124/70             Passed - LFT or AST or ALT in last 12 months     Albumin   Date Value Ref Range Status   12/18/2020 3.4 (L) 3.5 - 5.0 g/dL Final     Bilirubin, Total   Date Value Ref Range Status   12/18/2020 0.3 0.0 - 1.0 mg/dL Final     Bilirubin, Direct   Date Value Ref Range Status   03/07/2020 0.2 <=0.5 mg/dL Final     Alkaline Phosphatase   Date Value Ref Range Status   12/18/2020 88 45 - 120 U/L Final     AST   Date Value Ref Range Status   12/18/2020 11 0 - 40 U/L Final     ALT   Date Value Ref Range Status   12/18/2020 9 0 - 45 U/L Final     Protein, Total   Date Value Ref Range Status   12/18/2020 6.8 6.0 - 8.0 g/dL Final                Passed - GFR or Serum Creatinine in last 6 months     GFR MDRD Non Af Amer   Date Value Ref Range Status   12/18/2020 >60 >60 mL/min/1.73m2 Final     GFR MDRD Af Amer   Date Value Ref Range Status   12/18/2020 >60 >60 mL/min/1.73m2 Final             Passed - Visit with PCP or prescribing provider visit in last 6 months or next 3 months     Last office visit with prescriber/PCP: 12/18/2020 OR same dept: 12/18/2020 Priti Charles MD OR same specialty: 12/18/2020 Priti Charles MD Last physical: Visit date not found Last MTM visit: Visit date not found         Next appt within 3 mo:  Visit date not found  Next physical within 3 mo: Visit date not found  Prescriber OR PCP: Priti Charles MD  Last diagnosis associated with med order: 1. Type 2 diabetes mellitus without complication (H)  - metFORMIN (GLUCOPHAGE) 500 MG tablet [Pharmacy Med Name: METFORMIN 500MG TABLETS]; TAKE 2 TABLETS(1000 MG) BY MOUTH TWICE DAILY WITH MEALS  Dispense: 360 tablet; Refill: 3     If protocol passes may refill for 12 months if within 3 months of last provider visit (or a total of 15 months).           Passed - A1C in last 6 months     Hemoglobin A1c   Date Value Ref Range Status   12/18/2020 6.5 (H) <=5.6 % Final

## 2021-06-15 NOTE — PROGRESS NOTES
Problem List Items Addressed This Visit    We spent 35minutes today in direct patient contact, 100% of the time in consultation concerning medical problems as listed below.     Hyperlipidemia     Plan recheck lipids 6/2018   Continue Lipitor 80 mg orally per day         Hypertension     BP Readings from Last 3 Encounters:   02/06/18 126/80   10/25/17 128/84   06/20/17 136/80     Continue lisinopril 20 mg orally per day and continue Norvasc 10 mg orally per day.         Relevant Orders    Basic Metabolic Panel    Type 2 diabetes mellitus without complication - Primary     Lab Results   Component Value Date    HGBA1C 7.6 (H) 02/06/2018     a1c was 6.5 and has increased from October to 7.6 because he had to discontinue Lantus due to cost.    Plan:   He is currently taking metformin 2000 mg once a day.  It appears he is not on extended release dosing so I will change this.    Continue glizipide 10 mg  xr daily    Start victoza - will send to pharmacy consult to help start this and optimize meds/ cost.     Improve diet, more meal planning, less refined foods.        Lab Results   Component Value Date    LDLCALC 65 06/20/2017   make sure ldl is current within the last year.  Goal less than 100 for people without CVD and less than 70 with CVD.     BP Readings from Last 3 Encounters:   02/06/18 126/80   10/25/17 128/84   06/20/17 136/80    goal less than 140/90  Continue lisinopril 20 mg po q day  And continue norvasc 10 mg po q day.    Statin goal - taking lipitor 80 mg po q day.     History   Smoking Status     Passive Smoke Exposure - Never Smoker   Smokeless Tobacco     Never Used        ASA - he takes this.     Lab Results   Component Value Date    MICROALBUR 6.44 (H) 03/21/2017      Plan recheck 5/2018    Eye exam within the last 12 months - done 5/2017    Foot exam - done 10/2017    Ace/ arb for renal protection - yes on lisinopril.     Diabetes ed due march 2018 (last done march 2017)    Dietician consult also  ordered.         Relevant Medications    liraglutide (VICTOZA) 0.6 mg/0.1 mL (18 mg/3 mL) PnIj injection    metFORMIN (GLUCOPHAGE XR) 500 MG 24 hr tablet    Other Relevant Orders    Glycosylated Hemoglobin A1c (Completed)    Ambulatory referral to Medication Management    Ambulatory referral to Diabetes Education (Existing Diagnosis)    Ambulatory referral to Nutrition Services    Preventative health care     Flu shot is given today.  He wants the Zostavax and says his insurance will cover, however we are getting a new/ improved zostervax so he is going to wait until we have that before getting it.           Other Visit Diagnoses     Immunization due        Relevant Orders    Influenza High Dose, Seasonal 65+ yrs (Completed)    Therapeutic drug monitoring               Chief Complaint   Patient presents with     Diabetes     HPI  Regis Brooke is a 67 y.o. male comes in for diabetes check and feels really well. His wife accompanied him today.     Last time he was here he asked to discontinue Lantus due to cost.  We were trying to titrate up his metformin to compensate for this.  It looks like he got up to 2000 mg of metformin once a day but it does not appear to be extended release.    History   Smoking Status     Passive Smoke Exposure - Never Smoker   Smokeless Tobacco     Never Used      Current Outpatient Prescriptions   Medication Sig Dispense Refill     amLODIPine (NORVASC) 10 MG tablet TAKE 1 TABLET BY MOUTH EVERY DAY AS DIRECTED 90 tablet 1     ASCORBATE CALCIUM (VITAMIN C ORAL) Take by mouth.       aspirin 81 MG EC tablet Take 81 mg by mouth daily.       atorvastatin (LIPITOR) 80 MG tablet TAKE 1 TABLET BY MOUTH DAILY 90 tablet 3     CALCIUM ORAL Take by mouth.       cholecalciferol, vitamin D3, 400 unit cap Take by mouth.       CRANBERRY EXTRACT (CRANBERRY ORAL) Take by mouth.       glipiZIDE (GLUCOTROL XL) 10 MG 24 hr tablet TAKE 1 TABLET(10 MG) BY MOUTH DAILY 90 tablet 1     insulin glargine (LANTUS  "SOLOSTAR) 100 unit/mL (3 mL) pen Inject 14 Units under the skin daily. Do not mix Lantus with any other insulin 2 adj dose pen 0     lisinopril (PRINIVIL,ZESTRIL) 20 MG tablet Take 1 tablet (20 mg total) by mouth daily. 90 tablet 3     MULTIVITAMIN ORAL Take by mouth.       OMEGA-3/DHA/EPA/FISH OIL (FISH OIL-OMEGA-3 FATTY ACIDS) 300-1,000 mg capsule Take 2 g by mouth daily.       tamsulosin (FLOMAX) 0.4 mg Cp24 Take 1 capsule (0.4 mg total) by mouth Daily after breakfast. 90 capsule 2     liraglutide (VICTOZA) 0.6 mg/0.1 mL (18 mg/3 mL) PnIj injection Inject 0.1 mL (0.6 mg total) under the skin daily. With or without food. 3 mL 2     metFORMIN (GLUCOPHAGE XR) 500 MG 24 hr tablet Take 4 tablets (2,000 mg total) by mouth daily with breakfast. 360 tablet 0     No current facility-administered medications for this visit.      No Known Allergies  Review of Systems   Constitutional: Negative.    HENT: Negative.    Eyes: Negative.    Respiratory: Negative.    Cardiovascular: Negative.    Gastrointestinal: Negative.    Endocrine: Negative.    Genitourinary: Negative.    Musculoskeletal: Negative.    Skin: Negative.    Neurological: Negative.    Hematological: Negative.    Psychiatric/Behavioral: Negative.       OBJECTIVE: /80  Pulse 92  Resp 16  Ht 5' 8\" (1.727 m)  Wt (!) 241 lb (109.3 kg)  BMI 36.64 kg/m2  Physical Exam   Constitutional: He is oriented to person, place, and time. He appears well-developed and well-nourished. No distress.   HENT:   Head: Normocephalic and atraumatic.   Eyes: Conjunctivae are normal.   Neck: Neck supple.   Cardiovascular: Normal rate and regular rhythm.    Pulmonary/Chest: Effort normal.   Musculoskeletal: Normal range of motion.   Neurological: He is alert and oriented to person, place, and time.   Skin: Skin is warm and dry.   Psychiatric: He has a normal mood and affect.        Lab Results   Component Value Date    HGBA1C 7.6 (H) 02/06/2018      "

## 2021-06-15 NOTE — TELEPHONE ENCOUNTER
RN cannot approve Refill Request    RN can NOT refill this medication Protocol failed and NO refill given. Last office visit: 12/18/2020 Priti Charles MD Last Physical: 11/27/2019 Last MTM visit: Visit date not found Last visit same specialty: 12/18/2020 Priti Charles MD.  Next visit within 3 mo: Visit date not found  Next physical within 3 mo: Visit date not found      Seth ALEX Juan, [facility] Triage/Med Refill 2/24/2021    Requested Prescriptions   Pending Prescriptions Disp Refills     TRULICITY 1.5 mg/0.5 mL PnIj [Pharmacy Med Name: TRULICITY 1.5MG/0.5ML SDP 0.5ML] 30 mL 0     Sig: INJECT 1.5 MG UNDER THE SKIN EVERY 7 DAYS       Insulin/GLP-1 Refill Protocol Failed - 2/24/2021  3:16 AM        Failed - Microalbumin in last year     Microalbumin, Random Urine   Date Value Ref Range Status   11/27/2019 1.44 0.00 - 1.99 mg/dL Final                  Passed - Visit with PCP or prescribing provider visit in last 6 months     Last office visit with prescriber/PCP: 12/18/2020 OR same dept: 12/18/2020 Priti Charles MD OR same specialty: 12/18/2020 Priti Charles MD Last physical: Visit date not found Last MTM visit: Visit date not found     Next appt within 3 mo: Visit date not found  Next physical within 3 mo: Visit date not found  Prescriber OR PCP: Priti Charles MD  Last diagnosis associated with med order: 1. Type 2 diabetes mellitus without complication, without long-term current use of insulin (H)  - TRULICITY 1.5 mg/0.5 mL PnIj [Pharmacy Med Name: TRULICITY 1.5MG/0.5ML SDP 0.5ML]; INJECT 1.5 MG UNDER THE SKIN EVERY 7 DAYS  Dispense: 30 mL; Refill: 0    If protocol passes may refill for 6 months if within 3 months of last provider visit (or a total of 9 months).              Passed - A1C in last 6 months     Hemoglobin A1c   Date Value Ref Range Status   12/18/2020 6.5 (H) <=5.6 % Final               Passed - Blood pressure in last year     BP Readings from Last 1 Encounters:    12/18/20 124/70             Passed - Creatinine done in last year     Creatinine   Date Value Ref Range Status   12/18/2020 0.68 (L) 0.70 - 1.30 mg/dL Final

## 2021-06-16 ENCOUNTER — COMMUNICATION - HEALTHEAST (OUTPATIENT)
Dept: FAMILY MEDICINE | Facility: CLINIC | Age: 71
End: 2021-06-16

## 2021-06-16 PROBLEM — M79.642 PAIN IN BOTH HANDS: Status: ACTIVE | Noted: 2017-10-25

## 2021-06-16 PROBLEM — D50.9 MICROCYTIC ANEMIA: Status: ACTIVE | Noted: 2019-11-28

## 2021-06-16 PROBLEM — Z97.4 WEARS HEARING AID: Status: ACTIVE | Noted: 2017-06-20

## 2021-06-16 PROBLEM — R41.89 COGNITIVE DEFICITS: Status: ACTIVE | Noted: 2019-11-28

## 2021-06-16 PROBLEM — M79.641 PAIN IN BOTH HANDS: Status: ACTIVE | Noted: 2017-10-25

## 2021-06-16 NOTE — TELEPHONE ENCOUNTER
Left message to call back for: Pt  Information to relay to patient:  Please relay MD's message to pt. xl

## 2021-06-16 NOTE — TELEPHONE ENCOUNTER
RN cannot approve Refill Request    RN can NOT refill this medication PCP messaged that patient is overdue for Labs. Last office visit: 12/18/2020 Priti Charles MD Last Physical: 11/27/2019 Last MTM visit: Visit date not found Last visit same specialty: 12/18/2020 Priti Charles MD.  Next visit within 3 mo: Visit date not found  Next physical within 3 mo: Visit date not found      Angeli Shirley, [facility] Triage/Med Refill 3/24/2021    Requested Prescriptions   Pending Prescriptions Disp Refills     metFORMIN (GLUCOPHAGE) 500 MG tablet [Pharmacy Med Name: METFORMIN 500MG TABLETS] 360 tablet 0     Sig: TAKE 2 TABLETS(1000 MG) BY MOUTH TWICE DAILY WITH MEALS       Metformin Refill Protocol Failed - 3/24/2021  3:16 AM        Failed - Microalbumin in last year      Microalbumin, Random Urine   Date Value Ref Range Status   11/27/2019 1.44 0.00 - 1.99 mg/dL Final                  Passed - Blood pressure in last 12 months     BP Readings from Last 1 Encounters:   12/18/20 124/70             Passed - LFT or AST or ALT in last 12 months     Albumin   Date Value Ref Range Status   12/18/2020 3.4 (L) 3.5 - 5.0 g/dL Final     Bilirubin, Total   Date Value Ref Range Status   12/18/2020 0.3 0.0 - 1.0 mg/dL Final     Bilirubin, Direct   Date Value Ref Range Status   03/07/2020 0.2 <=0.5 mg/dL Final     Alkaline Phosphatase   Date Value Ref Range Status   12/18/2020 88 45 - 120 U/L Final     AST   Date Value Ref Range Status   12/18/2020 11 0 - 40 U/L Final     ALT   Date Value Ref Range Status   12/18/2020 9 0 - 45 U/L Final     Protein, Total   Date Value Ref Range Status   12/18/2020 6.8 6.0 - 8.0 g/dL Final                Passed - GFR or Serum Creatinine in last 6 months     GFR MDRD Non Af Amer   Date Value Ref Range Status   12/18/2020 >60 >60 mL/min/1.73m2 Final     GFR MDRD Af Amer   Date Value Ref Range Status   12/18/2020 >60 >60 mL/min/1.73m2 Final             Passed - Visit with PCP or prescribing  provider visit in last 6 months or next 3 months     Last office visit with prescriber/PCP: 12/18/2020 OR same dept: 12/18/2020 Priti Charles MD OR same specialty: 12/18/2020 Priti Charles MD Last physical: Visit date not found Last MTM visit: Visit date not found         Next appt within 3 mo: Visit date not found  Next physical within 3 mo: Visit date not found  Prescriber OR PCP: Priti Charles MD  Last diagnosis associated with med order: 1. Type 2 diabetes mellitus without complication (H)  - metFORMIN (GLUCOPHAGE) 500 MG tablet [Pharmacy Med Name: METFORMIN 500MG TABLETS]; TAKE 2 TABLETS(1000 MG) BY MOUTH TWICE DAILY WITH MEALS  Dispense: 360 tablet; Refill: 0     If protocol passes may refill for 12 months if within 3 months of last provider visit (or a total of 15 months).           Passed - A1C in last 6 months     Hemoglobin A1c   Date Value Ref Range Status   12/18/2020 6.5 (H) <=5.6 % Final

## 2021-06-17 ENCOUNTER — COMMUNICATION - HEALTHEAST (OUTPATIENT)
Dept: FAMILY MEDICINE | Facility: CLINIC | Age: 71
End: 2021-06-17

## 2021-06-17 DIAGNOSIS — E11.9 TYPE 2 DIABETES MELLITUS WITHOUT COMPLICATION, WITHOUT LONG-TERM CURRENT USE OF INSULIN (H): ICD-10-CM

## 2021-06-17 DIAGNOSIS — I10 ESSENTIAL HYPERTENSION: ICD-10-CM

## 2021-06-17 RX ORDER — GLIPIZIDE 10 MG/1
TABLET, FILM COATED, EXTENDED RELEASE ORAL
Qty: 90 TABLET | Refills: 2 | Status: SHIPPED | OUTPATIENT
Start: 2021-06-17 | End: 2021-07-21

## 2021-06-17 RX ORDER — AMLODIPINE BESYLATE 10 MG/1
TABLET ORAL
Qty: 90 TABLET | Refills: 3 | Status: SHIPPED | OUTPATIENT
Start: 2021-06-17 | End: 2021-07-21

## 2021-06-17 RX ORDER — LISINOPRIL 20 MG/1
TABLET ORAL
Qty: 90 TABLET | Refills: 2 | Status: SHIPPED | OUTPATIENT
Start: 2021-06-17 | End: 2021-07-21

## 2021-06-17 NOTE — PATIENT INSTRUCTIONS - HE
Patient Instructions by Cesar Ochoa MD at 11/28/2019  3:00 PM     Author: Cesar Ochoa MD Service: -- Author Type: Physician    Filed: 11/28/2019  4:58 PM Encounter Date: 11/28/2019 Status: Addendum    : Cesar Ochoa MD (Physician)    Related Notes: Original Note by Cesar Ochoa MD (Physician) filed at 11/28/2019  4:57 PM       -Your hemoglobin is slightly lower but stable (7.7 versus 8.1). It is unclear if this is just variability in testing or an actual drop in your hemoglobin level.  -Your decreasing hemoglobin appears to be occurring gradually based on your slow progression of symptoms over the last few weeks.  -Your feces tested negative for blood tonight.  -Tests to assess for folate and iron deficiency are in process.  -Please see Dr. Charles for follow up tomorrow for recheck of hemoglobin and planning for further workup and monitoring.  -Please hold your aspirin until you see Dr. Charles for follow-up.  -Please call 911 if you develop chest pain, difficulty breathing, or severe lightheadedness, or if you pass out.  -Please go to the nearest ER if you vomit up blood or anything that look like coffee grounds, or if you develop severe abdominal pain, bloody stools, or black tarry looking stools.  Patient Education     Anemia, Type Not Specified (Adult)  Red blood cells carry oxygen to the tissues of your body. Anemia is a condition in which you have too few red blood cells. You need iron to make red blood cells. The most common cause of anemia is not having enough iron. This may be because of:    Loss of blood. This can be caused by heavy menstrual periods. It can also be caused by bleeding from the stomach or intestines.    Poor diet. You may not be eating enough foods that contain iron.  Other causes of anemia include certain vitamin deficiencies, chronic kidney disease, and certain other chronic illnesses.  Anemia makes you to feel tired and run down. When anemia becomes  severe, your skin becomes pale. You may feel short of breath after physical activity. Other symptoms include:    Headaches    Dizziness    Leg cramps with physical activity    Drowsiness  Home care  Follow these guidelines when caring for yourself at home:    Dont overexert yourself.    Talk with your healthcare provider before traveling by air or traveling to high altitudes.  Follow-up care  Follow up with your healthcare provider, or as advised. You may need other blood tests to find out the exact cause of your anemia. If you had testing done today, it may take several days to get all of the results. You can follow up with your own healthcare provider to get the results.  When to seek medical advice  Call your healthcare provider right away if any of these occur:    Shortness of breath or chest pain    Dizziness or fainting gets worse    Vomiting blood or passing red or black-colored stool  Date Last Reviewed: 2/25/2016 2000-2017 [employer]. [address] [place] [address], [place] [address]. All rights reserved. This information is not intended as a substitute for professional medical care. Always follow your healthcare professional's instructions.

## 2021-06-17 NOTE — PATIENT INSTRUCTIONS - HE
Patient Instructions by Priti Charles MD at 11/27/2019  2:40 PM     Author: Priti Charles MD Service: -- Author Type: Physician    Filed: 11/27/2019  2:53 PM Encounter Date: 11/27/2019 Status: Signed    : Priti Charles MD (Physician)         Patient Education     Exercise for a Healthier Heart  You may wonder how you can improve the health of your heart. If youre thinking about exercise, youre on the right track. You dont need to become an athlete, but you do need a certain amount of brisk exercise to help strengthen your heart. If you have been diagnosed with a heart condition, your doctor may recommend exercise to help stabilize your condition. To help make exercise a habit, choose safe, fun activities.       Be sure to check with your health care provider before starting an exercise program.    Why exercise?  Exercising regularly offers many healthy rewards. It can help you do all of the following:    Improve your blood cholesterol levels to help prevent further heart trouble    Lower your blood pressure to help prevent a stroke or heart attack    Control diabetes, or reduce your risk of getting this disease    Improve your heart and lung function    Reach and maintain a healthy weight    Make your muscles stronger and more limber so you can stay active    Prevent falls and fractures by slowing the loss of bone mass (osteoporosis)    Manage stress better  Exercise tips  Ease into your routine. Set small goals. Then build on them.  Exercise on most days. Aim for a total of 150 or more minutes of moderate to  vigorous intensity activity each week. Consider 40 minutes, 3 to 4 times a week. For best results, activity should last for 40 minutes on average. It is OK to work up to the 40 minute period over time. Examples of moderate-intensity activity is walking one mile in 15 minutes or 30 to 45 minutes of yard work.  Step up your daily activity level. Along with your exercise program, try being  more active throughout the day. Walk instead of drive. Do more household tasks or yard work.  Choose one or more activities you enjoy. Walking is one of the easiest things you can do. You can also try swimming, riding a bike, or taking an exercise class.  Stop exercising and call your doctor if you:    Have chest pain or feel dizzy or lightheaded    Feel burning, tightness, pressure, or heaviness in your chest, neck, shoulders, back, or arms    Have unusual shortness of breath    Have increased joint or muscle pain    Have palpitations or an irregular heartbeat      9368-7882 [employer]. [address] [place] [address]. All rights reserved. This information is not intended as a substitute for professional medical care. Always follow your healthcare professional's instructions.         Patient Education   Understanding [employer] MyPlate  The USDA (US Department of Agriculture) has guidelines to help you make healthy food choices. These are called MyPlate. MyPlate shows the food groups that make up healthy meals using the image of a place setting. Before you eat, think about the healthiest choices for what to put onto your plate or into your cup or bowl. To learn more about building a healthy plate, visit www.choosemyplate.gov.       The Food Groups    Fruits: Any fruit or 100% fruit juice counts as part of the Fruit Group. Fruits may be fresh, canned, frozen, or dried, and may be whole, cut-up, or pureed. Make half your plate fruits and vegetables.    Vegetables: Any vegetable or 100% vegetable juice counts as a member of the Vegetable Group. Vegetables may be fresh, frozen, canned, or dried. They can be served raw or cooked and may be whole, cut-up, or mashed. Make half your plate fruits and vegetables.     Grains: All foods made from grains are part of the Grains Group. These include wheat, rice, oats, cornmeal, and barley such as bread, pasta, oatmeal, cereal, tortillas, and grits. Grains  should be no more than a quarter of your plate. At least half of your grains should be whole grains.    Protein: This group includes meat, poultry, seafood, beans and peas, eggs, processed soy products (like tofu), nuts (including nut butters), and seeds. Make protein choices no more than a quarter of your plate. Meat and poultry choices should be lean or low fat.    Dairy: All fluid milk products and foods made from milk that contain calcium, like yogurt and cheese are part of the Dairy Group. (Foods that have little calcium, such as cream, butter, and cream cheese, are not part of the group.) Most dairy choices should be low-fat or fat-free.    Oils: These are fats that are liquid at room temperature. They include canola, corn, olive, soybean, and sunflower oil. Foods that are mainly oil include mayonnaise, certain salad dressings, and soft margarines. You should have only 5 to 7 teaspoons of oils a day. You probably already get this much from the food you eat.  Use Tk20 to Help Build Your Meals  The SuperTracker can help you plan and track your meals and activity. You can look up individual foods to see or compare their nutritional value. You can get guidelines for what and how much you should eat. You can compare your food choices. And you can assess personal physical activities and see ways you can improve. Go to www.I'mOK.gov/supertracker/.    4318-1763 [employer]. [address] [place] [address]. All rights reserved. This information is not intended as a substitute for professional medical care. Always follow your healthcare professional's instructions.           Patient Education   Signs of Hearing Loss  Hearing loss is a problem shared by many people. In fact, it is one of the most common health conditions, particularly as people age. Most people over age 65 have some hearing loss, and by age 80, almost everyone does. Because hearing loss usually occurs slowly over  the years, you may not realize your hearing ability has gotten worse.       Have your hearing checked  Contact your [facility] care provider if you:    Have to strain to hear normal conversation.    Have to watch other peoples faces very carefully to follow what theyre saying.    Need to ask people to repeat what theyve said.    Often misunderstand what people are saying.    Turn the volume of the television or radio up so high that others complain.    Feel that people are mumbling when theyre talking to you.    Find that the effort to hear leaves you feeling tired and irritated.    Notice, when using the phone, that you hear better with 1 ear than the other.    7080-4297 [employer]. [address] [place] [address], [place] [address]. All rights reserved. This information is not intended as a substitute for professional medical care. Always follow your healthcare professional's instructions.         Patient Education   Understanding Advance Care Planning  Advance care planning is the process of deciding ones own future medical care. It helps ensure that if you cant speak for yourself, your wishes can still be carried out. The plan is a series of legal documents that note a persons wishes. The documents vary by state. Advance care planning may be done when a person has a serious illness that is expected to get worse. It may be done before major surgery. And it can help you and your family be prepared in case of a major illness or injury. Advance care planning helps with making decisions at these times.       A health care proxy is a person who acts as the voice of a patient when the patient cant speak for himself or herself. The name of this role varies by state. It may be called a Durable Medical Power of  or Durable Power of  for Healthcare. It may be called an agent, surrogate, or advocate. Or it may be called a representative or decision maker. It is an official duty that is identified by a legal  document. The document also varies by state.    Why Is Advance Care Planning Important?  If a person communicates their healthcare wishes:    They will be given medical care that matches their values and goals.    Their family members will not be forced to make decisions in a crisis with no guidance.  Creating a Plan  Making an advance care plan is often done in 3 steps:    Thinking about ones wishes. To create an advance care plan, you should think about what kind of medical treatment you would want if you lose the ability to communicate. Are there any situations in which you would refuse or stop treatment? Are there therapies you would want or not want? And whom do you want to make decisions for you? There are many places to learn more about how to plan for your care. Ask your doctor or  for resources.    Picking a health care proxy. This means choosing a trusted person to speak for you only when you cant speak for yourself. When you cannot make medical decisions, your proxy makes sure the instructions in your advance care plan are followed. A proxy does not make decisions based on his or her own opinions. They must put aside those opinions and values if needed, and carry out your wishes.    Filling out the legal documents. There are several kinds of legal documents for advance care planning. Each one tells health care providers your wishes. The documents may vary by state. They must be signed and may need to be witnessed or notarized. You can cancel or change them whenever you wish. Depending on your state, the documents may include a Healthcare Proxy form, Living Will, Durable Medical Power of , Advance Directive, or others.  The Familys Role  The best help a family can give is to support their loved ones wishes. Open and honest communication is vital. Family should express any concerns they have about the patients choices while the patient can still make decisions.    5767-5288 [facility]  [employer]. [address] [place] [address], [place] [address]. All rights reserved. This information is not intended as a substitute for professional medical care. Always follow your healthcare professional's instructions.         Also, [facility] offers a free, downloadable health care directive that allows you to share your treatment choices and personal preferences if you cannot communicate your wishes. It also allows you to appoint another person (called a health care agent) to make health care decisions if you are unable to do so. You can download an advance directive by going here: [email or web address]     Patient Education   Personalized Prevention Plan  You are due for the preventive services outlined below.  Your care team is available to assist you in scheduling these services.  If you have already completed any of these items, please share that information with your care team to update in your medical record.  Health Maintenance   Topic Date Due   ? HEPATITIS C SCREENING  1950   ? DIABETIC EYE EXAM  1950   ? ZOSTER VACCINES (1 of 2) 06/07/2001   ? MEDICARE ANNUAL WELLNESS VISIT  06/07/2016   ? DIABETIC FOOT EXAM  10/25/2018   ? TD 18+ HE  05/11/2019   ? A1C  05/28/2019   ? LIPID  07/17/2019   ? INFLUENZA VACCINE RULE BASED (1) 08/01/2019   ? COLONOSCOPY  09/14/2019   ? BMP  11/28/2019   ? MICROALBUMIN  11/28/2019   ? FALL RISK ASSESSMENT  11/28/2019   ? ADVANCE CARE PLANNING  07/17/2023   ? PNEUMOCOCCAL IMMUNIZATION 65+ LOW/MEDIUM RISK  Completed

## 2021-06-18 ENCOUNTER — OFFICE VISIT - HEALTHEAST (OUTPATIENT)
Dept: FAMILY MEDICINE | Facility: CLINIC | Age: 71
End: 2021-06-18

## 2021-06-18 DIAGNOSIS — D50.9 MICROCYTIC ANEMIA: ICD-10-CM

## 2021-06-18 LAB
ALBUMIN UR-MCNC: NEGATIVE G/DL
APPEARANCE UR: CLEAR
BASOPHILS # BLD AUTO: 0.1 THOU/UL (ref 0–0.2)
BASOPHILS NFR BLD AUTO: 1 % (ref 0–2)
BILIRUB UR QL STRIP: NEGATIVE
COLOR UR AUTO: NORMAL
EOSINOPHIL # BLD AUTO: 0.2 THOU/UL (ref 0–0.4)
EOSINOPHIL NFR BLD AUTO: 3 % (ref 0–6)
ERYTHROCYTE [DISTWIDTH] IN BLOOD BY AUTOMATED COUNT: 16.4 % (ref 11–14.5)
GLUCOSE UR STRIP-MCNC: NEGATIVE MG/DL
HCT VFR BLD AUTO: 26.7 % (ref 40–54)
HGB BLD-MCNC: 7.6 G/DL (ref 14–18)
HGB UR QL STRIP: NEGATIVE
IMM GRANULOCYTES # BLD: 0 THOU/UL
IMM GRANULOCYTES NFR BLD: 0 %
IRON SATN MFR SERPL: 4 % (ref 20–50)
IRON SERPL-MCNC: 13 UG/DL (ref 42–175)
KETONES UR STRIP-MCNC: NEGATIVE MG/DL
LEUKOCYTE ESTERASE UR QL STRIP: NEGATIVE
LYMPHOCYTES # BLD AUTO: 2.1 THOU/UL (ref 0.8–4.4)
LYMPHOCYTES NFR BLD AUTO: 25 % (ref 20–40)
MCH RBC QN AUTO: 21 PG (ref 27–34)
MCHC RBC AUTO-ENTMCNC: 28.5 G/DL (ref 32–36)
MCV RBC AUTO: 74 FL (ref 80–100)
MONOCYTES # BLD AUTO: 0.8 THOU/UL (ref 0–0.9)
MONOCYTES NFR BLD AUTO: 10 % (ref 2–10)
NEUTROPHILS # BLD AUTO: 5.2 THOU/UL (ref 2–7.7)
NEUTROPHILS NFR BLD AUTO: 62 % (ref 50–70)
NITRATE UR QL: NEGATIVE
PH UR STRIP: 6.5 [PH] (ref 5–8)
PLATELET # BLD AUTO: 507 THOU/UL (ref 140–440)
PMV BLD AUTO: 9.6 FL (ref 8.5–12.5)
RBC # BLD AUTO: 3.62 MILL/UL (ref 4.4–6.2)
RETICS # AUTO: 0.04 MILL/UL (ref 0.01–0.11)
RETICS/RBC NFR AUTO: 1.02 % (ref 0.8–2.7)
SP GR UR STRIP: 1.01 (ref 1–1.03)
TIBC SERPL-MCNC: 344 UG/DL (ref 313–563)
TRANSFERRIN SERPL-MCNC: 275 MG/DL (ref 212–360)
UROBILINOGEN UR STRIP-ACNC: NORMAL
WBC: 8.4 THOU/UL (ref 4–11)

## 2021-06-18 NOTE — PATIENT INSTRUCTIONS - HE
Patient Instructions by Kedar Gonzalez MD at 3/7/2020 10:50 AM     Author: Kedar Gonzalez MD Service: -- Author Type: Physician    Filed: 3/7/2020 11:17 AM Encounter Date: 3/7/2020 Status: Signed    : Kedar Gonzalez MD (Physician)       Patient Education     Abdominal Pain    Abdominal pain is pain in the stomach or belly area. Everyone has this pain from time to time. In many cases it goes away on its own. But abdominal pain can sometimes be due to a serious problem, such as appendicitis. So its important to know when to get help.  Causes of abdominal pain  There are many possible causes of abdominal pain. Common causes in adults include:    Constipation, diarrhea, or gas    Stomach acid flowing back up into the esophagus (acid reflux or heartburn)    Severe acid reflux, called GERD (gastroesophageal reflux disease)    A sore in the lining of the stomach or small intestine (peptic ulcer)    Inflammation of the gallbladder, liver, or pancreas    Gallstones or kidney stones    Appendicitis     Intestinal blockage     An internal organ pushing through a muscle or other tissue (hernia)    Urinary tract infections    In women, menstrual cramps, fibroids, ovarian cysts, pelvic inflammatory disease, or endometriosis    Inflammation or infection of the intestines, including Crohn's disease and ulcerative colitis    Irritable bowel syndrome  Diagnosing the cause of abdominal pain  Your healthcare provider will give you a physical exam help find the cause of your pain. If needed, you will have tests. Belly pain has many possible causes. So it can be hard to find the reason for your pain. Giving details about your pain can help. Tell your provider where and when you feel the pain, and what makes it better or worse. Also let your provider know if you have other symptoms such as:    Fever    Tiredness    Upset stomach (nausea)    Vomiting    Changes in bathroom habits    Blood in  the stool or black, tarry stool    Weight loss that you can't explain (involuntary weight loss?)  Also report any family history of stomach or intestinal problems, or cancers. Tell your provider about all your alcohol use and drug use. Tell your provider about all medicines you use, including herbs, vitamins, and supplements.  Treating abdominal pain  Some causes of pain need emergency medical treatment right away. These include appendicitis or a bowel blockage. Other problems can be treated with rest, fluids, or medicines. Your healthcare provider can give you specific instructions for treatment or self-care based on what is causing your pain.     If you have vomiting or diarrhea, sip water or other clear fluids. When you are ready to eat solid foods again, start with small amounts of easy-to-digest, low-fat foods. These include apple sauce, toast, or crackers.  When to get medical care  Call 911 or go to the hospital right away if you:    Cant pass stool and are vomiting    Are vomiting blood or have bloody diarrhea or black, tarry diarrhea    Have chest, neck, or shoulder pain    Feel like you might pass out    Have pain in your shoulder blades with nausea    Have sudden, severe belly pain    Have new, severe pain unlike any you have felt before    Have a belly that is rigid, hard, and hurts to touch  Call your healthcare provider if you have:    Pain for more than 5 days    Bloating for more than 2 days    Diarrhea for more than 5 days    A fever of 100.4 F (38 C) or higher, or as directed by your healthcare provider    Pain that gets worse    Weight loss for no reason    Continued lack of appetite    Blood in your stool  How to prevent abdominal pain  Here are some tips to help prevent abdominal pain:    Eat smaller amounts of food at each meal.    Don't eat greasy, fried, or other high-fat foods.    Don't eat foods that give you gas.    Exercise regularly.    Drink plenty of fluids.  To help prevent GERD  symptoms:    Quit smoking.    Reduce alcohol and foods that increase stomach acid.    Don't use aspirin or over-the-counter pain and fever medicines, if possible. This includes nonsteroidal anti-inflammatory drugs (NSAIDs).    Lose excess weight.    Finish eating at least 2 hours before you go to bed or lie down.    Raise the head of your bed.  Date Last Reviewed: 7/1/2016 2000-2019 [employer] [address] [place] [address], [place] [id number]. All rights reserved. This information is not intended as a substitute for professional medical care. Always follow your healthcare professional's instructions.

## 2021-06-18 NOTE — ASSESSMENT & PLAN NOTE
Worsening microcytic anemia.  This is happened to him in the past and iron supplements helped.  At this time CBC today does show thatthe hemoglobin is stable.  He says he is tired but he does not have any heart racing, shortness of breath or chest pain or lightheadedness.  I did advise him to go straight to the emergency room if he gets any of the above symptoms.    Tests were done for iron and iron binding capacity, fecal occult blood, reticulocyte count, UA and colonoscopy to look for iron deficiency, blood loss through GI tract, he is due for colonoscopy anyway so with his anemia I would like to get a diagnostic colonoscopy and have advised him not to do the Cologuard test which was sent to him previously.  We will also get reticulocytes to see if he is hyperproductive.  And a UA to make sure he is not losing blood through his urine.

## 2021-06-19 NOTE — LETTER
Letter by Priti Charles MD at      Author: Priti Charles MD Service: -- Author Type: --    Filed:  Encounter Date: 4/30/2019 Status: (Other)       Regis Brooke   [address] [place] [address]      4/30/2019       Dear Regis,       In reviewing your records, we have determined a gap in your preventive services. Based on your age and health history, we recommend the follow service.     ? General Physical  ? Physical with a Pap Smear  ? Colon cancer screening  ? Mammogram  ? Immunization  ? Diabetic check  ? Blood pressure/cardiovascular check  ? Asthma check  ? Cholesterol test  ? Lab work  ? Med check      If you have had the service elsewhere, please contact us so we can update our records. Please let us know if you have transferred your care to another clinic.    Please call [phone] to schedule this appointment.    We believe that a strong preventive care program, including regular physicals and follow-up care is an important part of a healthy lifestyle and we are committed to helping you maintain your health.    Thank you for choosing us as your health care provider.    Sincerely,     [facility]

## 2021-06-19 NOTE — PROGRESS NOTES
ASSESSMENT AND PLAN:     Problem List Items Addressed This Visit        Unprioritized    Hyperlipidemia    Relevant Orders    Lipid [place]    Hypertension    Relevant Orders    Comprehensive Metabolic Panel    HM1(CBC and Differential) (Completed)    HM1 (CBC with Diff) (Completed)    Type 2 diabetes mellitus without complication (H) - Primary     Lab Results   Component Value Date    HGBA1C 9.1 (H) 07/17/2018     a1c was 6.5 then went up to 7.6 when he discontinued Lantus due to cost.  Last time we visited I recommended Victoza but he did not start that.  His A1c is now up to 9.1 and so I have again recommended starting Victoza.  We did discuss that if his blood sugars continued to deteriorate he will probably end up in the hospital.  If Victoza is cost prohibitive we could consider Trulicity, Jardiance or something else we could also consider mealtime insulins.    Continue metformin 2000 mg once daily   Continue glizipide 10 mg xr daily.   Start victoza he says he already has some and knows how to take it.   Wants to start eating at home more because they eat out a lot.  Follow-up in 1 -3 months.  If not improving would recommend endocrinology consult.      Lab Results   Component Value Date    LDLCALC 65 06/20/2017    make sure ldl is current within the last year.  Goal less than 100 for people without CVD and less than 70 with CVD.     Repeat lipid ordered 7/17/2018     BP Readings from Last 3 Encounters:   07/17/18 122/80   02/06/18 126/80   10/25/17 128/84   at goal: less than 140/90  Continue lisinopril 20 mg po q day  And continue norvasc 10 mg po q day.     Statin goal - taking lipitor 80 mg po q day.     History   Smoking Status     Passive Smoke Exposure - Never Smoker   Smokeless Tobacco     Never Used        ASA  -she does take this    Lab Results   Component Value Date    MICROALBUR 6.44 (H) 03/21/2017      microalbumin on file every 12 months.  Ordered again 7/17/2018     Eye exam within the last 12  months his last exam was 5/2017 and he is due.  This was ordered today.    Foot exam done 10/2017    Ace/ arb for renal protection - he is on lisinopril.    Dietician Ho Avila RD - consult ordered    Diabetes ed due - ordered again.    Zoster vax due and discussed   Pneumovax 23 valent given today.         Relevant Orders    Glycosylated Hemoglobin A1c (Completed)    Microalbumin, Random Urine    Ambulatory referral to Ophthalmology    Ambulatory referral to Diabetes Education Program (Existing Diagnosis)    Ambulatory referral to Nutrition Services    Vitamin D Deficiency    Relevant Orders    Vitamin D, Total (25-Hydroxy)    Preventative health care     I have had an Advance Directives discussion with the patient.   Pneumovax 23 valent was given today  Zostavax was discussed and he will check with his insurance on cost             Other Visit Diagnoses     Screening for thyroid disorder        Relevant Orders    Thyroid [place]    Need for pneumococcal vaccination        Relevant Orders    Pneumococcal polysaccharide vaccine 23-valent 1 yo or older, subq/IM (Completed)           Chief Complaint   Patient presents with     Diabetes        HPI  Regis Brooke is a 68 y.o. male comes in with his wife for follow-up on his diabetes and hypertension.  His blood sugars have been running 110-400 at home.  He says he feels pretty lousy when his blood sugars are really high up at 400 but if they are lower than that he feels just fine.    Last time he was here he remembers that I told him to take Victoza but he never did start that.    His blood sugars have actually been much worse since he stopped Lantus due to cost back about 6 months ago.  He knows that he needs to do something about his blood sugars but has not actually done anything yet.    He and his wife say that they got to eat quite a bit and they know that they should stop doing this and eat healthy home-cooked meals more often.     I have had an Advance  Directives discussion with the patient.   He and his wife have discussed his wishes and they do not want to complete an advanced directive at this time.    History   Smoking Status     Passive Smoke Exposure - Never Smoker   Smokeless Tobacco     Never Used      Current Outpatient Prescriptions on File Prior to Visit   Medication Sig Dispense Refill     amLODIPine (NORVASC) 10 MG tablet Take 1 tablet (10 mg total) by mouth daily. As directed 90 tablet 2     ASCORBATE CALCIUM (VITAMIN C ORAL) Take by mouth.       aspirin 81 MG EC tablet Take 81 mg by mouth daily.       atorvastatin (LIPITOR) 80 MG tablet TAKE 1 TABLET BY MOUTH DAILY 90 tablet 3     CALCIUM ORAL Take by mouth.       cholecalciferol, vitamin D3, 400 unit cap Take by mouth.       CRANBERRY EXTRACT (CRANBERRY ORAL) Take by mouth.       glipiZIDE (GLUCOTROL XL) 10 MG 24 hr tablet TAKE 1 TABLET(10 MG) BY MOUTH DAILY 90 tablet 1     lisinopril (PRINIVIL,ZESTRIL) 20 MG tablet TAKE 1 TABLET(20 MG) BY MOUTH DAILY 90 tablet 0     metFORMIN (GLUCOPHAGE-XR) 500 MG 24 hr tablet Take 4 tablets (2,000 mg total) by mouth daily with breakfast. 360 tablet 0     MULTIVITAMIN ORAL Take by mouth.       OMEGA-3/DHA/EPA/FISH OIL (FISH OIL-OMEGA-3 FATTY ACIDS) 300-1,000 mg capsule Take 2 g by mouth daily.       tamsulosin (FLOMAX) 0.4 mg Cp24 Take 1 capsule (0.4 mg total) by mouth Daily after breakfast. 90 capsule 2     [DISCONTINUED] insulin glargine (LANTUS SOLOSTAR) 100 unit/mL (3 mL) pen Inject 14 Units under the skin daily. Do not mix Lantus with any other insulin 2 adj dose pen 0     [DISCONTINUED] liraglutide (VICTOZA) 0.6 mg/0.1 mL (18 mg/3 mL) PnIj injection Inject 0.1 mL (0.6 mg total) under the skin daily. With or without food. 3 mL 2     No current facility-administered medications on file prior to visit.       No Known Allergies    Review of Systems   Constitutional: Negative.         Feels fine now, but notes general malaise when his blood sugars are really  "high. Notes blood sugars run 110 - 400 - checks sporadically and did not bring bs record today.   HENT: Negative.    Eyes: Negative.    Respiratory: Negative.    Cardiovascular: Negative.    Gastrointestinal: Negative.    Endocrine: Negative.    Genitourinary: Negative.    Musculoskeletal: Negative.    Skin: Negative.    Neurological: Negative.    Hematological: Negative.    Psychiatric/Behavioral: Negative.         OBJECTIVE: /80  Pulse 92  Resp 16  Ht 5' 8\" (1.727 m)  Wt (!) 233 lb (105.7 kg)  BMI 35.43 kg/m2   Physical Exam   Constitutional: He is oriented to person, place, and time. He appears well-developed and well-nourished. No distress.   HENT:   Head: Normocephalic and atraumatic.   Eyes: Conjunctivae are normal.   Neck: Neck supple.   Cardiovascular: Normal rate, regular rhythm and normal heart sounds.    Pulmonary/Chest: Effort normal and breath sounds normal.   Musculoskeletal: Normal range of motion.   Neurological: He is alert and oriented to person, place, and time.   Skin: Skin is warm and dry.   Psychiatric: He has a normal mood and affect.        This note was created using Dragon dictation.  Please excuse any grammatical errors.    "

## 2021-06-20 NOTE — LETTER
Letter by Odalis Pink at      Author: Odalis Pink Service: -- Author Type: --    Filed:  Encounter Date: 1/10/2020 Status: Signed       01/10/20      Regis Brooke  [address] [place] [address]    Dear Regis,    We received a referral from Dr. Castañeda for you to be seen as a new patient in our [facility] H[facility] department. Our clinic records indicate we have attempted to contact you to schedule a new patient appointment, but have not heard back from you after several attempts. Dr. Castañeda feels it is important for you to be seen in our clinic. To prevent further delays in your care please contact our office to schedule your Hematology appointment.      Sincerely,      Odalis Pink

## 2021-06-21 NOTE — PROGRESS NOTES
ASSESSMENT AND PLAN:     Problem List Items Addressed This Visit        Unprioritized    Hyperlipidemia - Primary    Relevant Orders    Lipid [place]    Hypertension     See diabetes         Relevant Orders    Comprehensive Metabolic Panel    Type 2 diabetes mellitus without complication (H)     Lab Results   Component Value Date    HGBA1C 7.1 (H) 11/28/2018   Continue metformin 2000 mg once daily   Continue glizipide 10 mg xr daily.   He is taking the lowest dose of Victoza which is his wife's leftover Victoza due to cost issue right now.  He is hoping insurance will cover it in the new year.  If not he would like to switch to Trulicity.    Lab Results   Component Value Date    LDLCALC 67 07/17/2018   make sure ldl is current within the last year.  Goal less than 100 for people without CVD and less than 70 with CVD.     BP Readings from Last 3 Encounters:   11/28/18 124/84   07/17/18 122/80   02/06/18 126/80   goal less than 140/90  Continue lisinopril 20 mg po q day  And continue norvasc 10 mg po q day.    Statin goal - taking lipitor 80 mg po q day.     Social History     Tobacco Use   Smoking Status Passive Smoke Exposure - Never Smoker   Smokeless Tobacco Never Used        ASA  - taking    Lab Results   Component Value Date    MICROALBUR 6.44 (H) 03/21/2017      microalbumin on file every 12 months. Ordered today.     Eye exam within the last 12 months - done 8/7/2018    Foot exam done 10/2017    Ace/ arb for renal protection - takes lisinopril 20 mg po q day    Dietician offered. Declined.         Relevant Orders    Glycosylated Hemoglobin A1c (Completed)    Microalbumin, Random Urine    Vitamin D Deficiency     Vitamin D, Total (25-Hydroxy)   Date Value Ref Range Status   07/17/2018 41.2 30.0 - 80.0 ng/mL Final                     Chief Complaint   Patient presents with     Diabetes        HPI  Regis Brooke is a 68 y.o. male comes in for follow-up of his diabetes.  Since I saw him back in July he has been  taking some leftover Victoza that his wife has not been using because she got side effects.  Unfortunately he does not think he will be able to afford it as we go forward and wonders if he could try and see if his insurance would cover Trulicity.  For now he still has leftover Victoza so he wants to continue to use that until he runs out.    Social History     Tobacco Use   Smoking Status Passive Smoke Exposure - Never Smoker   Smokeless Tobacco Never Used      Current Outpatient Medications on File Prior to Visit   Medication Sig Dispense Refill     amLODIPine (NORVASC) 10 MG tablet Take 1 tablet (10 mg total) by mouth daily. As directed 90 tablet 2     ASCORBATE CALCIUM (VITAMIN C ORAL) Take by mouth.       aspirin 81 MG EC tablet Take 81 mg by mouth daily.       atorvastatin (LIPITOR) 80 MG tablet TAKE 1 TABLET(80 MG) BY MOUTH DAILY 90 tablet 2     CALCIUM ORAL Take by mouth.       cholecalciferol, vitamin D3, 400 unit cap Take by mouth.       CRANBERRY EXTRACT (CRANBERRY ORAL) Take by mouth.       glipiZIDE (GLUCOTROL XL) 10 MG 24 hr tablet TAKE 1 TABLET(10 MG) BY MOUTH DAILY 30 tablet 0     lisinopril (PRINIVIL,ZESTRIL) 20 MG tablet Take 1 tablet (20 mg total) by mouth daily. 90 tablet 3     metFORMIN (GLUCOPHAGE) 500 MG tablet TAKE 2 TABLETS(1000 MG) BY MOUTH TWICE DAILY WITH MEALS 120 tablet 0     metFORMIN (GLUCOPHAGE-XR) 500 MG 24 hr tablet TAKE 4 TABLETS(2000 MG) BY MOUTH DAILY WITH BREAKFAST 360 tablet 0     MULTIVITAMIN ORAL Take by mouth.       OMEGA-3/DHA/EPA/FISH OIL (FISH OIL-OMEGA-3 FATTY ACIDS) 300-1,000 mg capsule Take 2 g by mouth daily.       tamsulosin (FLOMAX) 0.4 mg Cp24 TAKE 1 CAPSULE(0.4 MG) BY MOUTH DAILY AFTER BREAKFAST 90 capsule 3     No current facility-administered medications on file prior to visit.       No Known Allergies    Review of Systems   Constitutional: Negative.    HENT: Negative.    Eyes: Negative.    Respiratory: Negative.    Cardiovascular: Negative.    Gastrointestinal:  "Negative.    Endocrine: Negative.    Genitourinary: Negative.    Musculoskeletal: Negative.    Skin: Negative.    Neurological: Negative.    Hematological: Negative.    Psychiatric/Behavioral: Negative.         OBJECTIVE: /84   Pulse 92   Resp 16   Ht 5' 8\" (1.727 m)   Wt (!) 236 lb (107 kg)   BMI 35.88 kg/m     Physical Exam   Constitutional: He is oriented to person, place, and time. He appears well-developed and well-nourished. No distress.   HENT:   Head: Normocephalic and atraumatic.   Eyes: Conjunctivae are normal.   Neck: Neck supple.   Cardiovascular: Normal rate and regular rhythm.   Pulmonary/Chest: Effort normal.   Musculoskeletal: Normal range of motion.   Feet:   Right Foot:   Protective Sensation: 5 sites tested. 5 sites sensed.   Left Foot:   Protective Sensation: 5 sites tested. 5 sites sensed.   Neurological: He is alert and oriented to person, place, and time.   Skin: Skin is warm and dry.   Psychiatric: He has a normal mood and affect.      Orders Placed This Encounter   Procedures     Influenza High Dose, Seasonal 65+ yrs     Glycosylated Hemoglobin A1c     Microalbumin, Random Urine     Comprehensive Metabolic Panel     Lipid [place]        This note was created using Dragon dictation.  Please excuse any grammatical errors.    "

## 2021-06-22 NOTE — TELEPHONE ENCOUNTER
I just sent over Trulicity and some more test strips.  I am not 100% sure that they were the correct test strips so he should double check with the pharmacist before leaving there-we can change it if needed.

## 2021-06-25 NOTE — TELEPHONE ENCOUNTER
----- Message from Priti Charles MD sent at 6/15/2021  4:20 PM CDT -----  Recommend visit to discuss low hgb. Please schedule, needs serial repeat hgb too to ensure not dropping further.  Would explain why hes been feeling tired.

## 2021-06-25 NOTE — TELEPHONE ENCOUNTER
Left message to call back for: appt  Information to relay to patient: please see below relay and schedule

## 2021-06-25 NOTE — TELEPHONE ENCOUNTER
Refill Approved    Rx renewed per Medication Renewal Policy. Medication was last renewed on 12/18/20.    Seth Martinez, [facility] Triage/Med Refill 6/17/2021     Requested Prescriptions   Pending Prescriptions Disp Refills     amLODIPine (NORVASC) 10 MG tablet [Pharmacy Med Name: AMLODIPINE BESYLATE 10MG TABLETS] 90 tablet 1     Sig: TAKE 1 TABLET(10 MG) BY MOUTH DAILY AS DIRECTED       Calcium-Channel Blockers Protocol Passed - 6/17/2021  3:16 AM        Passed - PCP or prescribing provider visit in past 12 months or next 3 months     Last office visit with prescriber/PCP: 6/4/2021 Priti Charles MD OR same dept: 6/4/2021 Priti Charles MD OR same specialty: 6/4/2021 Priti Charles MD  Last physical: 11/27/2019 Last MTM visit: Visit date not found   Next visit within 3 mo: Visit date not found  Next physical within 3 mo: Visit date not found  Prescriber OR PCP: Priti Charles MD  Last diagnosis associated with med order: 1. Essential hypertension  - amLODIPine (NORVASC) 10 MG tablet [Pharmacy Med Name: AMLODIPINE BESYLATE 10MG TABLETS]; TAKE 1 TABLET(10 MG) BY MOUTH DAILY AS DIRECTED  Dispense: 90 tablet; Refill: 1  - lisinopriL (PRINIVIL,ZESTRIL) 20 MG tablet [Pharmacy Med Name: LISINOPRIL 20MG TABLETS]; TAKE 1 TABLET(20 MG) BY MOUTH DAILY  Dispense: 90 tablet; Refill: 1    2. Hypertension  - amLODIPine (NORVASC) 10 MG tablet [Pharmacy Med Name: AMLODIPINE BESYLATE 10MG TABLETS]; TAKE 1 TABLET(10 MG) BY MOUTH DAILY AS DIRECTED  Dispense: 90 tablet; Refill: 1  - lisinopriL (PRINIVIL,ZESTRIL) 20 MG tablet [Pharmacy Med Name: LISINOPRIL 20MG TABLETS]; TAKE 1 TABLET(20 MG) BY MOUTH DAILY  Dispense: 90 tablet; Refill: 1    3. Type 2 diabetes mellitus without complication, without long-term current use of insulin (H)  - glipiZIDE (GLUCOTROL XL) 10 MG 24 hr tablet [Pharmacy Med Name: GLIPIZIDE ER 10MG TABLETS]; TAKE 1 TABLET(10 MG) BY MOUTH DAILY  Dispense: 90 tablet; Refill: 1    If protocol  passes may refill for 12 months if within 3 months of last provider visit (or a total of 15 months).             Passed - Blood pressure filed in past 12 months     BP Readings from Last 1 Encounters:   06/04/21 112/60                lisinopriL (PRINIVIL,ZESTRIL) 20 MG tablet [Pharmacy Med Name: LISINOPRIL 20MG TABLETS] 90 tablet 1     Sig: TAKE 1 TABLET(20 MG) BY MOUTH DAILY       Ace Inhibitors Refill Protocol Passed - 6/17/2021  3:16 AM        Passed - PCP or prescribing provider visit in past 12 months       Last office visit with prescriber/PCP: 6/4/2021 Priti Charles MD OR same dept: 6/4/2021 Priti Charles MD OR same specialty: 6/4/2021 Priti Charles MD  Last physical: 11/27/2019 Last MTM visit: Visit date not found   Next visit within 3 mo: Visit date not found  Next physical within 3 mo: Visit date not found  Prescriber OR PCP: Priti Charles MD  Last diagnosis associated with med order: 1. Essential hypertension  - amLODIPine (NORVASC) 10 MG tablet [Pharmacy Med Name: AMLODIPINE BESYLATE 10MG TABLETS]; TAKE 1 TABLET(10 MG) BY MOUTH DAILY AS DIRECTED  Dispense: 90 tablet; Refill: 1  - lisinopriL (PRINIVIL,ZESTRIL) 20 MG tablet [Pharmacy Med Name: LISINOPRIL 20MG TABLETS]; TAKE 1 TABLET(20 MG) BY MOUTH DAILY  Dispense: 90 tablet; Refill: 1    2. Hypertension  - amLODIPine (NORVASC) 10 MG tablet [Pharmacy Med Name: AMLODIPINE BESYLATE 10MG TABLETS]; TAKE 1 TABLET(10 MG) BY MOUTH DAILY AS DIRECTED  Dispense: 90 tablet; Refill: 1  - lisinopriL (PRINIVIL,ZESTRIL) 20 MG tablet [Pharmacy Med Name: LISINOPRIL 20MG TABLETS]; TAKE 1 TABLET(20 MG) BY MOUTH DAILY  Dispense: 90 tablet; Refill: 1    3. Type 2 diabetes mellitus without complication, without long-term current use of insulin (H)  - glipiZIDE (GLUCOTROL XL) 10 MG 24 hr tablet [Pharmacy Med Name: GLIPIZIDE ER 10MG TABLETS]; TAKE 1 TABLET(10 MG) BY MOUTH DAILY  Dispense: 90 tablet; Refill: 1    If protocol passes may refill for 12 months  if within 3 months of last provider visit (or a total of 15 months).             Passed - Serum Potassium in past 12 months     Lab Results   Component Value Date    Potassium 4.0 12/18/2020             Passed - Blood pressure filed in past 12 months     BP Readings from Last 1 Encounters:   06/04/21 112/60             Passed - Serum Creatinine in past 12 months     Creatinine   Date Value Ref Range Status   12/18/2020 0.68 (L) 0.70 - 1.30 mg/dL Final                glipiZIDE (GLUCOTROL XL) 10 MG 24 hr tablet [Pharmacy Med Name: GLIPIZIDE ER 10MG TABLETS] 90 tablet 1     Sig: TAKE 1 TABLET(10 MG) BY MOUTH DAILY       Oral Hypoglycemics Refill Protocol Passed - 6/17/2021  3:16 AM        Passed - Visit with PCP or prescribing provider visit in last 6 months       Last office visit with prescriber/PCP: 6/4/2021 OR same dept: 6/4/2021 Priti Charles MD OR same specialty: 6/4/2021 Priti Charles MD Last physical: Visit date not found Last MTM visit: Visit date not found         Next appt within 3 mo: Visit date not found  Next physical within 3 mo: Visit date not found  Prescriber OR PCP: Priti Charles MD  Last diagnosis associated with med order: 1. Essential hypertension  - amLODIPine (NORVASC) 10 MG tablet [Pharmacy Med Name: AMLODIPINE BESYLATE 10MG TABLETS]; TAKE 1 TABLET(10 MG) BY MOUTH DAILY AS DIRECTED  Dispense: 90 tablet; Refill: 1  - lisinopriL (PRINIVIL,ZESTRIL) 20 MG tablet [Pharmacy Med Name: LISINOPRIL 20MG TABLETS]; TAKE 1 TABLET(20 MG) BY MOUTH DAILY  Dispense: 90 tablet; Refill: 1    2. Hypertension  - amLODIPine (NORVASC) 10 MG tablet [Pharmacy Med Name: AMLODIPINE BESYLATE 10MG TABLETS]; TAKE 1 TABLET(10 MG) BY MOUTH DAILY AS DIRECTED  Dispense: 90 tablet; Refill: 1  - lisinopriL (PRINIVIL,ZESTRIL) 20 MG tablet [Pharmacy Med Name: LISINOPRIL 20MG TABLETS]; TAKE 1 TABLET(20 MG) BY MOUTH DAILY  Dispense: 90 tablet; Refill: 1    3. Type 2 diabetes mellitus without complication, without  long-term current use of insulin (H)  - glipiZIDE (GLUCOTROL XL) 10 MG 24 hr tablet [Pharmacy Med Name: GLIPIZIDE ER 10MG TABLETS]; TAKE 1 TABLET(10 MG) BY MOUTH DAILY  Dispense: 90 tablet; Refill: 1     If protocol passes may refill for 12 months if within 3 months of last provider visit (or a total of 15 months).           Passed - A1C in last 6 months     Hemoglobin A1c   Date Value Ref Range Status   06/04/2021 6.0 (H) <=5.6 % Final               Passed - Microalbumin in last year      Microalbumin, Random Urine   Date Value Ref Range Status   06/04/2021 1.66 0.00 - 1.99 mg/dL Final                  Passed - Blood pressure in last year     BP Readings from Last 1 Encounters:   06/04/21 112/60             Passed - Serum creatinine in last year     Creatinine   Date Value Ref Range Status   12/18/2020 0.68 (L) 0.70 - 1.30 mg/dL Final

## 2021-06-25 NOTE — TELEPHONE ENCOUNTER
Called and left a message for the patient to return my call.  I was reviewing his labs and noticed that his most recent hemoglobin came back very low at 7.4.  I would recommend that he have a follow-up appointment with Dr. Fox to discuss as soon as possible.  If he is feeling lightheaded, woozy having shortness of breath or chest pain he should go to the emergency room immediately.    Dr. Frost

## 2021-06-26 ENCOUNTER — HEALTH MAINTENANCE LETTER (OUTPATIENT)
Age: 71
End: 2021-06-26

## 2021-06-26 NOTE — PROGRESS NOTES
ASSESSMENT AND PLAN:     Problem List Items Addressed This Visit        Unprioritized    Hyperlipidemia    Relevant Orders    Lipid [place] FASTING    Hypertension     BP Readings from Last 3 Encounters:   06/04/21 112/60   12/18/20 124/70   03/07/20 132/76     Continue norvasc 10 mg po q day  Continue lisinopril 20 mg po daily         Type 2 diabetes mellitus without complication (H) - Primary     Well controlled  a1c the best I have ever seen at 6.0 with improved diet and elliptical workouts.     Continue metformin 2000 mg po daily  Continue glizipide 10 mg po q day.   Continue trulicity 1.5 mg sq weekly     Continue lisinopril and lipitor  Flu shot done fall 2020.   Foot exam done 12/18/2020   microalbumin ordered but he did not leave a urine today so will have to do at follow up.   Eye exam at [facility] eye 12/23/2020     Follow up in 6 months          Relevant Orders    Glycosylated Hemoglobin A1c (Completed)    Microalbumin, Random Urine    Preventative health care     Colonoscopy still not done, discussed and cologuard instead.          Microcytic anemia     Order placed.          Relevant Orders    HM1(CBC and Differential)      Other Visit Diagnoses     Special screening for malignant neoplasms, colon        Relevant Orders    Cologuard (Completed)    Immunization due        Relevant Orders    Td, Preservative Free (green label)    Screen for colon cancer                 Chief Complaint   Patient presents with     Diabetes        HPI  Regis Brooke is a 70 y.o. male is here for diabetes follow up    Social History     Tobacco Use   Smoking Status Former Smoker   Smokeless Tobacco Never Used      Current Outpatient Medications on File Prior to Visit   Medication Sig Dispense Refill     amLODIPine (NORVASC) 10 MG tablet Take 1 tablet (10 mg total) by mouth daily. As directed 90 tablet 1     ASCORBATE CALCIUM (VITAMIN C ORAL) Take by mouth.       aspirin 81 MG EC tablet Take 81 mg by mouth daily.        atorvastatin (LIPITOR) 80 MG tablet TAKE 1 TABLET(80 MG) BY MOUTH DAILY 90 tablet 3     blood glucose test strips Use 1 each As Directed 3 (three) times a day. 300 strip 3     CALCIUM ORAL Take by mouth.       cholecalciferol, vitamin D3, 400 unit cap Take by mouth.       CRANBERRY EXTRACT (CRANBERRY ORAL) Take by mouth.       dulaglutide (TRULICITY) 1.5 mg/0.5 mL PnIj Inject 1.5 mg under the skin every 7 days. 30 mL 0     ferrous sulfate 325 (65 FE) MG tablet Take 1 tablet (325 mg total) by mouth daily. 30 tablet 0     glipiZIDE (GLUCOTROL XL) 10 MG 24 hr tablet Take 1 tablet (10 mg total) by mouth daily. 90 tablet 1     lisinopriL (PRINIVIL,ZESTRIL) 20 MG tablet Take 1 tablet (20 mg total) by mouth daily. 90 tablet 1     metFORMIN (GLUCOPHAGE) 500 MG tablet TAKE 2 TABLETS(1000 MG) BY MOUTH TWICE DAILY WITH MEALS 360 tablet 0     MULTIVITAMIN ORAL Take by mouth.       tamsulosin (FLOMAX) 0.4 mg cap TAKE 1 CAPSULE(0.4 MG) BY MOUTH DAILY AFTER BREAKFAST 90 capsule 3     [DISCONTINUED] OMEGA-3/DHA/EPA/FISH OIL (FISH OIL-OMEGA-3 FATTY ACIDS) 300-1,000 mg capsule Take 2 g by mouth daily.       No current facility-administered medications on file prior to visit.       No Known Allergies      OBJECTIVE: /60   Pulse 88   Resp 14   Wt 208 lb (94.3 kg)   BMI 31.86 kg/m     Physical Exam  Constitutional:       General: He is not in acute distress.     Appearance: He is well-developed.   HENT:      Head: Normocephalic and atraumatic.   Eyes:      Conjunctiva/sclera: Conjunctivae normal.   Neck:      Musculoskeletal: Neck supple.   Cardiovascular:      Rate and Rhythm: Normal rate and regular rhythm.      Heart sounds: Normal heart sounds.   Pulmonary:      Effort: Pulmonary effort is normal.      Breath sounds: Normal breath sounds.   Abdominal:      General: Bowel sounds are normal.      Palpations: Abdomen is soft.      Comments: Abdomen is chronically shifted to the right.  He says this happened after his kidney  surgery and has never changed.   Musculoskeletal: Normal range of motion.   Skin:     General: Skin is warm and dry.   Neurological:      Mental Status: He is alert and oriented to person, place, and time.   Psychiatric:         Mood and Affect: Mood normal.         Behavior: Behavior normal.         Thought Content: Thought content normal.         Judgment: Judgment normal.          Diabetic foot exam: normal DP and PT pulses, no trophic changes or ulcerative lesions and normal sensory exam     This note was created using Dragon dictation.  Please excuse any grammatical errors.

## 2021-06-26 NOTE — PROGRESS NOTES
ASSESSMENT AND PLAN:     Problem List Items Addressed This Visit        Unprioritized    Microcytic anemia - Primary     Worsening microcytic anemia.  This is happened to him in the past and iron supplements helped.  At this time CBC today does show that the hemoglobin is stable.  He says he is tired but he does not have any heart racing, shortness of breath or chest pain or lightheadedness.  I did advise him to go straight to the emergency room if he gets any of the above symptoms.    Tests were done for iron and iron binding capacity, fecal occult blood, reticulocyte count, UA and colonoscopy to look for iron deficiency, blood loss through GI tract, he is due for colonoscopy anyway so with his anemia I would like to get a diagnostic colonoscopy and have advised him not to do the Cologuard test which was sent to him previously.  We will also get reticulocytes to see if he is hyperproductive.  And a UA to make sure he is not losing blood through his urine.         Relevant Orders    HM1(CBC and Differential)    Iron and Transferrin Iron Binding Capacity    Occult Blood, Fecal    Ambulatory referral for Colonoscopy    Reticulocytes    Urinalysis-UC if Indicated           Chief Complaint   Patient presents with     Lab Result Follow Up        HPI  Regis Brooke is a 71 y.o. male comes in to follow-up on worsening anemia down to 7.    Social History     Tobacco Use   Smoking Status Former Smoker   Smokeless Tobacco Never Used      Current Outpatient Medications on File Prior to Visit   Medication Sig Dispense Refill     amLODIPine (NORVASC) 10 MG tablet TAKE 1 TABLET(10 MG) BY MOUTH DAILY AS DIRECTED 90 tablet 3     ASCORBATE CALCIUM (VITAMIN C ORAL) Take by mouth.       aspirin 81 MG EC tablet Take 81 mg by mouth daily.       atorvastatin (LIPITOR) 80 MG tablet TAKE 1 TABLET(80 MG) BY MOUTH DAILY 90 tablet 3     blood glucose test strips Use 1 each As Directed 3 (three) times a day. 300 strip 3     CALCIUM ORAL Take  by mouth.       cholecalciferol, vitamin D3, 400 unit cap Take by mouth.       CRANBERRY EXTRACT (CRANBERRY ORAL) Take by mouth.       dulaglutide (TRULICITY) 1.5 mg/0.5 mL PnIj Inject 1.5 mg under the skin every 7 days. 30 mL 0     ferrous sulfate 325 (65 FE) MG tablet Take 1 tablet (325 mg total) by mouth daily. 30 tablet 0     glipiZIDE (GLUCOTROL XL) 10 MG 24 hr tablet TAKE 1 TABLET(10 MG) BY MOUTH DAILY 90 tablet 2     lisinopriL (PRINIVIL,ZESTRIL) 20 MG tablet TAKE 1 TABLET(20 MG) BY MOUTH DAILY 90 tablet 2     metFORMIN (GLUCOPHAGE) 500 MG tablet TAKE 2 TABLETS(1000 MG) BY MOUTH TWICE DAILY WITH MEALS 360 tablet 0     MULTIVITAMIN ORAL Take by mouth.       tamsulosin (FLOMAX) 0.4 mg cap TAKE 1 CAPSULE(0.4 MG) BY MOUTH DAILY AFTER BREAKFAST 90 capsule 3     No current facility-administered medications on file prior to visit.       No Known Allergies      Review of Systems   Constitutional: Positive for fatigue.   HENT: Negative.    Eyes: Negative.    Respiratory: Negative.  Negative for chest tightness and shortness of breath.    Cardiovascular: Negative.  Negative for chest pain and palpitations.   Gastrointestinal: Negative.         No black tarry stools, no bright red blood per rectum and no coffee-ground emesis.   Endocrine: Negative.    Genitourinary: Negative.    Musculoskeletal: Negative.    Skin: Negative.    Neurological: Negative.  Negative for dizziness, syncope, weakness and light-headedness.   Hematological: Negative.    Psychiatric/Behavioral: Negative.         OBJECTIVE: /70   Pulse 88   Resp 16    Physical Exam  Constitutional:       General: He is not in acute distress.     Appearance: He is well-developed.   HENT:      Head: Normocephalic and atraumatic.   Eyes:      Conjunctiva/sclera: Conjunctivae normal.   Neck:      Musculoskeletal: Neck supple.   Cardiovascular:      Rate and Rhythm: Normal rate and regular rhythm.      Heart sounds: Normal heart sounds.   Pulmonary:      Effort:  Pulmonary effort is normal.      Breath sounds: Normal breath sounds.   Abdominal:      General: Bowel sounds are normal.      Palpations: Abdomen is soft.   Genitourinary:     Prostate: Normal.      Comments: Rectal done to collect stool for fobt  Musculoskeletal: Normal range of motion.   Skin:     General: Skin is warm and dry.   Neurological:      Mental Status: He is alert and oriented to person, place, and time.          This note was created using Dragon dictation.  Please excuse any grammatical errors.

## 2021-06-28 ENCOUNTER — COMMUNICATION - HEALTHEAST (OUTPATIENT)
Dept: FAMILY MEDICINE | Facility: CLINIC | Age: 71
End: 2021-06-28

## 2021-06-28 DIAGNOSIS — E11.9 TYPE 2 DIABETES MELLITUS WITHOUT COMPLICATION (H): ICD-10-CM

## 2021-07-01 ENCOUNTER — RECORDS - HEALTHEAST (OUTPATIENT)
Dept: ADMINISTRATIVE | Facility: OTHER | Age: 71
End: 2021-07-01

## 2021-07-02 ENCOUNTER — RECORDS - HEALTHEAST (OUTPATIENT)
Dept: ADMINISTRATIVE | Facility: OTHER | Age: 71
End: 2021-07-02

## 2021-07-06 VITALS
WEIGHT: 208 LBS | BODY MASS INDEX: 31.86 KG/M2 | RESPIRATION RATE: 14 BRPM | SYSTOLIC BLOOD PRESSURE: 112 MMHG | DIASTOLIC BLOOD PRESSURE: 60 MMHG | HEART RATE: 88 BPM

## 2021-07-06 VITALS — HEART RATE: 88 BPM | SYSTOLIC BLOOD PRESSURE: 108 MMHG | RESPIRATION RATE: 16 BRPM | DIASTOLIC BLOOD PRESSURE: 70 MMHG

## 2021-07-07 NOTE — TELEPHONE ENCOUNTER
Refill Approved    Rx renewed per Medication Renewal Policy. Medication was last renewed on 3/31/21.    Seth Martinez, [facility] Triage/Med Refill 6/28/2021     Requested Prescriptions   Pending Prescriptions Disp Refills     metFORMIN (GLUCOPHAGE) 500 MG tablet [Pharmacy Med Name: METFORMIN 500MG TABLETS] 360 tablet 0     Sig: TAKE 2 TABLETS(1000 MG) BY MOUTH TWICE DAILY WITH MEALS       Metformin Refill Protocol Passed - 6/28/2021  3:15 AM        Passed - Blood pressure in last 12 months     BP Readings from Last 1 Encounters:   06/18/21 108/70             Passed - LFT or AST or ALT in last 12 months     Albumin   Date Value Ref Range Status   12/18/2020 3.4 (L) 3.5 - 5.0 g/dL Final     Bilirubin, Total   Date Value Ref Range Status   12/18/2020 0.3 0.0 - 1.0 mg/dL Final     Bilirubin, Direct   Date Value Ref Range Status   03/07/2020 0.2 <=0.5 mg/dL Final     Alkaline Phosphatase   Date Value Ref Range Status   12/18/2020 88 45 - 120 U/L Final     AST   Date Value Ref Range Status   12/18/2020 11 0 - 40 U/L Final     ALT   Date Value Ref Range Status   12/18/2020 9 0 - 45 U/L Final     Protein, Total   Date Value Ref Range Status   12/18/2020 6.8 6.0 - 8.0 g/dL Final                Passed - GFR or Serum Creatinine in last 6 months     GFR MDRD Non Af Amer   Date Value Ref Range Status   12/18/2020 >60 >60 mL/min/1.73m2 Final     GFR MDRD Af Amer   Date Value Ref Range Status   12/18/2020 >60 >60 mL/min/1.73m2 Final             Passed - Visit with PCP or prescribing provider visit in last 6 months or next 3 months     Last office visit with prescriber/PCP: 6/18/2021 OR same dept: 6/18/2021 Priti Charles MD OR same specialty: 6/18/2021 Priti Charles MD Last physical: Visit date not found Last MTM visit: Visit date not found         Next appt within 3 mo: Visit date not found  Next physical within 3 mo: Visit date not found  Prescriber OR PCP: Priti Charles MD  Last diagnosis associated with  med order: 1. Type 2 diabetes mellitus without complication (H)  - metFORMIN (GLUCOPHAGE) 500 MG tablet [Pharmacy Med Name: METFORMIN 500MG TABLETS]; TAKE 2 TABLETS(1000 MG) BY MOUTH TWICE DAILY WITH MEALS  Dispense: 360 tablet; Refill: 0     If protocol passes may refill for 12 months if within 3 months of last provider visit (or a total of 15 months).           Passed - A1C in last 6 months     Hemoglobin A1c   Date Value Ref Range Status   06/04/2021 6.0 (H) <=5.6 % Final               Passed - Microalbumin in last year      Microalbumin, Random Urine   Date Value Ref Range Status   06/04/2021 1.66 0.00 - 1.99 mg/dL Final

## 2021-07-09 ENCOUNTER — AMBULATORY - HEALTHEAST (OUTPATIENT)
Dept: NURSING | Facility: CLINIC | Age: 71
End: 2021-07-09

## 2021-07-14 ENCOUNTER — TRANSFERRED RECORDS (OUTPATIENT)
Dept: HEALTH INFORMATION MANAGEMENT | Facility: CLINIC | Age: 71
End: 2021-07-14

## 2021-07-14 DIAGNOSIS — Z11.59 ENCOUNTER FOR SCREENING FOR OTHER VIRAL DISEASES: ICD-10-CM

## 2021-07-16 ENCOUNTER — MYC MEDICAL ADVICE (OUTPATIENT)
Dept: FAMILY MEDICINE | Facility: CLINIC | Age: 71
End: 2021-07-16

## 2021-07-16 NOTE — TELEPHONE ENCOUNTER
I was able to reach patient by phone,.   Scheduled him for Wednesday afternoon for his preop.     Pooja Jaquez, [facility] 7/16/2021 5:02 PM   Thao DAMON

## 2021-07-21 ENCOUNTER — OFFICE VISIT (OUTPATIENT)
Dept: FAMILY MEDICINE | Facility: CLINIC | Age: 71
End: 2021-07-21
Payer: COMMERCIAL

## 2021-07-21 VITALS
BODY MASS INDEX: 30.86 KG/M2 | DIASTOLIC BLOOD PRESSURE: 68 MMHG | OXYGEN SATURATION: 99 % | TEMPERATURE: 98.6 F | HEART RATE: 99 BPM | WEIGHT: 203.6 LBS | SYSTOLIC BLOOD PRESSURE: 130 MMHG | HEIGHT: 68 IN | RESPIRATION RATE: 16 BRPM

## 2021-07-21 DIAGNOSIS — C18.2 MALIGNANT NEOPLASM OF ASCENDING COLON (H): ICD-10-CM

## 2021-07-21 DIAGNOSIS — Z01.818 PREOP GENERAL PHYSICAL EXAM: ICD-10-CM

## 2021-07-21 DIAGNOSIS — E11.9 TYPE 2 DIABETES MELLITUS WITHOUT COMPLICATION, WITHOUT LONG-TERM CURRENT USE OF INSULIN (H): ICD-10-CM

## 2021-07-21 DIAGNOSIS — Z11.52 ENCOUNTER FOR SCREENING FOR COVID-19: ICD-10-CM

## 2021-07-21 DIAGNOSIS — D50.9 MICROCYTIC ANEMIA: ICD-10-CM

## 2021-07-21 DIAGNOSIS — Z01.818 PREOPERATIVE EXAMINATION: Primary | ICD-10-CM

## 2021-07-21 PROBLEM — Z97.4 WEARS HEARING AID: Status: RESOLVED | Noted: 2017-06-20 | Resolved: 2021-07-21

## 2021-07-21 LAB
ALBUMIN SERPL-MCNC: 3.2 G/DL (ref 3.5–5)
ALP SERPL-CCNC: 72 U/L (ref 45–120)
ALT SERPL W P-5'-P-CCNC: <9 U/L (ref 0–45)
ANION GAP SERPL CALCULATED.3IONS-SCNC: 10 MMOL/L (ref 5–18)
AST SERPL W P-5'-P-CCNC: 11 U/L (ref 0–40)
ATRIAL RATE - MUSE: 83 BPM
BILIRUB SERPL-MCNC: 0.3 MG/DL (ref 0–1)
BUN SERPL-MCNC: 20 MG/DL (ref 8–28)
CALCIUM SERPL-MCNC: 9 MG/DL (ref 8.5–10.5)
CEA SERPL-MCNC: 2.7 NG/ML (ref 0–3)
CHLORIDE BLD-SCNC: 106 MMOL/L (ref 98–107)
CO2 SERPL-SCNC: 23 MMOL/L (ref 22–31)
CREAT SERPL-MCNC: 0.7 MG/DL (ref 0.7–1.3)
DIASTOLIC BLOOD PRESSURE - MUSE: NORMAL MMHG
ERYTHROCYTE [DISTWIDTH] IN BLOOD BY AUTOMATED COUNT: 17.3 % (ref 10–15)
GFR SERPL CREATININE-BSD FRML MDRD: >90 ML/MIN/1.73M2
GLUCOSE BLD-MCNC: 154 MG/DL (ref 70–125)
HCT VFR BLD AUTO: 24.6 % (ref 40–53)
HGB BLD-MCNC: 7.3 G/DL (ref 13.3–17.7)
INTERPRETATION ECG - MUSE: NORMAL
MCH RBC QN AUTO: 21 PG (ref 26.5–33)
MCHC RBC AUTO-ENTMCNC: 29.7 G/DL (ref 31.5–36.5)
MCV RBC AUTO: 71 FL (ref 78–100)
P AXIS - MUSE: 12 DEGREES
PLATELET # BLD AUTO: 341 10E3/UL (ref 150–450)
POTASSIUM BLD-SCNC: 4.5 MMOL/L (ref 3.5–5)
PR INTERVAL - MUSE: 162 MS
PROT SERPL-MCNC: 6.5 G/DL (ref 6–8)
QRS DURATION - MUSE: 92 MS
QT - MUSE: 402 MS
QTC - MUSE: 472 MS
R AXIS - MUSE: -13 DEGREES
RBC # BLD AUTO: 3.47 10E6/UL (ref 4.4–5.9)
SODIUM SERPL-SCNC: 139 MMOL/L (ref 136–145)
SYSTOLIC BLOOD PRESSURE - MUSE: NORMAL MMHG
T AXIS - MUSE: 20 DEGREES
VENTRICULAR RATE- MUSE: 83 BPM
WBC # BLD AUTO: 6.5 10E3/UL (ref 4–11)

## 2021-07-21 PROCEDURE — 85027 COMPLETE CBC AUTOMATED: CPT | Performed by: FAMILY MEDICINE

## 2021-07-21 PROCEDURE — 82378 CARCINOEMBRYONIC ANTIGEN: CPT | Performed by: FAMILY MEDICINE

## 2021-07-21 PROCEDURE — 93010 ELECTROCARDIOGRAM REPORT: CPT | Performed by: INTERNAL MEDICINE

## 2021-07-21 PROCEDURE — 36415 COLL VENOUS BLD VENIPUNCTURE: CPT | Performed by: FAMILY MEDICINE

## 2021-07-21 PROCEDURE — 93005 ELECTROCARDIOGRAM TRACING: CPT | Performed by: FAMILY MEDICINE

## 2021-07-21 PROCEDURE — 99214 OFFICE O/P EST MOD 30 MIN: CPT | Performed by: FAMILY MEDICINE

## 2021-07-21 PROCEDURE — 80053 COMPREHEN METABOLIC PANEL: CPT | Performed by: FAMILY MEDICINE

## 2021-07-21 ASSESSMENT — ENCOUNTER SYMPTOMS
FATIGUE: 1
SORE THROAT: 0
COUGH: 0
BACK PAIN: 0
VOMITING: 0
PALPITATIONS: 0
HEMATURIA: 0
ABDOMINAL PAIN: 0
COLOR CHANGE: 0
CHILLS: 0
FEVER: 0
ARTHRALGIAS: 0
SHORTNESS OF BREATH: 0
SEIZURES: 0
DYSURIA: 0
EYE PAIN: 0

## 2021-07-21 ASSESSMENT — MIFFLIN-ST. JEOR: SCORE: 1654.05

## 2021-07-21 NOTE — ASSESSMENT & PLAN NOTE
Well controlled  a1c the best I have ever seen at 6.0 with improved diet and elliptical workouts.     Continue metformin 2000 mg po daily  Continue glizipide 10 mg po q day.   Continue trulicity 1.5 mg sq weekly     Continue lisinopril and lipitor  Flu shot done fall 2020.   Foot exam done 12/18/2020   microalbumin ordered but he did not leave a urine today so will have to do at follow up.   Eye exam at [facility] eye 12/23/2020     Follow up in 6 months

## 2021-07-21 NOTE — ASSESSMENT & PLAN NOTE
preop today.   Surgery/Procedure: Colon cancer surgery  Surgery Location: [facility]  Surgeon: Dr. Ana Dorsey  Surgery Date: 7/30/21  Time of Surgery: 1pm    Surgeon note reviewed and they do want a CEA which I added onto today's labs

## 2021-07-21 NOTE — PROGRESS NOTES
[place] [id number] [facility] [address]  [facility] [address]  Phone: [phone]  Fax: [phone]  Primary Provider: Priti Charles    PREOPERATIVE EVALUATION:  Today's date: 7/21/2021    Regis Brooke is a 71 year old male who presents for a preoperative evaluation.    Surgical Information:  Surgery/Procedure: Colon cancer surgery  Surgery Location: [facility]  Surgeon: Dr. Ana Dorsey  Surgery Date: 7/30/21  Time of Surgery: 1pm  Where patient plans to recover: At home with family  Fax number for surgical facility: Note does not need to be faxed, will be available electronically in Epic.    Type of Anesthesia Anticipated: General    Assessment & Plan     The proposed surgical procedure is considered INTERMEDIATE risk.    Problem List Items Addressed This Visit        Digestive    Malignant neoplasm of ascending colon (H)     preop today.   Surgery/Procedure: Colon cancer surgery  Surgery Location: [facility]  Surgeon: Dr. Ana Dorsey  Surgery Date: 7/30/21  Time of Surgery: 1pm    Surgeon note reviewed and they do want a CEA which I added onto today's labs          Relevant Orders    CEA       Endocrine    Type 2 diabetes mellitus without complication (H)     Well controlled  a1c the best I have ever seen at 6.0 with improved diet and elliptical workouts.     Continue metformin 2000 mg po daily  Continue glizipide 10 mg po q day.   Continue trulicity 1.5 mg sq weekly     Continue lisinopril and lipitor  Flu shot done fall 2020.   Foot exam done 12/18/2020   microalbumin ordered but he did not leave a urine today so will have to do at follow up.   Eye exam at [facility] 12/23/2020     Follow up in 6 months            Other Visit Diagnoses     Preoperative examination    -  Primary    Relevant Orders    CBC with platelets (Completed)    Comprehensive metabolic panel (BMP + Alb, Alk Phos, ALT, AST, Total. Bili, TP)    EKG 12-lead, tracing only (Completed)     Preop general physical exam        Encounter for screening for COVID-19        Relevant Orders    Asymptomatic COVID-19 Virus (Coronavirus) by PCR Nasopharyngeal               Risks and Recommendations:  The patient has the following additional risks and recommendations for perioperative complications:  Diabetes:  - Patient is not on insulin therapy: regular NPO guidelines can be followed.   Anemia/Bleeding/Clotting:    - Anemia - I have messaged Dr. Dorsey and expect her to call me tomorrow to discuss pre op blood transfusion to tank Regis up.     Medication Instructions:  take all medications except while NPO. resume meds once taking PO again    RECOMMENDATION:  Await discussion with surgeon Dr. Dorsey regarding hgb 7.3 - I think Regis would benefit from blood transfusion prior to surgery to take him up.  However I would like to talk with Dr. Dorsey before ordering the transfusion.    Review of prior external note(s) from - Outside records from [facility] AND COLORECTAL SURGEON    Discussion of management or test interpretation with external physician/other qualified healthcare professional/appropriate source -  Discuss risk/ benefit of blood transfusion prior to colon resection with Dr. Dorsey, colorectal surgeon.    Diagnosis or treatment significantly limited by social determinants of health -the patient possibly has some underlying cognitive deficit that has never been clearly diagnosed as his wife cares for him and generally helps him make medical decisions/appointments for doctor visits etc.    I spent a total of 38 minutes on the day of the visit.   Time spent doing chart review, history and exam, documentation and further activities per the note        Subjective     HPI related to upcoming procedure: Patient noted to have anemia-colonoscopy was done, large mass was seen, he was then sent to colorectal surgery and now it is planned to have a colon resection.  He is here for preoperative exam today.    Preop  Questions 7/21/2021   1. Have you ever had a heart attack or stroke? No   2. Have you ever had surgery on your heart or blood vessels, such as a stent placement, a coronary artery bypass, or surgery on an artery in your head, neck, heart, or legs? No   3. Do you have chest pain with activity? No   4. Do you have a history of  heart failure? No   5. Do you currently have a cold, bronchitis or symptoms of other infection? No   6. Do you have a cough, shortness of breath, or wheezing? No   7. Do you or anyone in your family have previous history of blood clots? No   8. Do you or does anyone in your family have a serious bleeding problem such as prolonged bleeding following surgeries or cuts? No   9. Have you ever had problems with anemia or been told to take iron pills? YES - which was the reason for colonoscopy and finding the colon cancer.    10. Have you had any abnormal blood loss such as black, tarry or bloody stools? No   11. Have you ever had a blood transfusion? No   12. Are you willing to have a blood transfusion if it is medically needed before, during, or after your surgery? Yes   13. Have you or any of your relatives ever had problems with anesthesia? No   14. Do you have sleep apnea, excessive snoring or daytime drowsiness? No   15. Do you have any artifical heart valves or other implanted medical devices like a pacemaker, defibrillator, or continuous glucose monitor? No   16. Do you have artificial joints? No   17. Are you allergic to latex? No       Health Care Directive:  Patient does not have a Health Care Directive or Living Will: Discussed advance care planning with patient; information given to patient to review.    Status of Chronic Conditions:  ANEMIA - Patient has a recent history of moderate-severe anemia, which has been symptomatic. Work up to date has revealed some fatigue and hgb 7.3. Treatment will be discussed with colorectal surgeon, Dr. Dorsey as soon as we are able to connect.      DIABETES - Patient has a longstanding history of DiabetesType Type II . Patient is being treated with oral agents and denies significant side effects. Control has been good.     Review of Systems   Constitutional: Positive for fatigue. Negative for chills and fever.   HENT: Negative for ear pain and sore throat.    Eyes: Negative for pain and visual disturbance.   Respiratory: Negative for cough and shortness of breath.    Cardiovascular: Negative for chest pain and palpitations.   Gastrointestinal: Negative for abdominal pain and vomiting.   Genitourinary: Negative for dysuria and hematuria.   Musculoskeletal: Negative for arthralgias and back pain.   Skin: Negative for color change and rash.   Neurological: Negative for seizures and syncope.   All other systems reviewed and are negative.        Patient Active Problem List    Diagnosis Date Noted     Malignant neoplasm of ascending colon (H) 07/21/2021     Priority: Medium     Hypertension      Priority: Medium     Lisinopril and norvasc.  Replacement Utility updated for latest IMO load         Type 2 diabetes mellitus without complication (H)      Priority: Medium     Created by Conversion         Hyperlipidemia      Priority: Medium     Created by Conversion         Vitamin D Deficiency      Priority: Medium     Created by Conversion  Replacement Utility updated for latest IMO load         Cognitive deficits 11/28/2019     Priority: Medium     Microcytic anemia 11/28/2019     Priority: Medium     Pain in both hands 10/25/2017     Priority: Medium     Wears hearing aid - bilateral 06/20/2017     Priority: Medium     Hx of renal cell cancer      Priority: Medium     Created by Conversion  [facility] Annotation: May 29 2011  4:34PM - Negar Lawler: RIGHT KIDNEY   S/P   PARTIAL NEPHRECTOMY, 11-2-2011  Replacement Utility updated for latest IMO load         Preventative health care 05/04/2016     Priority: Medium     Nephrolithiasis Of The Left Kidney       Priority: Medium     Created by Conversion          Past Medical History:   Diagnosis Date     Calculus of kidney     Created by Conversion      Essential hypertension     Lisinopril and norvasc.  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)     Created by Conversion [facility] Annotation: May 29 2011  4:34PM - Negar Lawler: RIGHT KIDNEY S/P  PARTIAL NEPHRECTOMY, 11-2-2011  Replacement Utility updated for latest IMO load     Type 2 diabetes mellitus without complication (H)     Created by Conversion      Vitamin D deficiency     Created by Conversion  Replacement Utility updated for latest IMO load     Past Surgical History:   Procedure Laterality Date     C REMV KIDNEY,W/RIB RESECTION      Description: Nephrectomy;  Recorded: 09/12/2011;  Comments: RIGHT PARTIAL NEPHRECTOMY, 11/2010     HC REMOVAL GALLBLADDER      Description: Cholecystectomy;  Recorded: 05/05/2008;     HC REPAIR UMBILICAL RITU,<6Y/O,REDUC      Description: Umbilical Hernia Repair;  Recorded: 05/05/2008;     IR LUMBAR EPIDURAL STEROID INJECTION  11/6/2008     Current Outpatient Medications   Medication Sig Dispense Refill     amLODIPine (NORVASC) 10 MG tablet [AMLODIPINE (NORVASC) 10 MG TABLET] Take 1 tablet (10 mg total) by mouth daily. As directed 90 tablet 1     ASCORBATE CALCIUM (VITAMIN C ORAL) [ASCORBATE CALCIUM (VITAMIN C ORAL)] Take by mouth.       aspirin 81 MG EC tablet [ASPIRIN 81 MG EC TABLET] Take 81 mg by mouth daily.       atorvastatin (LIPITOR) 80 MG tablet [ATORVASTATIN (LIPITOR) 80 MG TABLET] TAKE 1 TABLET(80 MG) BY MOUTH DAILY 90 tablet 3     blood glucose test strips [BLOOD GLUCOSE TEST STRIPS] Use 1 each As Directed 3 (three) times a day. 300 strip 3     CALCIUM ORAL [CALCIUM ORAL] Take by mouth.       cholecalciferol, vitamin D3, 400 unit cap Take 5,000 Units by mouth daily     "    CRANBERRY EXTRACT (CRANBERRY ORAL) [CRANBERRY EXTRACT (CRANBERRY ORAL)] Take by mouth.       dulaglutide (TRULICITY) 1.5 mg/0.5 mL PnIj [DULAGLUTIDE (TRULICITY) 1.5 MG/0.5 ML PNIJ] Inject 1.5 mg under the skin every 7 days. 30 mL 0     ferrous sulfate 325 (65 FE) MG tablet [FERROUS SULFATE 325 (65 FE) MG TABLET] Take 1 tablet (325 mg total) by mouth daily. 30 tablet 0     glipiZIDE (GLUCOTROL XL) 10 MG 24 hr tablet [GLIPIZIDE (GLUCOTROL XL) 10 MG 24 HR TABLET] Take 1 tablet (10 mg total) by mouth daily. 90 tablet 1     lisinopriL (PRINIVIL,ZESTRIL) 20 MG tablet [LISINOPRIL (PRINIVIL,ZESTRIL) 20 MG TABLET] Take 1 tablet (20 mg total) by mouth daily. 90 tablet 1     metFORMIN (GLUCOPHAGE) 500 MG tablet [METFORMIN (GLUCOPHAGE) 500 MG TABLET] TAKE 2 TABLETS(1000 MG) BY MOUTH TWICE DAILY WITH MEALS 360 tablet 0     MULTIVITAMIN ORAL [MULTIVITAMIN ORAL] Take by mouth.       tamsulosin (FLOMAX) 0.4 mg cap [TAMSULOSIN (FLOMAX) 0.4 MG CAP] TAKE 1 CAPSULE(0.4 MG) BY MOUTH DAILY AFTER BREAKFAST 90 capsule 3       No Known Allergies     Social History     Tobacco Use     Smoking status: Former Smoker     Smokeless tobacco: Never Used   Substance Use Topics     Alcohol use: Yes     Alcohol/week: 1.0 standard drinks     Types: 1 Glasses of wine per week     History reviewed. No pertinent family history.  History   Drug Use Unknown         Objective     /68 (BP Location: Left arm, Patient Position: Sitting, Cuff Size: Adult Regular)   Pulse 99   Temp 98.6  F (37  C) (Oral)   Resp 16   Ht 1.721 m (5' 7.75\")   Wt 92.4 kg (203 lb 9.6 oz)   SpO2 99%   BMI 31.19 kg/m    Physical Exam  Constitutional:       Appearance: Normal appearance.   HENT:      Head: Normocephalic and atraumatic.      Right Ear: Tympanic membrane, ear canal and external ear normal.      Left Ear: Tympanic membrane, ear canal and external ear normal.      Ears:      Comments: BILATERAL HEARING AIDS NOTED     Nose: Nose normal.      Mouth/Throat:    "   Mouth: Mucous membranes are moist.      Pharynx: Oropharynx is clear.   Eyes:      Extraocular Movements: Extraocular movements intact.      Conjunctiva/sclera: Conjunctivae normal.   Cardiovascular:      Rate and Rhythm: Normal rate and regular rhythm.      Heart sounds: Normal heart sounds.   Pulmonary:      Effort: Pulmonary effort is normal.      Breath sounds: Normal breath sounds.   Abdominal:      General: Bowel sounds are normal.      Palpations: Abdomen is soft.   Musculoskeletal:         General: Normal range of motion.      Cervical back: Normal range of motion and neck supple.   Neurological:      General: No focal deficit present.      Mental Status: He is alert.           Recent Labs   Lab Test 06/18/21  1441 06/04/21  1314 06/04/21  1219 12/18/20  1421 12/18/20  1357 03/07/20  1146 11/27/19  1445   HGB 7.6* 7.4*  --  9.9*  --  9.0*   < >   * 449*  --  425  --  377   < >   NA  --   --   --  139  --  136  --    POTASSIUM  --   --   --  4.0  --  4.0  --    CR  --   --   --  0.68*  --  0.82  --    A1C  --   --  6.0*  --  6.5*  --   --     < > = values in this interval not displayed.        Diagnostics:  Lab Results   Component Value Date    WBC 6.5 07/21/2021     Lab Results   Component Value Date    RBC 3.47 07/21/2021     Lab Results   Component Value Date    HGB 7.3 07/21/2021     Lab Results   Component Value Date    HCT 24.6 07/21/2021     No components found for: MCT  Lab Results   Component Value Date    MCV 71 07/21/2021     Lab Results   Component Value Date    MCH 21.0 07/21/2021     Lab Results   Component Value Date    MCHC 29.7 07/21/2021     Lab Results   Component Value Date    RDW 17.3 07/21/2021     Lab Results   Component Value Date     07/21/2021        ekg - Sinus rhythm with occasional Premature ventricular complexes     Revised Cardiac Risk Index (RCRI):  The patient has the following serious cardiovascular risks for perioperative complications:   - No serious cardiac  risks = 0 points     RCRI Interpretation: 0 points: Class I (very low risk - 0.4% complication rate)       Signed Electronically by: Priti Charles MD  Copy of this evaluation report is provided to requesting physician.

## 2021-07-21 NOTE — Clinical Note
Hi Dr. Dorsey,   I am just wrapping up preop on Regis prior to his colon resection.  His hgb today is 7.3. I am thinking he would benefit from blood transfusion. Would you agree?  I can arrange if you want to proceed with this to tank him up before surgery.     Priti

## 2021-07-21 NOTE — PATIENT INSTRUCTIONS

## 2021-07-21 NOTE — H&P (VIEW-ONLY)
[place] [id number] [facility] [address]  [facility] [address]  Phone: [phone]  Fax: [phone]  Primary Provider: Priti Charles    PREOPERATIVE EVALUATION:  Today's date: 7/21/2021    Regis Brooke is a 71 year old male who presents for a preoperative evaluation.    Surgical Information:  Surgery/Procedure: Colon cancer surgery  Surgery Location: [facility]  Surgeon: Dr. Ana Dorsey  Surgery Date: 7/30/21  Time of Surgery: 1pm  Where patient plans to recover: At home with family  Fax number for surgical facility: Note does not need to be faxed, will be available electronically in Epic.    Type of Anesthesia Anticipated: General    Assessment & Plan     The proposed surgical procedure is considered INTERMEDIATE risk.    Problem List Items Addressed This Visit        Digestive    Malignant neoplasm of ascending colon (H)     preop today.   Surgery/Procedure: Colon cancer surgery  Surgery Location: [facility]  Surgeon: Dr. Ana Dorsey  Surgery Date: 7/30/21  Time of Surgery: 1pm    Surgeon note reviewed and they do want a CEA which I added onto today's labs          Relevant Orders    CEA       Endocrine    Type 2 diabetes mellitus without complication (H)     Well controlled  a1c the best I have ever seen at 6.0 with improved diet and elliptical workouts.     Continue metformin 2000 mg po daily  Continue glizipide 10 mg po q day.   Continue trulicity 1.5 mg sq weekly     Continue lisinopril and lipitor  Flu shot done fall 2020.   Foot exam done 12/18/2020   microalbumin ordered but he did not leave a urine today so will have to do at follow up.   Eye exam at [facility] 12/23/2020     Follow up in 6 months            Other Visit Diagnoses     Preoperative examination    -  Primary    Relevant Orders    CBC with platelets (Completed)    Comprehensive metabolic panel (BMP + Alb, Alk Phos, ALT, AST, Total. Bili, TP)    EKG 12-lead, tracing only (Completed)     Preop general physical exam        Encounter for screening for COVID-19        Relevant Orders    Asymptomatic COVID-19 Virus (Coronavirus) by PCR Nasopharyngeal               Risks and Recommendations:  The patient has the following additional risks and recommendations for perioperative complications:  Diabetes:  - Patient is not on insulin therapy: regular NPO guidelines can be followed.   Anemia/Bleeding/Clotting:    - Anemia - I have messaged Dr. Dorsey and expect her to call me tomorrow to discuss pre op blood transfusion to tank Regis up.     Medication Instructions:  take all medications except while NPO. resume meds once taking PO again    RECOMMENDATION:  Await discussion with surgeon Dr. Dorsey regarding hgb 7.3 - I think Regis would benefit from blood transfusion prior to surgery to take him up.  However I would like to talk with Dr. Dorsey before ordering the transfusion.    Review of prior external note(s) from - Outside records from [facility] AND COLORECTAL SURGEON    Discussion of management or test interpretation with external physician/other qualified healthcare professional/appropriate source -  Discuss risk/ benefit of blood transfusion prior to colon resection with Dr. Dorsey, colorectal surgeon.    Diagnosis or treatment significantly limited by social determinants of health -the patient possibly has some underlying cognitive deficit that has never been clearly diagnosed as his wife cares for him and generally helps him make medical decisions/appointments for doctor visits etc.    I spent a total of 38 minutes on the day of the visit.   Time spent doing chart review, history and exam, documentation and further activities per the note        Subjective     HPI related to upcoming procedure: Patient noted to have anemia-colonoscopy was done, large mass was seen, he was then sent to colorectal surgery and now it is planned to have a colon resection.  He is here for preoperative exam today.    Preop  Questions 7/21/2021   1. Have you ever had a heart attack or stroke? No   2. Have you ever had surgery on your heart or blood vessels, such as a stent placement, a coronary artery bypass, or surgery on an artery in your head, neck, heart, or legs? No   3. Do you have chest pain with activity? No   4. Do you have a history of  heart failure? No   5. Do you currently have a cold, bronchitis or symptoms of other infection? No   6. Do you have a cough, shortness of breath, or wheezing? No   7. Do you or anyone in your family have previous history of blood clots? No   8. Do you or does anyone in your family have a serious bleeding problem such as prolonged bleeding following surgeries or cuts? No   9. Have you ever had problems with anemia or been told to take iron pills? YES - which was the reason for colonoscopy and finding the colon cancer.    10. Have you had any abnormal blood loss such as black, tarry or bloody stools? No   11. Have you ever had a blood transfusion? No   12. Are you willing to have a blood transfusion if it is medically needed before, during, or after your surgery? Yes   13. Have you or any of your relatives ever had problems with anesthesia? No   14. Do you have sleep apnea, excessive snoring or daytime drowsiness? No   15. Do you have any artifical heart valves or other implanted medical devices like a pacemaker, defibrillator, or continuous glucose monitor? No   16. Do you have artificial joints? No   17. Are you allergic to latex? No       Health Care Directive:  Patient does not have a Health Care Directive or Living Will: Discussed advance care planning with patient; information given to patient to review.    Status of Chronic Conditions:  ANEMIA - Patient has a recent history of moderate-severe anemia, which has been symptomatic. Work up to date has revealed some fatigue and hgb 7.3. Treatment will be discussed with colorectal surgeon, Dr. Dorsey as soon as we are able to connect.      DIABETES - Patient has a longstanding history of DiabetesType Type II . Patient is being treated with oral agents and denies significant side effects. Control has been good.     Review of Systems   Constitutional: Positive for fatigue. Negative for chills and fever.   HENT: Negative for ear pain and sore throat.    Eyes: Negative for pain and visual disturbance.   Respiratory: Negative for cough and shortness of breath.    Cardiovascular: Negative for chest pain and palpitations.   Gastrointestinal: Negative for abdominal pain and vomiting.   Genitourinary: Negative for dysuria and hematuria.   Musculoskeletal: Negative for arthralgias and back pain.   Skin: Negative for color change and rash.   Neurological: Negative for seizures and syncope.   All other systems reviewed and are negative.        Patient Active Problem List    Diagnosis Date Noted     Malignant neoplasm of ascending colon (H) 07/21/2021     Priority: Medium     Hypertension      Priority: Medium     Lisinopril and norvasc.  Replacement Utility updated for latest IMO load         Type 2 diabetes mellitus without complication (H)      Priority: Medium     Created by Conversion         Hyperlipidemia      Priority: Medium     Created by Conversion         Vitamin D Deficiency      Priority: Medium     Created by Conversion  Replacement Utility updated for latest IMO load         Cognitive deficits 11/28/2019     Priority: Medium     Microcytic anemia 11/28/2019     Priority: Medium     Pain in both hands 10/25/2017     Priority: Medium     Wears hearing aid - bilateral 06/20/2017     Priority: Medium     Hx of renal cell cancer      Priority: Medium     Created by Conversion  [facility] Annotation: May 29 2011  4:34PM - Negar Lawler: RIGHT KIDNEY   S/P   PARTIAL NEPHRECTOMY, 11-2-2011  Replacement Utility updated for latest IMO load         Preventative health care 05/04/2016     Priority: Medium     Nephrolithiasis Of The Left Kidney       Priority: Medium     Created by Conversion          Past Medical History:   Diagnosis Date     Calculus of kidney     Created by Conversion      Essential hypertension     Lisinopril and norvasc.  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)     Created by Conversion [facility] Annotation: May 29 2011  4:34PM - Negar Lawler: RIGHT KIDNEY S/P  PARTIAL NEPHRECTOMY, 11-2-2011  Replacement Utility updated for latest IMO load     Type 2 diabetes mellitus without complication (H)     Created by Conversion      Vitamin D deficiency     Created by Conversion  Replacement Utility updated for latest IMO load     Past Surgical History:   Procedure Laterality Date     C REMV KIDNEY,W/RIB RESECTION      Description: Nephrectomy;  Recorded: 09/12/2011;  Comments: RIGHT PARTIAL NEPHRECTOMY, 11/2010     HC REMOVAL GALLBLADDER      Description: Cholecystectomy;  Recorded: 05/05/2008;     HC REPAIR UMBILICAL RITU,<6Y/O,REDUC      Description: Umbilical Hernia Repair;  Recorded: 05/05/2008;     IR LUMBAR EPIDURAL STEROID INJECTION  11/6/2008     Current Outpatient Medications   Medication Sig Dispense Refill     amLODIPine (NORVASC) 10 MG tablet [AMLODIPINE (NORVASC) 10 MG TABLET] Take 1 tablet (10 mg total) by mouth daily. As directed 90 tablet 1     ASCORBATE CALCIUM (VITAMIN C ORAL) [ASCORBATE CALCIUM (VITAMIN C ORAL)] Take by mouth.       aspirin 81 MG EC tablet [ASPIRIN 81 MG EC TABLET] Take 81 mg by mouth daily.       atorvastatin (LIPITOR) 80 MG tablet [ATORVASTATIN (LIPITOR) 80 MG TABLET] TAKE 1 TABLET(80 MG) BY MOUTH DAILY 90 tablet 3     blood glucose test strips [BLOOD GLUCOSE TEST STRIPS] Use 1 each As Directed 3 (three) times a day. 300 strip 3     CALCIUM ORAL [CALCIUM ORAL] Take by mouth.       cholecalciferol, vitamin D3, 400 unit cap Take 5,000 Units by mouth daily     "    CRANBERRY EXTRACT (CRANBERRY ORAL) [CRANBERRY EXTRACT (CRANBERRY ORAL)] Take by mouth.       dulaglutide (TRULICITY) 1.5 mg/0.5 mL PnIj [DULAGLUTIDE (TRULICITY) 1.5 MG/0.5 ML PNIJ] Inject 1.5 mg under the skin every 7 days. 30 mL 0     ferrous sulfate 325 (65 FE) MG tablet [FERROUS SULFATE 325 (65 FE) MG TABLET] Take 1 tablet (325 mg total) by mouth daily. 30 tablet 0     glipiZIDE (GLUCOTROL XL) 10 MG 24 hr tablet [GLIPIZIDE (GLUCOTROL XL) 10 MG 24 HR TABLET] Take 1 tablet (10 mg total) by mouth daily. 90 tablet 1     lisinopriL (PRINIVIL,ZESTRIL) 20 MG tablet [LISINOPRIL (PRINIVIL,ZESTRIL) 20 MG TABLET] Take 1 tablet (20 mg total) by mouth daily. 90 tablet 1     metFORMIN (GLUCOPHAGE) 500 MG tablet [METFORMIN (GLUCOPHAGE) 500 MG TABLET] TAKE 2 TABLETS(1000 MG) BY MOUTH TWICE DAILY WITH MEALS 360 tablet 0     MULTIVITAMIN ORAL [MULTIVITAMIN ORAL] Take by mouth.       tamsulosin (FLOMAX) 0.4 mg cap [TAMSULOSIN (FLOMAX) 0.4 MG CAP] TAKE 1 CAPSULE(0.4 MG) BY MOUTH DAILY AFTER BREAKFAST 90 capsule 3       No Known Allergies     Social History     Tobacco Use     Smoking status: Former Smoker     Smokeless tobacco: Never Used   Substance Use Topics     Alcohol use: Yes     Alcohol/week: 1.0 standard drinks     Types: 1 Glasses of wine per week     History reviewed. No pertinent family history.  History   Drug Use Unknown         Objective     /68 (BP Location: Left arm, Patient Position: Sitting, Cuff Size: Adult Regular)   Pulse 99   Temp 98.6  F (37  C) (Oral)   Resp 16   Ht 1.721 m (5' 7.75\")   Wt 92.4 kg (203 lb 9.6 oz)   SpO2 99%   BMI 31.19 kg/m    Physical Exam  Constitutional:       Appearance: Normal appearance.   HENT:      Head: Normocephalic and atraumatic.      Right Ear: Tympanic membrane, ear canal and external ear normal.      Left Ear: Tympanic membrane, ear canal and external ear normal.      Ears:      Comments: BILATERAL HEARING AIDS NOTED     Nose: Nose normal.      Mouth/Throat:    "   Mouth: Mucous membranes are moist.      Pharynx: Oropharynx is clear.   Eyes:      Extraocular Movements: Extraocular movements intact.      Conjunctiva/sclera: Conjunctivae normal.   Cardiovascular:      Rate and Rhythm: Normal rate and regular rhythm.      Heart sounds: Normal heart sounds.   Pulmonary:      Effort: Pulmonary effort is normal.      Breath sounds: Normal breath sounds.   Abdominal:      General: Bowel sounds are normal.      Palpations: Abdomen is soft.   Musculoskeletal:         General: Normal range of motion.      Cervical back: Normal range of motion and neck supple.   Neurological:      General: No focal deficit present.      Mental Status: He is alert.           Recent Labs   Lab Test 06/18/21  1441 06/04/21  1314 06/04/21  1219 12/18/20  1421 12/18/20  1357 03/07/20  1146 11/27/19  1445   HGB 7.6* 7.4*  --  9.9*  --  9.0*   < >   * 449*  --  425  --  377   < >   NA  --   --   --  139  --  136  --    POTASSIUM  --   --   --  4.0  --  4.0  --    CR  --   --   --  0.68*  --  0.82  --    A1C  --   --  6.0*  --  6.5*  --   --     < > = values in this interval not displayed.        Diagnostics:  Lab Results   Component Value Date    WBC 6.5 07/21/2021     Lab Results   Component Value Date    RBC 3.47 07/21/2021     Lab Results   Component Value Date    HGB 7.3 07/21/2021     Lab Results   Component Value Date    HCT 24.6 07/21/2021     No components found for: MCT  Lab Results   Component Value Date    MCV 71 07/21/2021     Lab Results   Component Value Date    MCH 21.0 07/21/2021     Lab Results   Component Value Date    MCHC 29.7 07/21/2021     Lab Results   Component Value Date    RDW 17.3 07/21/2021     Lab Results   Component Value Date     07/21/2021        ekg - Sinus rhythm with occasional Premature ventricular complexes     Revised Cardiac Risk Index (RCRI):  The patient has the following serious cardiovascular risks for perioperative complications:   - No serious cardiac  risks = 0 points     RCRI Interpretation: 0 points: Class I (very low risk - 0.4% complication rate)       Signed Electronically by: Priti Charles MD  Copy of this evaluation report is provided to requesting physician.

## 2021-07-22 RX ORDER — HEPARIN SODIUM,PORCINE 10 UNIT/ML
5 VIAL (ML) INTRAVENOUS
Status: CANCELLED | OUTPATIENT
Start: 2021-07-22

## 2021-07-22 RX ORDER — HEPARIN SODIUM (PORCINE) LOCK FLUSH IV SOLN 100 UNIT/ML 100 UNIT/ML
5 SOLUTION INTRAVENOUS
Status: CANCELLED | OUTPATIENT
Start: 2021-07-22

## 2021-07-23 ENCOUNTER — TRANSFERRED RECORDS (OUTPATIENT)
Dept: HEALTH INFORMATION MANAGEMENT | Facility: CLINIC | Age: 71
End: 2021-07-23

## 2021-07-26 ENCOUNTER — LAB (OUTPATIENT)
Dept: LAB | Facility: CLINIC | Age: 71
End: 2021-07-26
Payer: COMMERCIAL

## 2021-07-26 DIAGNOSIS — Z11.59 ENCOUNTER FOR SCREENING FOR OTHER VIRAL DISEASES: ICD-10-CM

## 2021-07-26 DIAGNOSIS — Z11.52 ENCOUNTER FOR SCREENING FOR COVID-19: ICD-10-CM

## 2021-07-26 PROCEDURE — U0005 INFEC AGEN DETEC AMPLI PROBE: HCPCS

## 2021-07-26 PROCEDURE — U0003 INFECTIOUS AGENT DETECTION BY NUCLEIC ACID (DNA OR RNA); SEVERE ACUTE RESPIRATORY SYNDROME CORONAVIRUS 2 (SARS-COV-2) (CORONAVIRUS DISEASE [COVID-19]), AMPLIFIED PROBE TECHNIQUE, MAKING USE OF HIGH THROUGHPUT TECHNOLOGIES AS DESCRIBED BY CMS-2020-01-R: HCPCS

## 2021-07-27 LAB — SARS-COV-2 RNA RESP QL NAA+PROBE: NEGATIVE

## 2021-07-28 ENCOUNTER — TELEPHONE (OUTPATIENT)
Dept: FAMILY MEDICINE | Facility: CLINIC | Age: 71
End: 2021-07-28

## 2021-07-28 NOTE — TELEPHONE ENCOUNTER
"Left message for patient to call back.      He is scheduled to have a blood transfusion tomorrow before his surgery.   was required to sign a \"verbal consent\" on his behalf in order to make this happen.  Does he have any questions that she can answer prior to tomorrow?    That is part of this form - that any questions have been answered.   If he does not, we do not need anything else.  He is good to go for his transfusion tomorrow.     Pooja Jaquez CMA 7/28/2021 3:36 PM   Vance CSS    "

## 2021-07-28 NOTE — TELEPHONE ENCOUNTER
They called back.   They do not have any further questions at this time.   Consent faxed over to the infusion center.       Pooja Jaquez CMA 7/28/2021 4:53 PM   Thao DAMON

## 2021-07-29 ENCOUNTER — LAB (OUTPATIENT)
Dept: INFUSION THERAPY | Facility: CLINIC | Age: 71
End: 2021-07-29
Payer: COMMERCIAL

## 2021-07-29 ENCOUNTER — INFUSION THERAPY VISIT (OUTPATIENT)
Dept: INFUSION THERAPY | Facility: CLINIC | Age: 71
End: 2021-07-29
Payer: COMMERCIAL

## 2021-07-29 VITALS
OXYGEN SATURATION: 99 % | DIASTOLIC BLOOD PRESSURE: 82 MMHG | RESPIRATION RATE: 16 BRPM | HEART RATE: 73 BPM | TEMPERATURE: 98 F | SYSTOLIC BLOOD PRESSURE: 128 MMHG

## 2021-07-29 DIAGNOSIS — C18.2 MALIGNANT NEOPLASM OF ASCENDING COLON (H): Primary | ICD-10-CM

## 2021-07-29 DIAGNOSIS — D50.9 MICROCYTIC ANEMIA: ICD-10-CM

## 2021-07-29 LAB
ABO/RH(D): NORMAL
ANTIBODY SCREEN: NEGATIVE
BASOPHILS # BLD AUTO: 0.1 10E3/UL (ref 0–0.2)
BASOPHILS NFR BLD AUTO: 1 %
BLD PROD TYP BPU: NORMAL
BLD PROD TYP BPU: NORMAL
BLOOD COMPONENT TYPE: NORMAL
BLOOD COMPONENT TYPE: NORMAL
CODING SYSTEM: NORMAL
CODING SYSTEM: NORMAL
CROSSMATCH: NORMAL
CROSSMATCH: NORMAL
EOSINOPHIL # BLD AUTO: 0.2 10E3/UL (ref 0–0.7)
EOSINOPHIL NFR BLD AUTO: 2 %
ERYTHROCYTE [DISTWIDTH] IN BLOOD BY AUTOMATED COUNT: 17.5 % (ref 10–15)
HCT VFR BLD AUTO: 25.5 % (ref 40–53)
HGB BLD-MCNC: 7.3 G/DL (ref 13.3–17.7)
IMM GRANULOCYTES # BLD: 0 10E3/UL
IMM GRANULOCYTES NFR BLD: 0 %
ISSUE DATE AND TIME: NORMAL
ISSUE DATE AND TIME: NORMAL
LYMPHOCYTES # BLD AUTO: 1.4 10E3/UL (ref 0.8–5.3)
LYMPHOCYTES NFR BLD AUTO: 21 %
MCH RBC QN AUTO: 20.3 PG (ref 26.5–33)
MCHC RBC AUTO-ENTMCNC: 28.6 G/DL (ref 31.5–36.5)
MCV RBC AUTO: 71 FL (ref 78–100)
MONOCYTES # BLD AUTO: 0.5 10E3/UL (ref 0–1.3)
MONOCYTES NFR BLD AUTO: 8 %
NEUTROPHILS # BLD AUTO: 4.4 10E3/UL (ref 1.6–8.3)
NEUTROPHILS NFR BLD AUTO: 68 %
NRBC # BLD AUTO: 0 10E3/UL
NRBC BLD AUTO-RTO: 0 /100
PLATELET # BLD AUTO: 427 10E3/UL (ref 150–450)
RBC # BLD AUTO: 3.6 10E6/UL (ref 4.4–5.9)
SPECIMEN EXPIRATION DATE: NORMAL
UNIT ABO/RH: NORMAL
UNIT ABO/RH: NORMAL
UNIT NUMBER: NORMAL
UNIT NUMBER: NORMAL
UNIT STATUS: NORMAL
UNIT STATUS: NORMAL
UNIT TYPE ISBT: 6200
UNIT TYPE ISBT: 6200
WBC # BLD AUTO: 6.5 10E3/UL (ref 4–11)

## 2021-07-29 PROCEDURE — 86923 COMPATIBILITY TEST ELECTRIC: CPT | Performed by: FAMILY MEDICINE

## 2021-07-29 PROCEDURE — 36415 COLL VENOUS BLD VENIPUNCTURE: CPT | Performed by: FAMILY MEDICINE

## 2021-07-29 PROCEDURE — 85025 COMPLETE CBC W/AUTO DIFF WBC: CPT | Performed by: FAMILY MEDICINE

## 2021-07-29 PROCEDURE — 36430 TRANSFUSION BLD/BLD COMPNT: CPT

## 2021-07-29 PROCEDURE — 86900 BLOOD TYPING SEROLOGIC ABO: CPT | Performed by: FAMILY MEDICINE

## 2021-07-29 PROCEDURE — P9016 RBC LEUKOCYTES REDUCED: HCPCS | Performed by: FAMILY MEDICINE

## 2021-07-29 RX ORDER — HEPARIN SODIUM (PORCINE) LOCK FLUSH IV SOLN 100 UNIT/ML 100 UNIT/ML
5 SOLUTION INTRAVENOUS
Status: CANCELLED | OUTPATIENT
Start: 2021-07-29

## 2021-07-29 RX ORDER — HEPARIN SODIUM,PORCINE 10 UNIT/ML
5 VIAL (ML) INTRAVENOUS
Status: CANCELLED | OUTPATIENT
Start: 2021-07-29

## 2021-07-29 NOTE — PROGRESS NOTES
Infusion Nursing Note:  Regis Brooke presents today for  prbc transfusion    Patient seen by provider today: No   present during visit today: Not Applicable.    Note:Pt with recent colon cancer diagnosis and will be having surgery tomorrow.  Hgb 7.3 . Received 2 units prbc    Intravenous Access:  Peripheral IV placed.    Treatment Conditions:  Blood transfusion consent signed .    Post Infusion Assessment:  Patient tolerated infusion without incident.     Discharge Plan:   Patient discharged in stable condition accompanied by: self.      Lena GUTIÉRREZ RN

## 2021-07-30 ENCOUNTER — TELEPHONE (OUTPATIENT)
Dept: FAMILY MEDICINE | Facility: CLINIC | Age: 71
End: 2021-07-30

## 2021-07-30 ENCOUNTER — ANESTHESIA EVENT (OUTPATIENT)
Dept: SURGERY | Facility: HOSPITAL | Age: 71
DRG: 331 | End: 2021-07-30
Payer: COMMERCIAL

## 2021-07-30 ENCOUNTER — ANESTHESIA (OUTPATIENT)
Dept: SURGERY | Facility: HOSPITAL | Age: 71
DRG: 331 | End: 2021-07-30
Payer: COMMERCIAL

## 2021-07-30 ENCOUNTER — ANCILLARY PROCEDURE (OUTPATIENT)
Dept: ULTRASOUND IMAGING | Facility: HOSPITAL | Age: 71
DRG: 331 | End: 2021-07-30
Attending: ANESTHESIOLOGY
Payer: COMMERCIAL

## 2021-07-30 ENCOUNTER — HOSPITAL ENCOUNTER (INPATIENT)
Facility: HOSPITAL | Age: 71
LOS: 3 days | Discharge: HOME | DRG: 331 | End: 2021-08-02
Attending: COLON & RECTAL SURGERY | Admitting: COLON & RECTAL SURGERY
Payer: COMMERCIAL

## 2021-07-30 DIAGNOSIS — C18.2 MALIGNANT NEOPLASM OF ASCENDING COLON (H): Primary | ICD-10-CM

## 2021-07-30 PROBLEM — C18.9 COLON CANCER (H): Status: ACTIVE | Noted: 2021-07-30

## 2021-07-30 LAB
GLUCOSE BLDC GLUCOMTR-MCNC: 113 MG/DL (ref 70–125)
GLUCOSE BLDC GLUCOMTR-MCNC: 230 MG/DL (ref 70–125)
GLUCOSE BLDC GLUCOMTR-MCNC: 249 MG/DL (ref 70–125)
HGB BLD-MCNC: 9.1 G/DL (ref 13.3–17.7)

## 2021-07-30 PROCEDURE — 250N000025 HC SEVOFLURANE, PER MIN: Performed by: COLON & RECTAL SURGERY

## 2021-07-30 PROCEDURE — 85018 HEMOGLOBIN: CPT | Performed by: PHYSICIAN ASSISTANT

## 2021-07-30 PROCEDURE — 250N000011 HC RX IP 250 OP 636: Performed by: PHYSICIAN ASSISTANT

## 2021-07-30 PROCEDURE — 250N000009 HC RX 250: Performed by: UROLOGY

## 2021-07-30 PROCEDURE — 99232 SBSQ HOSP IP/OBS MODERATE 35: CPT | Performed by: HOSPITALIST

## 2021-07-30 PROCEDURE — 250N000009 HC RX 250: Performed by: COLON & RECTAL SURGERY

## 2021-07-30 PROCEDURE — C1769 GUIDE WIRE: HCPCS | Performed by: COLON & RECTAL SURGERY

## 2021-07-30 PROCEDURE — 250N000009 HC RX 250: Performed by: ANESTHESIOLOGY

## 2021-07-30 PROCEDURE — 250N000009 HC RX 250: Performed by: NURSE ANESTHETIST, CERTIFIED REGISTERED

## 2021-07-30 PROCEDURE — 258N000003 HC RX IP 258 OP 636: Performed by: NURSE ANESTHETIST, CERTIFIED REGISTERED

## 2021-07-30 PROCEDURE — 250N000011 HC RX IP 250 OP 636: Performed by: ANESTHESIOLOGY

## 2021-07-30 PROCEDURE — 250N000012 HC RX MED GY IP 250 OP 636 PS 637: Performed by: COLON & RECTAL SURGERY

## 2021-07-30 PROCEDURE — 710N000009 HC RECOVERY PHASE 1, LEVEL 1, PER MIN: Performed by: COLON & RECTAL SURGERY

## 2021-07-30 PROCEDURE — 250N000011 HC RX IP 250 OP 636: Performed by: NURSE ANESTHETIST, CERTIFIED REGISTERED

## 2021-07-30 PROCEDURE — 370N000017 HC ANESTHESIA TECHNICAL FEE, PER MIN: Performed by: COLON & RECTAL SURGERY

## 2021-07-30 PROCEDURE — 999N000141 HC STATISTIC PRE-PROCEDURE NURSING ASSESSMENT: Performed by: COLON & RECTAL SURGERY

## 2021-07-30 PROCEDURE — 36415 COLL VENOUS BLD VENIPUNCTURE: CPT | Performed by: PHYSICIAN ASSISTANT

## 2021-07-30 PROCEDURE — 258N000003 HC RX IP 258 OP 636: Performed by: ANESTHESIOLOGY

## 2021-07-30 PROCEDURE — 0DJD4ZZ INSPECTION OF LOWER INTESTINAL TRACT, PERCUTANEOUS ENDOSCOPIC APPROACH: ICD-10-PCS | Performed by: COLON & RECTAL SURGERY

## 2021-07-30 PROCEDURE — 250N000013 HC RX MED GY IP 250 OP 250 PS 637: Performed by: COLON & RECTAL SURGERY

## 2021-07-30 PROCEDURE — 250N000013 HC RX MED GY IP 250 OP 250 PS 637: Performed by: PHYSICIAN ASSISTANT

## 2021-07-30 PROCEDURE — 0T768DZ DILATION OF RIGHT URETER WITH INTRALUMINAL DEVICE, VIA NATURAL OR ARTIFICIAL OPENING ENDOSCOPIC: ICD-10-PCS | Performed by: UROLOGY

## 2021-07-30 PROCEDURE — P9041 ALBUMIN (HUMAN),5%, 50ML: HCPCS | Performed by: NURSE ANESTHETIST, CERTIFIED REGISTERED

## 2021-07-30 PROCEDURE — 88342 IMHCHEM/IMCYTCHM 1ST ANTB: CPT | Mod: TC | Performed by: COLON & RECTAL SURGERY

## 2021-07-30 PROCEDURE — 360N000077 HC SURGERY LEVEL 4, PER MIN: Performed by: COLON & RECTAL SURGERY

## 2021-07-30 PROCEDURE — 120N000001 HC R&B MED SURG/OB

## 2021-07-30 PROCEDURE — 272N000001 HC OR GENERAL SUPPLY STERILE: Performed by: COLON & RECTAL SURGERY

## 2021-07-30 PROCEDURE — 0DTF0ZZ RESECTION OF RIGHT LARGE INTESTINE, OPEN APPROACH: ICD-10-PCS | Performed by: COLON & RECTAL SURGERY

## 2021-07-30 RX ORDER — HEPARIN SODIUM 5000 [USP'U]/.5ML
5000 INJECTION, SOLUTION INTRAVENOUS; SUBCUTANEOUS
Status: COMPLETED | OUTPATIENT
Start: 2021-07-30 | End: 2021-07-30

## 2021-07-30 RX ORDER — FENTANYL CITRATE 50 UG/ML
50 INJECTION, SOLUTION INTRAMUSCULAR; INTRAVENOUS EVERY 5 MIN PRN
Status: DISCONTINUED | OUTPATIENT
Start: 2021-07-30 | End: 2021-07-30 | Stop reason: HOSPADM

## 2021-07-30 RX ORDER — ONDANSETRON 2 MG/ML
INJECTION INTRAMUSCULAR; INTRAVENOUS PRN
Status: DISCONTINUED | OUTPATIENT
Start: 2021-07-30 | End: 2021-07-30

## 2021-07-30 RX ORDER — ONDANSETRON 2 MG/ML
4 INJECTION INTRAMUSCULAR; INTRAVENOUS EVERY 6 HOURS PRN
Status: DISCONTINUED | OUTPATIENT
Start: 2021-07-30 | End: 2021-08-02 | Stop reason: HOSPADM

## 2021-07-30 RX ORDER — HYDROMORPHONE HCL IN WATER/PF 6 MG/30 ML
0.4 PATIENT CONTROLLED ANALGESIA SYRINGE INTRAVENOUS EVERY 5 MIN PRN
Status: DISCONTINUED | OUTPATIENT
Start: 2021-07-30 | End: 2021-07-30 | Stop reason: HOSPADM

## 2021-07-30 RX ORDER — SODIUM CHLORIDE, SODIUM LACTATE, POTASSIUM CHLORIDE, CALCIUM CHLORIDE 600; 310; 30; 20 MG/100ML; MG/100ML; MG/100ML; MG/100ML
INJECTION, SOLUTION INTRAVENOUS CONTINUOUS
Status: DISCONTINUED | OUTPATIENT
Start: 2021-07-30 | End: 2021-07-30 | Stop reason: HOSPADM

## 2021-07-30 RX ORDER — MAGNESIUM HYDROXIDE 1200 MG/15ML
LIQUID ORAL PRN
Status: DISCONTINUED | OUTPATIENT
Start: 2021-07-30 | End: 2021-07-30 | Stop reason: HOSPADM

## 2021-07-30 RX ORDER — ACETAMINOPHEN 325 MG/1
650 TABLET ORAL EVERY 4 HOURS PRN
Status: DISCONTINUED | OUTPATIENT
Start: 2021-08-02 | End: 2021-08-02 | Stop reason: HOSPADM

## 2021-07-30 RX ORDER — ONDANSETRON 4 MG/1
4 TABLET, ORALLY DISINTEGRATING ORAL EVERY 6 HOURS PRN
Status: DISCONTINUED | OUTPATIENT
Start: 2021-07-30 | End: 2021-08-02 | Stop reason: HOSPADM

## 2021-07-30 RX ORDER — ACETAMINOPHEN 325 MG/1
975 TABLET ORAL EVERY 8 HOURS
Status: COMPLETED | OUTPATIENT
Start: 2021-07-30 | End: 2021-08-02

## 2021-07-30 RX ORDER — FENTANYL CITRATE 50 UG/ML
INJECTION, SOLUTION INTRAMUSCULAR; INTRAVENOUS PRN
Status: DISCONTINUED | OUTPATIENT
Start: 2021-07-30 | End: 2021-07-30

## 2021-07-30 RX ORDER — BUPIVACAINE HYDROCHLORIDE AND EPINEPHRINE 2.5; 5 MG/ML; UG/ML
INJECTION, SOLUTION INFILTRATION; PERINEURAL
Status: COMPLETED | OUTPATIENT
Start: 2021-07-30 | End: 2021-07-30

## 2021-07-30 RX ORDER — HYDROMORPHONE HCL IN WATER/PF 6 MG/30 ML
0.2 PATIENT CONTROLLED ANALGESIA SYRINGE INTRAVENOUS
Status: DISCONTINUED | OUTPATIENT
Start: 2021-07-30 | End: 2021-08-02 | Stop reason: HOSPADM

## 2021-07-30 RX ORDER — SODIUM CHLORIDE, SODIUM LACTATE, POTASSIUM CHLORIDE, CALCIUM CHLORIDE 600; 310; 30; 20 MG/100ML; MG/100ML; MG/100ML; MG/100ML
INJECTION, SOLUTION INTRAVENOUS CONTINUOUS
Status: DISCONTINUED | OUTPATIENT
Start: 2021-07-30 | End: 2021-08-01

## 2021-07-30 RX ORDER — NALOXONE HYDROCHLORIDE 0.4 MG/ML
0.4 INJECTION, SOLUTION INTRAMUSCULAR; INTRAVENOUS; SUBCUTANEOUS
Status: DISCONTINUED | OUTPATIENT
Start: 2021-07-30 | End: 2021-08-02 | Stop reason: HOSPADM

## 2021-07-30 RX ORDER — ONDANSETRON 2 MG/ML
4 INJECTION INTRAMUSCULAR; INTRAVENOUS ONCE
Status: DISCONTINUED | OUTPATIENT
Start: 2021-07-30 | End: 2021-07-30 | Stop reason: HOSPADM

## 2021-07-30 RX ORDER — TAMSULOSIN HYDROCHLORIDE 0.4 MG/1
0.4 CAPSULE ORAL DAILY
Status: DISCONTINUED | OUTPATIENT
Start: 2021-07-31 | End: 2021-08-02 | Stop reason: HOSPADM

## 2021-07-30 RX ORDER — MAGNESIUM SULFATE 4 G/50ML
4 INJECTION INTRAVENOUS ONCE
Status: COMPLETED | OUTPATIENT
Start: 2021-07-30 | End: 2021-07-30

## 2021-07-30 RX ORDER — LIDOCAINE 40 MG/G
CREAM TOPICAL
Status: DISCONTINUED | OUTPATIENT
Start: 2021-07-30 | End: 2021-08-02 | Stop reason: HOSPADM

## 2021-07-30 RX ORDER — PROPOFOL 10 MG/ML
INJECTION, EMULSION INTRAVENOUS PRN
Status: DISCONTINUED | OUTPATIENT
Start: 2021-07-30 | End: 2021-07-30

## 2021-07-30 RX ORDER — DEXTROSE MONOHYDRATE 25 G/50ML
25-50 INJECTION, SOLUTION INTRAVENOUS
Status: DISCONTINUED | OUTPATIENT
Start: 2021-07-30 | End: 2021-08-01

## 2021-07-30 RX ORDER — LIDOCAINE HYDROCHLORIDE 20 MG/ML
INJECTION, SOLUTION INFILTRATION; PERINEURAL PRN
Status: DISCONTINUED | OUTPATIENT
Start: 2021-07-30 | End: 2021-07-30

## 2021-07-30 RX ORDER — CEFAZOLIN SODIUM 2 G/100ML
2 INJECTION, SOLUTION INTRAVENOUS
Status: COMPLETED | OUTPATIENT
Start: 2021-07-30 | End: 2021-07-30

## 2021-07-30 RX ORDER — UBIDECARENONE 75 MG
100 CAPSULE ORAL DAILY
COMMUNITY

## 2021-07-30 RX ORDER — ASPIRIN 81 MG/1
81 TABLET, CHEWABLE ORAL DAILY
Status: DISCONTINUED | OUTPATIENT
Start: 2021-07-31 | End: 2021-08-02 | Stop reason: HOSPADM

## 2021-07-30 RX ORDER — NICOTINE POLACRILEX 4 MG
15-30 LOZENGE BUCCAL
Status: DISCONTINUED | OUTPATIENT
Start: 2021-07-30 | End: 2021-08-01

## 2021-07-30 RX ORDER — ATORVASTATIN CALCIUM 40 MG/1
80 TABLET, FILM COATED ORAL DAILY
Status: DISCONTINUED | OUTPATIENT
Start: 2021-07-31 | End: 2021-08-02 | Stop reason: HOSPADM

## 2021-07-30 RX ORDER — NALOXONE HYDROCHLORIDE 0.4 MG/ML
0.2 INJECTION, SOLUTION INTRAMUSCULAR; INTRAVENOUS; SUBCUTANEOUS
Status: DISCONTINUED | OUTPATIENT
Start: 2021-07-30 | End: 2021-08-02 | Stop reason: HOSPADM

## 2021-07-30 RX ORDER — OXYCODONE HYDROCHLORIDE 5 MG/1
5 TABLET ORAL EVERY 4 HOURS PRN
Status: DISCONTINUED | OUTPATIENT
Start: 2021-07-30 | End: 2021-07-30 | Stop reason: ALTCHOICE

## 2021-07-30 RX ORDER — ALBUMIN, HUMAN INJ 5% 5 %
SOLUTION INTRAVENOUS CONTINUOUS PRN
Status: DISCONTINUED | OUTPATIENT
Start: 2021-07-30 | End: 2021-07-30

## 2021-07-30 RX ORDER — KETAMINE HYDROCHLORIDE 10 MG/ML
INJECTION INTRAMUSCULAR; INTRAVENOUS PRN
Status: DISCONTINUED | OUTPATIENT
Start: 2021-07-30 | End: 2021-07-30

## 2021-07-30 RX ORDER — DEXTROSE MONOHYDRATE 25 G/50ML
25-50 INJECTION, SOLUTION INTRAVENOUS
Status: DISCONTINUED | OUTPATIENT
Start: 2021-07-30 | End: 2021-08-02 | Stop reason: HOSPADM

## 2021-07-30 RX ORDER — NICOTINE POLACRILEX 4 MG
15-30 LOZENGE BUCCAL
Status: DISCONTINUED | OUTPATIENT
Start: 2021-07-30 | End: 2021-08-02 | Stop reason: HOSPADM

## 2021-07-30 RX ORDER — ONDANSETRON 4 MG/1
4 TABLET, ORALLY DISINTEGRATING ORAL EVERY 30 MIN PRN
Status: DISCONTINUED | OUTPATIENT
Start: 2021-07-30 | End: 2021-07-30 | Stop reason: HOSPADM

## 2021-07-30 RX ORDER — CEFAZOLIN SODIUM 2 G/100ML
2 INJECTION, SOLUTION INTRAVENOUS SEE ADMIN INSTRUCTIONS
Status: DISCONTINUED | OUTPATIENT
Start: 2021-07-30 | End: 2021-07-30 | Stop reason: HOSPADM

## 2021-07-30 RX ORDER — LIDOCAINE 40 MG/G
CREAM TOPICAL
Status: DISCONTINUED | OUTPATIENT
Start: 2021-07-30 | End: 2021-07-30 | Stop reason: HOSPADM

## 2021-07-30 RX ORDER — ONDANSETRON 2 MG/ML
4 INJECTION INTRAMUSCULAR; INTRAVENOUS EVERY 30 MIN PRN
Status: DISCONTINUED | OUTPATIENT
Start: 2021-07-30 | End: 2021-07-30 | Stop reason: HOSPADM

## 2021-07-30 RX ORDER — HYDROMORPHONE HCL IN WATER/PF 6 MG/30 ML
0.4 PATIENT CONTROLLED ANALGESIA SYRINGE INTRAVENOUS
Status: DISCONTINUED | OUTPATIENT
Start: 2021-07-30 | End: 2021-08-02 | Stop reason: HOSPADM

## 2021-07-30 RX ORDER — HALOPERIDOL 5 MG/ML
1 INJECTION INTRAMUSCULAR
Status: DISCONTINUED | OUTPATIENT
Start: 2021-07-30 | End: 2021-07-30 | Stop reason: HOSPADM

## 2021-07-30 RX ORDER — NEOSTIGMINE METHYLSULFATE 1 MG/ML
VIAL (ML) INJECTION PRN
Status: DISCONTINUED | OUTPATIENT
Start: 2021-07-30 | End: 2021-07-30

## 2021-07-30 RX ORDER — GLYCOPYRROLATE 0.2 MG/ML
INJECTION, SOLUTION INTRAMUSCULAR; INTRAVENOUS PRN
Status: DISCONTINUED | OUTPATIENT
Start: 2021-07-30 | End: 2021-07-30

## 2021-07-30 RX ORDER — CALCIUM CARBONATE 500(1250)
1 TABLET ORAL DAILY
COMMUNITY

## 2021-07-30 RX ORDER — DEXAMETHASONE SODIUM PHOSPHATE 4 MG/ML
INJECTION, SOLUTION INTRA-ARTICULAR; INTRALESIONAL; INTRAMUSCULAR; INTRAVENOUS; SOFT TISSUE PRN
Status: DISCONTINUED | OUTPATIENT
Start: 2021-07-30 | End: 2021-07-30

## 2021-07-30 RX ORDER — ACETAMINOPHEN 325 MG/1
975 TABLET ORAL ONCE
Status: COMPLETED | OUTPATIENT
Start: 2021-07-30 | End: 2021-07-30

## 2021-07-30 RX ORDER — EPHEDRINE SULFATE 50 MG/ML
INJECTION, SOLUTION INTRAMUSCULAR; INTRAVENOUS; SUBCUTANEOUS PRN
Status: DISCONTINUED | OUTPATIENT
Start: 2021-07-30 | End: 2021-07-30

## 2021-07-30 RX ADMIN — FENTANYL CITRATE 100 MCG: 50 INJECTION, SOLUTION INTRAMUSCULAR; INTRAVENOUS at 13:53

## 2021-07-30 RX ADMIN — Medication 10 MG: at 14:25

## 2021-07-30 RX ADMIN — HYDROMORPHONE HYDROCHLORIDE 0.5 MG: 1 INJECTION, SOLUTION INTRAMUSCULAR; INTRAVENOUS; SUBCUTANEOUS at 15:21

## 2021-07-30 RX ADMIN — Medication 10 MG: at 15:35

## 2021-07-30 RX ADMIN — NEOSTIGMINE METHYLSULFATE 4.5 MG: 1 INJECTION INTRAVENOUS at 17:42

## 2021-07-30 RX ADMIN — PHENYLEPHRINE HYDROCHLORIDE 0.5 MCG/KG/MIN: 10 INJECTION INTRAVENOUS at 14:37

## 2021-07-30 RX ADMIN — PHENYLEPHRINE HYDROCHLORIDE 200 MCG: 10 INJECTION INTRAVENOUS at 15:31

## 2021-07-30 RX ADMIN — DEXAMETHASONE SODIUM PHOSPHATE 4 MG: 4 INJECTION, SOLUTION INTRA-ARTICULAR; INTRALESIONAL; INTRAMUSCULAR; INTRAVENOUS; SOFT TISSUE at 14:16

## 2021-07-30 RX ADMIN — ROCURONIUM BROMIDE 50 MG: 10 INJECTION, SOLUTION INTRAVENOUS at 13:58

## 2021-07-30 RX ADMIN — Medication 10 MG: at 15:52

## 2021-07-30 RX ADMIN — HYDROMORPHONE HYDROCHLORIDE 0.5 MG: 1 INJECTION, SOLUTION INTRAMUSCULAR; INTRAVENOUS; SUBCUTANEOUS at 15:02

## 2021-07-30 RX ADMIN — KETAMINE HYDROCHLORIDE 50 MG: 10 INJECTION, SOLUTION INTRAMUSCULAR; INTRAVENOUS at 13:58

## 2021-07-30 RX ADMIN — ACETAMINOPHEN 975 MG: 325 TABLET ORAL at 12:38

## 2021-07-30 RX ADMIN — SODIUM CHLORIDE, POTASSIUM CHLORIDE, SODIUM LACTATE AND CALCIUM CHLORIDE: 600; 310; 30; 20 INJECTION, SOLUTION INTRAVENOUS at 14:50

## 2021-07-30 RX ADMIN — BUPIVACAINE HYDROCHLORIDE AND EPINEPHRINE BITARTRATE 40 ML: 2.5; .005 INJECTION, SOLUTION INFILTRATION; PERINEURAL at 14:10

## 2021-07-30 RX ADMIN — SODIUM CHLORIDE, POTASSIUM CHLORIDE, SODIUM LACTATE AND CALCIUM CHLORIDE: 600; 310; 30; 20 INJECTION, SOLUTION INTRAVENOUS at 12:40

## 2021-07-30 RX ADMIN — ONDANSETRON 4 MG: 2 INJECTION INTRAMUSCULAR; INTRAVENOUS at 17:42

## 2021-07-30 RX ADMIN — CEFAZOLIN SODIUM 2 G: 2 INJECTION, SOLUTION INTRAVENOUS at 14:15

## 2021-07-30 RX ADMIN — PROPOFOL 120 MG: 10 INJECTION, EMULSION INTRAVENOUS at 13:58

## 2021-07-30 RX ADMIN — LIDOCAINE HYDROCHLORIDE 60 MG: 20 INJECTION, SOLUTION INFILTRATION; PERINEURAL at 13:58

## 2021-07-30 RX ADMIN — ALBUMIN HUMAN: 0.05 INJECTION, SOLUTION INTRAVENOUS at 16:16

## 2021-07-30 RX ADMIN — PHENYLEPHRINE HYDROCHLORIDE 200 MCG: 10 INJECTION INTRAVENOUS at 14:12

## 2021-07-30 RX ADMIN — GLYCOPYRROLATE 0.7 MG: 0.2 INJECTION, SOLUTION INTRAMUSCULAR; INTRAVENOUS at 17:42

## 2021-07-30 RX ADMIN — Medication 10 MG: at 14:19

## 2021-07-30 RX ADMIN — PHENYLEPHRINE HYDROCHLORIDE 200 MCG: 10 INJECTION INTRAVENOUS at 14:36

## 2021-07-30 RX ADMIN — ONDANSETRON 4 MG: 2 INJECTION INTRAMUSCULAR; INTRAVENOUS at 14:16

## 2021-07-30 RX ADMIN — METRONIDAZOLE 500 MG: 500 INJECTION, SOLUTION INTRAVENOUS at 12:45

## 2021-07-30 RX ADMIN — HEPARIN SODIUM 5000 UNITS: 5000 INJECTION INTRAVENOUS; SUBCUTANEOUS at 14:13

## 2021-07-30 RX ADMIN — ROCURONIUM BROMIDE 10 MG: 10 INJECTION, SOLUTION INTRAVENOUS at 16:31

## 2021-07-30 RX ADMIN — Medication 5 MG: at 14:30

## 2021-07-30 RX ADMIN — PHENYLEPHRINE HYDROCHLORIDE 100 MCG: 10 INJECTION INTRAVENOUS at 14:10

## 2021-07-30 RX ADMIN — PHENYLEPHRINE HYDROCHLORIDE 200 MCG: 10 INJECTION INTRAVENOUS at 15:27

## 2021-07-30 RX ADMIN — Medication 5 MG: at 16:01

## 2021-07-30 RX ADMIN — ROCURONIUM BROMIDE 50 MG: 10 INJECTION, SOLUTION INTRAVENOUS at 14:46

## 2021-07-30 RX ADMIN — MAGNESIUM SULFATE IN WATER 4 G: 80 INJECTION, SOLUTION INTRAVENOUS at 13:01

## 2021-07-30 RX ADMIN — INSULIN ASPART 2 UNITS: 100 INJECTION, SOLUTION INTRAVENOUS; SUBCUTANEOUS at 21:14

## 2021-07-30 RX ADMIN — ALBUMIN HUMAN: 0.05 INJECTION, SOLUTION INTRAVENOUS at 16:03

## 2021-07-30 RX ADMIN — ACETAMINOPHEN 975 MG: 325 TABLET ORAL at 21:16

## 2021-07-30 RX ADMIN — PHENYLEPHRINE HYDROCHLORIDE 200 MCG: 10 INJECTION INTRAVENOUS at 14:46

## 2021-07-30 RX ADMIN — PHENYLEPHRINE HYDROCHLORIDE 200 MCG: 10 INJECTION INTRAVENOUS at 16:01

## 2021-07-30 RX ADMIN — SODIUM CHLORIDE, POTASSIUM CHLORIDE, SODIUM LACTATE AND CALCIUM CHLORIDE: 600; 310; 30; 20 INJECTION, SOLUTION INTRAVENOUS at 17:29

## 2021-07-30 RX ADMIN — MIDAZOLAM HYDROCHLORIDE 1 MG: 1 INJECTION, SOLUTION INTRAMUSCULAR; INTRAVENOUS at 13:53

## 2021-07-30 ASSESSMENT — ENCOUNTER SYMPTOMS: DYSRHYTHMIAS: 0

## 2021-07-30 ASSESSMENT — MIFFLIN-ST. JEOR: SCORE: 1655.76

## 2021-07-30 NOTE — ANESTHESIA CARE TRANSFER NOTE
Patient: Regis JONES Edwardo    Procedure(s):  LAPAROSCOPIC  OPEN RIGHT COLECTOMY  CYSTOSCOPY, RIGHT STENT AND URETERAL CATHETERIZATION    Diagnosis: Malignant neoplasm of ascending colon (H) [C18.2]  Diagnosis Additional Information: No value filed.    Anesthesia Type:   General     Note:    Oropharynx: oropharynx clear of all foreign objects  Level of Consciousness: drowsy  Oxygen Supplementation: face mask  Level of Supplemental Oxygen (L/min / FiO2): 8  Independent Airway: airway patency satisfactory and stable    Vital Signs Stable: post-procedure vital signs reviewed and stable  Report to RN Given: handoff report given  Patient transferred to: PACU  Comments: Volatile agents turned off, muscle relaxant reversed, 4/4 twitches with sustained tetany. Pt breathing spontaneously with adequate tidal volumes, following commands, gently suctioned oropharynx, extubated without issue. 10LPM O2 applied via face mask.Transported by CRNA and RN to recovery.    Handoff Report: Identifed the Patient, Identified the Reponsible Provider, Reviewed the pertinent medical history, Discussed the surgical course, Reviewed Intra-OP anesthesia mangement and issues during anesthesia, Set expectations for post-procedure period and Allowed opportunity for questions and acknowledgement of understanding      Vitals:  Vitals Value Taken Time   /61 07/30/21 1800   Temp 37.1  C (98.7  F) 07/30/21 1756   Pulse 89 07/30/21 1801   Resp 21 07/30/21 1801   SpO2 100 % 07/30/21 1801   Vitals shown include unvalidated device data.    Electronically Signed By: IBIS Lopez CRNA  July 30, 2021  6:02 PM

## 2021-07-30 NOTE — BRIEF OP NOTE
[facility]    Brief Operative Note    Pre-operative diagnosis: Malignant neoplasm of ascending colon (H) [C18.2]  Post-operative diagnosis Same as pre-operative diagnosis    Procedure: Procedure(s):  LAPAROSCOPIC  POSSIBLE OPEN RIGHT COLECTOMY  CYSTOSCOPY, RIGHT STENT AND URETERAL CATHETERIZATION  Surgeon: Surgeon(s) and Role:  Panel 1:     * Ana Dorsey MD - Primary     * Zan Deshpande II, MD - Assisting  Panel 2:     * Wyatt Mcnulty MD - Primary  Anesthesia: Combined General with Tap Block   Estimated blood loss: Less than 50 ml  Drains: None  Specimens: * No specimens in log *  Findings:   Large tumor in cecum stuck up to hepatic flexure, adenopathy.  Complications: None.  Implants: * No implants in log *      Ana Dorsey MD  [facility]   [phone].        ADDENDUM:    PATIENT DATA  Indicate Y or N:  Home O2 n  Hemodialysis n  Transplant patient n  Cirrhosis n  Steroids in last 30 days n  Immunomodulators in last 30 days n  Anticoagulation at time of surgery n   List medication   Prior abdominal surgery y  Pelvic irradiation n    Albumin within 30 days if known 3.2  Hgb within 30 days if known 9.1  Cr within 30 days if known 0.70    OR DATA  Emergent n   <24 hours n   <1 week n  Bowel Prep (Y or N) y  Antibiotics (Y or N) y  DVT prophylaxis    Heparin y   SCD y   None n  Drain n  ASA (1,2,3,4) 3  OR time (min) 200 minutes  Stents y  Transfuse >/= 2U n  Anastomosis   Stapled   Leak Test    Positive n   Negative n   Not done y    FOR CANCER ALSO COMPLETE:  Preoperative treatment (Y or N)   Chemo n   Radiation n   Diversion n    CEA 2.7  Metastatic disease at time of operation (Y or N) n

## 2021-07-30 NOTE — TELEPHONE ENCOUNTER
Reason for Call: Awilda is calling to have provider call Dr. Ana Dorsey to discuss surgery clearance.    Detailed comments: [phone]  Pre op visit needs to be addended to clear patient for surgery today at 11:00.    Phone Number Patient can be reached at: Other phone number:  [phone]     Best Time: asap     Can we leave a detailed message on this number? YES    Call taken on 7/30/2021 at 9:13 AM by Sapphire Cooper

## 2021-07-30 NOTE — ANESTHESIA PREPROCEDURE EVALUATION
Anesthesia Pre-Procedure Evaluation    Patient: Regis Brooke   MRN: [id number] : 1950        Preoperative Diagnosis: Malignant neoplasm of ascending colon (H) [C18.2]   Procedure : Procedure(s):  LAPAROSCOPIC  POSSIBLE OPEN RIGHT COLECTOMY  CYSTOSCOPY, BILATERAL STENT AND URETERAL CATHETERIZATION     Past Medical History:   Diagnosis Date     Calculus of kidney     Created by Conversion      Cognitive deficits     wife states not prominent     Essential hypertension     Lisinopril and norvasc.  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)     Created by Conversion [facility] Annotation: May 29 2011  4:34PM - Negar Lawler: RIGHT KIDNEY S/P  PARTIAL NEPHRECTOMY, 2011  Replacement Utility updated for latest IMO load     Type 2 diabetes mellitus without complication (H)     Created by Conversion      Vitamin D deficiency     Created by Conversion  Replacement Utility updated for latest IMO load      Past Surgical History:   Procedure Laterality Date     C REMV KIDNEY,W/RIB RESECTION      Description: Nephrectomy;  Recorded: 2011;  Comments: RIGHT PARTIAL NEPHRECTOMY, 2010     HC REMOVAL GALLBLADDER      Description: Cholecystectomy;  Recorded: 2008;     HC REPAIR UMBILICAL RITU,<6Y/O,REDUC      Description: Umbilical Hernia Repair;  Recorded: 2008;     IR LUMBAR EPIDURAL STEROID INJECTION  2008      Allergies   Allergen Reactions     Morphine Nausea and Vomiting      Social History     Tobacco Use     Smoking status: Former Smoker     Smokeless tobacco: Never Used   Substance Use Topics     Alcohol use: Yes     Alcohol/week: 1.0 standard drinks     Types: 1 Glasses of wine per week      Wt Readings from Last 1 Encounters:   21 89 kg (196 lb 1.6 oz)        Anesthesia Evaluation   Pt has had prior anesthetic. Type: General.    No  history of anesthetic complications       ROS/MED HX  ENT/Pulmonary:    (-) sleep apnea   Neurologic: Comment: [place]   (-) no CVA   Cardiovascular:     (+) Dyslipidemia hypertension----- (-) arrhythmias and valvular problems/murmurs   METS/Exercise Tolerance:     Hematologic:     (+) anemia (microcytic, related to colon CA - s/p 2 unit prbc transfusion per pcp for hgb 7.6 preop),     Musculoskeletal:    (-) arthritis   GI/Hepatic: Comment: Mass of ascending colon s/f lap colectomy    (+) bowel prep,  (-) GERD and hiatal hernia   Renal/Genitourinary:     (+) renal disease (hx RCC s/p partial R nephrectomy),     Endo:     (+) type II DM, Last HgA1c: 6, Not using insulin,  (-) Type I DM   Psychiatric/Substance Use:    (-) psychiatric history and chronic opioid use history   Infectious Disease:       Malignancy:   (+) Malignancy, History of GI.GI CA  Active status post Surgery.        Other:      (+) , no H/O Chronic Pain,        Physical Exam    Airway        Mallampati: II   TM distance: > 3 FB   Neck ROM: full   Mouth opening: > 3 cm    Respiratory Devices and Support         Dental  no notable dental history         Cardiovascular   cardiovascular exam normal          Pulmonary   pulmonary exam normal            Other findings: Hearing aids in place    OUTSIDE LABS:  CBC:   Lab Results   Component Value Date    WBC 6.5 07/29/2021    WBC 6.5 07/21/2021    HGB 7.3 (L) 07/29/2021    HGB 7.3 (LL) 07/21/2021    HCT 25.5 (L) 07/29/2021    HCT 24.6 (L) 07/21/2021     07/29/2021     07/21/2021     BMP:   Lab Results   Component Value Date     07/21/2021     12/18/2020    POTASSIUM 4.5 07/21/2021    POTASSIUM 4.0 12/18/2020    CHLORIDE 106 07/21/2021    CHLORIDE 103 12/18/2020    CO2 23 07/21/2021    CO2 23 12/18/2020    BUN 20 07/21/2021    BUN 17 12/18/2020    CR 0.70 07/21/2021    CR 0.68 (L) 12/18/2020     (H) 07/21/2021     12/18/2020     COAGS: No results found for: PTT, INR,  FIBR  POC: No results found for: BGM, HCG, HCGS  HEPATIC:   Lab Results   Component Value Date    ALBUMIN 3.2 (L) 07/21/2021    PROTTOTAL 6.5 07/21/2021    ALT <9 07/21/2021    AST 11 07/21/2021    ALKPHOS 72 07/21/2021    BILITOTAL 0.3 07/21/2021     OTHER:   Lab Results   Component Value Date    A1C 6.0 (H) 06/04/2021    EDER 9.0 07/21/2021    MAG 2.2 03/07/2020    LIPASE 31 03/07/2020    TSH 1.81 07/17/2018       Anesthesia Plan    ASA Status:  3      Anesthesia Type: General.     - Airway: ETT   Induction: Intravenous.   Maintenance: Inhalation.   Techniques and Equipment:     - Lines/Monitors: 2nd IV     Consents            Postoperative Care    Pain management: IV analgesics, Multi-modal analgesia, Peripheral nerve block (Single Shot).   PONV prophylaxis: Ondansetron (or other 5HT-3), Dexamethasone or Solumedrol     Comments:    GETA  Magnesium gtt, ketamine 50mg, TAP block post induction for post op pain control per surgeon request  Decadron 4, Zofran 4              Tesha Greene MD

## 2021-07-30 NOTE — ANESTHESIA PROCEDURE NOTES
Airway       Patient location during procedure: OR       Procedure Start/Stop Times: 7/30/2021 2:00 PM  Staff -        Anesthesiologist:  Tesha Jones MD       CRNA: Min Simpson APRN CRNA       Performed By: CRNA  Consent for Airway        Urgency: elective  Indications and Patient Condition       Indications for airway management: jose carlos-procedural         Mask difficulty assessment: 2 - vent by mask + OA or adjuvant +/- NMBA    Final Airway Details       Final airway type: endotracheal airway       Successful airway: ETT - single  Endotracheal Airway Details        ETT size (mm): 8.0       Successful intubation technique: direct laryngoscopy       DL Blade Type: Pappas 2       Grade View of Cords: 1       Adjucts: stylet and tooth guard       Position: Right       Bite block used: Soft    Post intubation assessment        Placement verified by: capnometry, equal breath sounds and chest rise        Number of attempts at approach: 1       Number of other approaches attempted: 0       Secured with: silk tape       Ease of procedure: easy       Dentition: Intact and Unchanged

## 2021-07-30 NOTE — PHARMACY - DISCHARGE MEDICATION RECONCILIATION
Pharmacy Note - Admission Medication History    Pertinent Provider Information:    ______________________________________________________________________    Prior To Admission (PTA) med list completed and updated in EMR.       PTA Med List   Medication Sig Last Dose     amLODIPine (NORVASC) 10 MG tablet [AMLODIPINE (NORVASC) 10 MG TABLET] Take 1 tablet (10 mg total) by mouth daily. As directed 7/29/2021 at am     aspirin (ASA) 325 MG EC tablet Take 325 mg by mouth daily Past Week at Unknown time     atorvastatin (LIPITOR) 80 MG tablet [ATORVASTATIN (LIPITOR) 80 MG TABLET] TAKE 1 TABLET(80 MG) BY MOUTH DAILY 7/29/2021 at am     blood glucose test strips [BLOOD GLUCOSE TEST STRIPS] Use 1 each As Directed 3 (three) times a day.      calcium carbonate (OS-EDER) 500 MG tablet Take 1 tablet by mouth daily Past Week at Unknown time     CRANBERRY EXTRACT (CRANBERRY ORAL) [CRANBERRY EXTRACT (CRANBERRY ORAL)] Take by mouth. Past Week at Unknown time     cyanocobalamin (VITAMIN B-12) 100 MCG tablet Take 100 mcg by mouth daily Past Week at Unknown time     dulaglutide (TRULICITY) 1.5 mg/0.5 mL PnIj [DULAGLUTIDE (TRULICITY) 1.5 MG/0.5 ML PNIJ] Inject 1.5 mg under the skin every 7 days. 7/25/2021     ferrous sulfate 325 (65 FE) MG tablet [FERROUS SULFATE 325 (65 FE) MG TABLET] Take 1 tablet (325 mg total) by mouth daily. Past Week at Unknown time     glipiZIDE (GLUCOTROL XL) 10 MG 24 hr tablet [GLIPIZIDE (GLUCOTROL XL) 10 MG 24 HR TABLET] Take 1 tablet (10 mg total) by mouth daily. 7/29/2021 at am     lisinopriL (PRINIVIL,ZESTRIL) 20 MG tablet [LISINOPRIL (PRINIVIL,ZESTRIL) 20 MG TABLET] Take 1 tablet (20 mg total) by mouth daily. 7/29/2021 at am     metFORMIN (GLUCOPHAGE) 500 MG tablet [METFORMIN (GLUCOPHAGE) 500 MG TABLET] TAKE 2 TABLETS(1000 MG) BY MOUTH TWICE DAILY WITH MEALS 7/29/2021 at am     MULTIVITAMIN ORAL Take 1 tablet by mouth daily  Past Week at Unknown time     tamsulosin (FLOMAX) 0.4 mg cap [TAMSULOSIN (FLOMAX) 0.4  MG CAP] TAKE 1 CAPSULE(0.4 MG) BY MOUTH DAILY AFTER BREAKFAST 7/30/2021 at am     Vitamin D3 (CHOLECALCIFEROL) 125 MCG (5000 UT) tablet Take 125 mcg by mouth daily Past Week at Unknown time       Information source(s): Patient    Method of interview communication: in-person    Patient was asked about OTC/herbal products specifically.  PTA med list reflects this.    Based on the pharmacist's assessment, the PTA med list information appears reliable    Allergies were reviewed, assessed, and updated with the patient.      Patient did not bring any medications to the hospital and can't retrieve from home. No multi-dose medications are available for use during hospital stay.      Thank you for the opportunity to participate in the care of this patient.      Pam Kyle RPH     7/30/2021     12:57 PM

## 2021-07-30 NOTE — ANESTHESIA PROCEDURE NOTES
TAP Procedure Note  Pre-Procedure   Staff -        Anesthesiologist:  Tesha Jones MD       Performed By: anesthesiologist       Location: OR       Procedure Start/Stop Times: 7/30/2021 2:05 PM and 7/30/2021 2:11 PM       Pre-Anesthestic Checklist: patient identified, IV checked, site marked, risks and benefits discussed, informed consent, monitors and equipment checked, pre-op evaluation, at physician/surgeon's request and post-op pain management  Timeout:       Correct Patient: Yes        Correct Procedure: Yes        Correct Site: Yes        Correct Position: Yes        Correct Laterality: Yes        Site Marked: Yes  Procedure Documentation  Procedure: TAP       Laterality: bilateral       Patient Position: supine       Skin prep: Chloraprep       Needle type: Stimuplex.       Needle Gauge: 21.        Needle Length (Inches): 6        Ultrasound guided       1. Ultrasound was used to identify targeted nerve, plexus, vascular marker, or fascial plane and place a needle adjacent to it in real-time.       2. Ultrasound was used to visualize the spread of anesthetic in close proximity to the above referenced structure.       3. A permanent image is entered into the patient's record.       4. The visualized anatomic structures appeared normal.       5. There were no apparent abnormal pathologic findings.    Assessment/Narrative         The placement was negative for: blood aspirated and site bleedingInjection painful: Sedated.       Paresthesias: No.     Bolus given via needle..        Secured via.        Insertion/Infusion Method: Single Shot       Injection made incrementally with aspirations every 5 mL.    Medication(s) Administered   Bupivacaine 0.25% w/ 1:200K Epi (Injection), 40 mL  Medication Administration Time: 7/30/2021 2:10 PM    Comments:  Local anesthetic given 20cc per side, in divided 5cc doses.

## 2021-07-31 LAB
ANION GAP SERPL CALCULATED.3IONS-SCNC: 8 MMOL/L (ref 5–18)
BUN SERPL-MCNC: 6 MG/DL (ref 8–28)
CALCIUM SERPL-MCNC: 8.4 MG/DL (ref 8.5–10.5)
CHLORIDE BLD-SCNC: 105 MMOL/L (ref 98–107)
CO2 SERPL-SCNC: 24 MMOL/L (ref 22–31)
CREAT SERPL-MCNC: 0.6 MG/DL (ref 0.7–1.3)
ERYTHROCYTE [DISTWIDTH] IN BLOOD BY AUTOMATED COUNT: 18.1 % (ref 10–15)
GFR SERPL CREATININE-BSD FRML MDRD: >90 ML/MIN/1.73M2
GLUCOSE BLD-MCNC: 132 MG/DL (ref 70–125)
GLUCOSE BLDC GLUCOMTR-MCNC: 104 MG/DL (ref 70–125)
GLUCOSE BLDC GLUCOMTR-MCNC: 109 MG/DL (ref 70–125)
GLUCOSE BLDC GLUCOMTR-MCNC: 128 MG/DL (ref 70–125)
GLUCOSE BLDC GLUCOMTR-MCNC: 136 MG/DL (ref 70–125)
GLUCOSE BLDC GLUCOMTR-MCNC: 150 MG/DL (ref 70–125)
GLUCOSE BLDC GLUCOMTR-MCNC: 204 MG/DL (ref 70–125)
HCT VFR BLD AUTO: 28.7 % (ref 40–53)
HGB BLD-MCNC: 8.4 G/DL (ref 13.3–17.7)
MAGNESIUM SERPL-MCNC: 2 MG/DL (ref 1.8–2.6)
MCH RBC QN AUTO: 21.4 PG (ref 26.5–33)
MCHC RBC AUTO-ENTMCNC: 29.3 G/DL (ref 31.5–36.5)
MCV RBC AUTO: 73 FL (ref 78–100)
PLATELET # BLD AUTO: 385 10E3/UL (ref 150–450)
POTASSIUM BLD-SCNC: 3.7 MMOL/L (ref 3.5–5)
RBC # BLD AUTO: 3.93 10E6/UL (ref 4.4–5.9)
SARS-COV-2 RNA RESP QL NAA+PROBE: NEGATIVE
SODIUM SERPL-SCNC: 137 MMOL/L (ref 136–145)
WBC # BLD AUTO: 12 10E3/UL (ref 4–11)

## 2021-07-31 PROCEDURE — 85027 COMPLETE CBC AUTOMATED: CPT | Performed by: COLON & RECTAL SURGERY

## 2021-07-31 PROCEDURE — 36415 COLL VENOUS BLD VENIPUNCTURE: CPT | Performed by: COLON & RECTAL SURGERY

## 2021-07-31 PROCEDURE — 258N000003 HC RX IP 258 OP 636: Performed by: COLON & RECTAL SURGERY

## 2021-07-31 PROCEDURE — 87635 SARS-COV-2 COVID-19 AMP PRB: CPT | Performed by: HOSPITALIST

## 2021-07-31 PROCEDURE — 83735 ASSAY OF MAGNESIUM: CPT | Performed by: COLON & RECTAL SURGERY

## 2021-07-31 PROCEDURE — 120N000001 HC R&B MED SURG/OB

## 2021-07-31 PROCEDURE — 250N000011 HC RX IP 250 OP 636: Performed by: COLON & RECTAL SURGERY

## 2021-07-31 PROCEDURE — 80048 BASIC METABOLIC PNL TOTAL CA: CPT | Performed by: COLON & RECTAL SURGERY

## 2021-07-31 PROCEDURE — 99232 SBSQ HOSP IP/OBS MODERATE 35: CPT | Performed by: HOSPITALIST

## 2021-07-31 PROCEDURE — 250N000013 HC RX MED GY IP 250 OP 250 PS 637: Performed by: COLON & RECTAL SURGERY

## 2021-07-31 RX ADMIN — ENOXAPARIN SODIUM 40 MG: 100 INJECTION SUBCUTANEOUS at 13:07

## 2021-07-31 RX ADMIN — SODIUM CHLORIDE, POTASSIUM CHLORIDE, SODIUM LACTATE AND CALCIUM CHLORIDE: 600; 310; 30; 20 INJECTION, SOLUTION INTRAVENOUS at 06:56

## 2021-07-31 RX ADMIN — TAMSULOSIN HYDROCHLORIDE 0.4 MG: 0.4 CAPSULE ORAL at 09:09

## 2021-07-31 RX ADMIN — ACETAMINOPHEN 975 MG: 325 TABLET ORAL at 21:32

## 2021-07-31 RX ADMIN — INSULIN ASPART 1 UNITS: 100 INJECTION, SOLUTION INTRAVENOUS; SUBCUTANEOUS at 05:42

## 2021-07-31 RX ADMIN — ATORVASTATIN CALCIUM 80 MG: 40 TABLET, FILM COATED ORAL at 09:09

## 2021-07-31 RX ADMIN — HYDROMORPHONE HYDROCHLORIDE 0.2 MG: 0.2 INJECTION, SOLUTION INTRAMUSCULAR; INTRAVENOUS; SUBCUTANEOUS at 01:51

## 2021-07-31 RX ADMIN — HYDROMORPHONE HYDROCHLORIDE 0.2 MG: 0.2 INJECTION, SOLUTION INTRAMUSCULAR; INTRAVENOUS; SUBCUTANEOUS at 09:09

## 2021-07-31 RX ADMIN — INSULIN ASPART 2 UNITS: 100 INJECTION, SOLUTION INTRAVENOUS; SUBCUTANEOUS at 01:31

## 2021-07-31 RX ADMIN — ASPIRIN 81 MG: 81 TABLET, CHEWABLE ORAL at 09:09

## 2021-07-31 RX ADMIN — SODIUM CHLORIDE, POTASSIUM CHLORIDE, SODIUM LACTATE AND CALCIUM CHLORIDE: 600; 310; 30; 20 INJECTION, SOLUTION INTRAVENOUS at 21:07

## 2021-07-31 RX ADMIN — ACETAMINOPHEN 975 MG: 325 TABLET ORAL at 05:38

## 2021-07-31 RX ADMIN — ACETAMINOPHEN 975 MG: 325 TABLET ORAL at 13:07

## 2021-07-31 ASSESSMENT — MIFFLIN-ST. JEOR: SCORE: 1651.68

## 2021-07-31 NOTE — PLAN OF CARE
Shift from 0700 to 1930-  Problem: Pain (Bowel Resection)  Goal: Acceptable Pain Control  Outcome: Improving  Intervention: Prevent or Manage Pain  Recent Flowsheet Documentation  Taken 7/31/2021 1307 by Carrie Bunch RN  Pain Management Interventions: emotional support  Taken 7/31/2021 1131 by Carrie Bunch, RN  Pain Management Interventions: emotional support  Taken 7/31/2021 0909 by Carrie Bunch RN  Pain Management Interventions:    medication (see MAR)    emotional support  Patient ambulating in hallway every 3 hrs; Sat in chair for breakfast, lunch and dinner.  Abdominal pain controlled with IV dilaudid; 0.2mg prn x1 and scheduled tylenol. See Pain assessment. Binder in place.   Tolerating clears; no flatus at this time. Active bowel sounds.   Using incentive spirometer independently.

## 2021-07-31 NOTE — PROGRESS NOTES
"[facility]  Hospitalist Progress Note  [facility]        Date of Service (when I saw the patient): 07/31/2021  Steve Escalante,   07/31/2021        Interval History:      Patient states he is doing well, discussed plan of care, verbalized understanding.          Assessment and Plan:        Regis Brooke is a 71 year old male admitted on 7/30/2021. He has history of colon cancer, microcytic anemia, type 2 diabetes, essential hypertension, dyslipidemia, cognitive deficit, renal cell carcinoma, kidney stones, vitamin D deficiency.  [facility] consulted by colorectal surgery team for postop medical management.  Preop H&P reviewed.  Patient is status post 2 units PRBCs transfusion on July 29, 2021.     Colon cancer Status post right colectomy  - Postoperative pain management, DVT prophylaxis and weightbearing status as per primary surgery service     Type 2 diabetes mellitus without complications without long-term insulin  - Continue medium insulin resistant sliding scale  - Hold PTA glipizide, Metformin      Essential hypertension Normotensive postop  - hold PTA amlodipine, lisinopril      Preop anemia Status post 2 unit PRBCs transfusion preop on July 29, 2021  - Monitor hemoglobin  - Resume ferrous sulfate when taking by mouth well     BPH  - On tamsulosin     Hematuria Mark looking urine in the Oneil catheter bag, ureteral stent removed at the end of the case, per report.   - Monitor.      Dyslipidemia  - On atorvastatin     Cognitive deficits Hard of hearing  - At risk for acute delirium  - Monitor closely     DVT Prophylaxis: Defer to Surgery.     Code: Full      Diet: Orders Placed This Encounter      Clear Liquid Diet    Disposition: Defer to Surgery.                    Physical Exam:           Blood pressure 129/70, pulse 83, temperature 97.8  F (36.6  C), temperature source Oral, resp. rate 18, height 1.753 m (5' 9\"), weight 90.5 kg (199 lb 9.6 oz), SpO2 96 %.    Vital " Sign Ranges  Temperature Temp  Av.8  F (36.6  C)  Min: 97.5  F (36.4  C)  Max: 98.7  F (37.1  C)   Blood pressure Systolic (24hrs), Av , Min:116 , Max:130        Diastolic (24hrs), Av, Min:59, Max:71      Pulse Pulse  Av.3  Min: 83  Max: 100   Respirations Resp  Av.5  Min: 8  Max: 22   Pulse oximetry SpO2  Av.1 %  Min: 94 %  Max: 100 %     Vital Signs with Ranges  Temp:  [97.5  F (36.4  C)-98.7  F (37.1  C)] 97.8  F (36.6  C)  Pulse:  [] 83  Resp:  [8] 18  BP: (116-130)/(59-71) 129/70  SpO2:  [94 %-100 %] 96 %  Temp:  [97.5  F (36.4  C)-98.7  F (37.1  C)] 97.8  F (36.6  C)  Pulse:  [] 83  Resp:  [] 18  BP: (116-130)/(59-71) 129/70  SpO2:  [94 %-100 %] 96 %    I/O Last 3 Shifts:   I/O last 3 completed shifts:  In: 3682 [I.V.:2928; IV Piggyback:254]  Out: 1870 [Urine:1850; Blood:20]    I/O past 24 hours:     Intake/Output Summary (Last 24 hours) at 2021 0729  Last data filed at 2021 0656  Gross per 24 hour   Intake 3682 ml   Output 1870 ml   Net 1812 ml       Oxygen  Temp: 97.8  F (36.6  C) Temp src: Oral BP: 129/70 Pulse: 83   Resp: 18 SpO2: 96 % O2 Device: None (Room air) Oxygen Delivery: 8 LPM  Vitals:    21 1114 21 1914   Weight: 89 kg (196 lb 1.6 oz) 90.5 kg (199 lb 9.6 oz)       Lines  Peripheral IV 21 Anterior;Right Lower forearm (Active)   Site Assessment WDL 21 0150   Line Status Infusing 21 0150   Phlebitis Scale 0-->no symptoms 21 015   Infiltration Scale 0 21 015   Infiltration Site Treatment Method  None 21 1800   Number of days: 1     GENERAL: Alert. NAD. Conversational.   HEENT: Normocephalic. No icterus or injection. Nares normal.   LUNGS: Clear to auscultation. No dyspnea at rest.   HEART: Regular rate. Extremities perfused.   ABDOMEN: Soft, tender. Hypoactive bowel sounds. Oneil present.   EXTREMITIES: No LE edema noted.   NEUROLOGIC: Moves extremities x4, follows commands.          Prior to  Admission Medications:        Medications Prior to Admission   Medication Sig Dispense Refill Last Dose     amLODIPine (NORVASC) 10 MG tablet [AMLODIPINE (NORVASC) 10 MG TABLET] Take 1 tablet (10 mg total) by mouth daily. As directed 90 tablet 1 7/29/2021 at am     aspirin (ASA) 325 MG EC tablet Take 325 mg by mouth daily   Past Week at Unknown time     atorvastatin (LIPITOR) 80 MG tablet [ATORVASTATIN (LIPITOR) 80 MG TABLET] TAKE 1 TABLET(80 MG) BY MOUTH DAILY 90 tablet 3 7/29/2021 at am     blood glucose test strips [BLOOD GLUCOSE TEST STRIPS] Use 1 each As Directed 3 (three) times a day. 300 strip 3      calcium carbonate (OS-EDER) 500 MG tablet Take 1 tablet by mouth daily   Past Week at Unknown time     CRANBERRY EXTRACT (CRANBERRY ORAL) [CRANBERRY EXTRACT (CRANBERRY ORAL)] Take by mouth.   Past Week at Unknown time     cyanocobalamin (VITAMIN B-12) 100 MCG tablet Take 100 mcg by mouth daily   Past Week at Unknown time     dulaglutide (TRULICITY) 1.5 mg/0.5 mL PnIj [DULAGLUTIDE (TRULICITY) 1.5 MG/0.5 ML PNIJ] Inject 1.5 mg under the skin every 7 days. 30 mL 0 7/25/2021     ferrous sulfate 325 (65 FE) MG tablet [FERROUS SULFATE 325 (65 FE) MG TABLET] Take 1 tablet (325 mg total) by mouth daily. 30 tablet 0 Past Week at Unknown time     glipiZIDE (GLUCOTROL XL) 10 MG 24 hr tablet [GLIPIZIDE (GLUCOTROL XL) 10 MG 24 HR TABLET] Take 1 tablet (10 mg total) by mouth daily. 90 tablet 1 7/29/2021 at am     lisinopriL (PRINIVIL,ZESTRIL) 20 MG tablet [LISINOPRIL (PRINIVIL,ZESTRIL) 20 MG TABLET] Take 1 tablet (20 mg total) by mouth daily. 90 tablet 1 7/29/2021 at am     metFORMIN (GLUCOPHAGE) 500 MG tablet [METFORMIN (GLUCOPHAGE) 500 MG TABLET] TAKE 2 TABLETS(1000 MG) BY MOUTH TWICE DAILY WITH MEALS 360 tablet 0 7/29/2021 at am     MULTIVITAMIN ORAL Take 1 tablet by mouth daily    Past Week at Unknown time     tamsulosin (FLOMAX) 0.4 mg cap [TAMSULOSIN (FLOMAX) 0.4 MG CAP] TAKE 1 CAPSULE(0.4 MG) BY MOUTH DAILY AFTER  BREAKFAST 90 capsule 3 7/30/2021 at am     Vitamin D3 (CHOLECALCIFEROL) 125 MCG (5000 UT) tablet Take 125 mcg by mouth daily   Past Week at Unknown time            Medications:        Current Facility-Administered Medications   Medication Last Rate     lactated ringers 75 mL/hr at 07/31/21 0656     Current Facility-Administered Medications   Medication Dose Route Frequency     acetaminophen  975 mg Oral Q8H     aspirin  81 mg Oral Daily     atorvastatin  80 mg Oral Daily     enoxaparin ANTICOAGULANT  40 mg Subcutaneous Q24H     insulin aspart  1-7 Units Subcutaneous TID AC     insulin aspart  1-5 Units Subcutaneous At Bedtime     sodium chloride (PF)  3 mL Intracatheter Q8H     tamsulosin  0.4 mg Oral Daily     Current Facility-Administered Medications   Medication Dose Route Frequency     [START ON 8/2/2021] acetaminophen  650 mg Oral Q4H PRN     glucose  15-30 g Oral Q15 Min PRN    Or     dextrose  25-50 mL Intravenous Q15 Min PRN    Or     glucagon  1 mg Subcutaneous Q15 Min PRN     glucose  15-30 g Oral Q15 Min PRN    Or     dextrose  25-50 mL Intravenous Q15 Min PRN    Or     glucagon  1 mg Subcutaneous Q15 Min PRN     HYDROmorphone  0.2 mg Intravenous Q2H PRN    Or     HYDROmorphone  0.4 mg Intravenous Q2H PRN     lidocaine 4%   Topical Q1H PRN     lidocaine (buffered or not buffered)  0.1-1 mL Other Q1H PRN     naloxone  0.2 mg Intravenous Q2 Min PRN    Or     naloxone  0.4 mg Intravenous Q2 Min PRN    Or     naloxone  0.2 mg Intramuscular Q2 Min PRN    Or     naloxone  0.4 mg Intramuscular Q2 Min PRN     ondansetron  4 mg Oral Q6H PRN    Or     ondansetron  4 mg Intravenous Q6H PRN     sodium chloride (PF)  3 mL Intracatheter q1 min prn     ___________________________________________________________________________  Medications     lactated ringers 75 mL/hr at 07/31/21 0656       acetaminophen  975 mg Oral Q8H     aspirin  81 mg Oral Daily     atorvastatin  80 mg Oral Daily     enoxaparin ANTICOAGULANT  40  mg Subcutaneous Q24H     insulin aspart  1-7 Units Subcutaneous TID AC     insulin aspart  1-5 Units Subcutaneous At Bedtime     sodium chloride (PF)  3 mL Intracatheter Q8H     tamsulosin  0.4 mg Oral Daily          Data:      Lab data reviewed.     Data   Recent Labs   Lab 07/31/21  0525 07/31/21  0119 07/30/21  2107 07/30/21  1300 07/29/21  0901   WBC  --   --   --   --  6.5   HGB  --   --   --  9.1* 7.3*   MCV  --   --   --   --  71*   PLT  --   --   --   --  427   * 204* 230*  --   --            Imaging:      Imaging data reviewed.     No results found for this or any previous visit (from the past 24 hour(s)).    Dr. Steve Escalante DO, [place]  [facility]  Pager [phone]

## 2021-07-31 NOTE — PROGRESS NOTES
Colon and Rectal Surgery  Daily Progress Note    Subjective  No acute events overnight. Pain well controlled. Denies n/v. No bowel function yet.    Objective  Intake/Output last 24 hrs:    Intake/Output Summary (Last 24 hours) at 7/31/2021 1003  Last data filed at 7/31/2021 0842  Gross per 24 hour   Intake 3682 ml   Output 2770 ml   Net 912 ml     Temp:  [97.5  F (36.4  C)-98.7  F (37.1  C)] 98  F (36.7  C)  Pulse:  [] 85  Resp:  [8-22] 18  BP: (116-130)/(59-71) 129/71  SpO2:  [94 %-100 %] 96 %    Physical Exam:  A&Ox3, NAD  Respirations unlabored  Abdomen soft, non distended, appropriately tender, incisions c/d/i    Pertinent Labs  Lab Results: personally reviewed.  Lab Results   Component Value Date     07/31/2021     07/21/2021     12/18/2020    CO2 24 07/31/2021    CO2 23 07/21/2021    CO2 23 12/18/2020    BUN 6 07/31/2021    BUN 20 07/21/2021    BUN 17 12/18/2020     Lab Results   Component Value Date    WBC 12.0 07/31/2021    WBC 6.5 07/29/2021    WBC 6.5 07/21/2021    HGB 8.4 07/31/2021    HGB 9.1 07/30/2021    HGB 7.3 07/29/2021    HCT 28.7 07/31/2021    HCT 25.5 07/29/2021    HCT 24.6 07/21/2021    MCV 73 07/31/2021    MCV 71 07/29/2021    MCV 71 07/21/2021     07/31/2021     07/29/2021     07/21/2021       Assessment/Plan: This is a 71 year old male POD #1 s/p lap converted open right colectomy  -clears  -home meds as appropriate  -carey out tomorrow, cont today given large prostate, on home flomax  -MIVF  -insulin sliding scale  -ambulation  -lovenox    Ana Dorsey MD  [facility]  [phone]..............................main

## 2021-07-31 NOTE — ANESTHESIA POSTPROCEDURE EVALUATION
Patient: Regis JONES Edwardo    Procedure(s):  LAPAROSCOPIC  OPEN RIGHT COLECTOMY  CYSTOSCOPY, RIGHT STENT AND URETERAL CATHETERIZATION    Diagnosis:Malignant neoplasm of ascending colon (H) [C18.2]  Diagnosis Additional Information: No value filed.    Anesthesia Type:  General    Note:  Disposition: Admission   Postop Pain Control: Uneventful            Sign Out: Well controlled pain   PONV: No   Neuro/Psych: Uneventful            Sign Out: Acceptable/Baseline neuro status   Airway/Respiratory: Uneventful            Sign Out: Acceptable/Baseline resp. status   CV/Hemodynamics: Uneventful            Sign Out: Acceptable CV status; No obvious hypovolemia; No obvious fluid overload   Other NRE: NONE   DID A NON-ROUTINE EVENT OCCUR? No           Last vitals:  Vitals Value Taken Time   /62 07/30/21 1845   Temp 37.1  C (98.7  F) 07/30/21 1756   Pulse 91 07/30/21 1859   Resp 16 07/30/21 1859   SpO2 97 % 07/30/21 1859   Vitals shown include unvalidated device data.    Electronically Signed By: Kasandra Deleon MD  July 30, 2021  11:33 PM

## 2021-07-31 NOTE — PROGRESS NOTES
[facility]    Medicine Progress Note - Hospitalist Service       Date of Admission:  7/30/2021    Assessment & Plan           Regis Brooke is a 71 year old male admitted on 7/30/2021. He has history of colon cancer, microcytic anemia, type 2 diabetes, essential hypertension, dyslipidemia, cognitive deficit, renal cell carcinoma, kidney stones, vitamin D deficiency.  [facility] consulted by colorectal surgery team for postop medical management.  Preop H&P reviewed.  Patient is status post 2 units PRBCs transfusion on July 29, 2021.    1.  Colon cancer  Status post right colectomy  Postoperative pain management, DVT prophylaxis and weightbearing status as per primary surgery service    2.  Type 2 diabetes mellitus without complications without long-term insulin  Continue medium insulin resistant sliding scale every 4 hours while on clear liquids  PTA glipizide, Metformin on hold    3.  Essential hypertension  Normotensive postop  PTA amlodipine, lisinopril on hold    4.  Preop anemia  Status post 2 unit PRBCs transfusion preop on July 29, 2021  Monitor hemoglobin  Resume ferrous sulfate when taking by mouth well    5.  BPH  On tamsulosin    6.  Hematuria  Mark looking urine in the Oneil catheter bag, ureteral stent removed at the end of the case  Continue IV hydration and monitor    7.  Dyslipidemia  On atorvastatin    8.  Cognitive deficits  Hard of hearing  At risk for acute delirium  Monitor closely       Diet: Clear Liquid Diet    DVT Prophylaxis: Enoxaparin (Lovenox) SQ  Oneil Catheter: PRESENT, indication:    Central Lines: None  Code Status: Full Code      Disposition Plan   Expected discharge: To be determined recommended to To be determined once adequate pain management/ tolerating PO medications.     The patient's care was discussed with the Bedside Nurse.    Alexx Hollins MD  Hospitalist Service  [facility]  Securely message with the Vocera Web Console (learn  more here)  Text page via [facility] Paging/Directory      Clinically Significant Risk Factors Present on Admission              # Platelet Defect: home medication list includes an antiplatelet medication      ______________________________________________________________________    Interval History   71 years old male with microcytic anemia, type 2 diabetes, hypertension, dyslipidemia, BPH, colon cancer admitted for right colectomy.  Doing well postoperatively with pain rated 2 out of 10.  Denies chest pain, shortness of breath or palpitations.    Data reviewed today: I reviewed all medications, new labs and imaging results over the last 24 hours. I personally reviewed no images or EKG's today.    Physical Exam   Vital Signs: Temp: 97.5  F (36.4  C) Temp src: Oral BP: 123/63 Pulse: 95   Resp: 20 SpO2: 95 % O2 Device: None (Room air) Oxygen Delivery: 8 LPM  Weight: 199 lbs 9.6 oz  General: In no acute distress  Heart: Regular rate and rhythm  Lungs: Clear to auscultation  Abdomen: Hypoactive  Extremities: SCDs in place, no calf tenderness, no edema  Skin: Warm, dry  Neuro: Awake, alert    Data   Recent Labs   Lab 07/30/21  2107 07/30/21  1817 07/30/21  1300 07/30/21  1258 07/29/21  0901   WBC  --   --   --   --  6.5   HGB  --   --  9.1*  --  7.3*   MCV  --   --   --   --  71*   PLT  --   --   --   --  427   * 249*  --  113  --      No results found for this or any previous visit (from the past 24 hour(s)).  Medications     lactated ringers 75 mL/hr at 07/30/21 2109       acetaminophen  975 mg Oral Q8H     [START ON 7/31/2021] aspirin  81 mg Oral Daily     [START ON 7/31/2021] atorvastatin  80 mg Oral Daily     [START ON 7/31/2021] enoxaparin ANTICOAGULANT  40 mg Subcutaneous Q24H     insulin aspart  1-6 Units Subcutaneous Q4H     sodium chloride (PF)  3 mL Intracatheter Q8H     [START ON 7/31/2021] tamsulosin  0.4 mg Oral Daily

## 2021-07-31 NOTE — PLAN OF CARE
Problem: Risk for Delirium  Goal: Improved Sleep  Outcome: Improving  Patient reports that he slept well after PRN interventions for pain     Problem: Bowel Motility Impaired (Bowel Resection)  Goal: Effective Bowel Motility and Elimination  Outcome: Improving  Bowel sounds present but hypoactive, not passing flatus yet. Tolerating clear liquids     Problem: Pain (Bowel Resection)  Goal: Acceptable Pain Control  Outcome: Improving  Intervention: Prevent or Manage Pain  Recent Flowsheet Documentation  Taken 7/31/2021 0151 by Julio Chand, RN  Pain Management Interventions:   medication (see MAR)   cold applied   emotional support   rest    Patient reports that pain has mostly remained unchanged but did improve enough for him to be able to sleep after PRN pain meds    Julio Chand, RN

## 2021-07-31 NOTE — OR NURSING
Dr Deleon updated on accu check 249, states will look at meds and order insulin, see orders and MAR

## 2021-08-01 LAB
ERYTHROCYTE [DISTWIDTH] IN BLOOD BY AUTOMATED COUNT: 18.6 % (ref 10–15)
GLUCOSE BLDC GLUCOMTR-MCNC: 113 MG/DL (ref 70–125)
GLUCOSE BLDC GLUCOMTR-MCNC: 115 MG/DL (ref 70–125)
GLUCOSE BLDC GLUCOMTR-MCNC: 155 MG/DL (ref 70–125)
GLUCOSE BLDC GLUCOMTR-MCNC: 157 MG/DL (ref 70–125)
GLUCOSE BLDC GLUCOMTR-MCNC: 99 MG/DL (ref 70–125)
HCT VFR BLD AUTO: 32.3 % (ref 40–53)
HGB BLD-MCNC: 9.4 G/DL (ref 13.3–17.7)
MCH RBC QN AUTO: 21.5 PG (ref 26.5–33)
MCHC RBC AUTO-ENTMCNC: 29.1 G/DL (ref 31.5–36.5)
MCV RBC AUTO: 74 FL (ref 78–100)
PLATELET # BLD AUTO: 395 10E3/UL (ref 150–450)
RBC # BLD AUTO: 4.38 10E6/UL (ref 4.4–5.9)
WBC # BLD AUTO: 8.8 10E3/UL (ref 4–11)

## 2021-08-01 PROCEDURE — 120N000001 HC R&B MED SURG/OB

## 2021-08-01 PROCEDURE — 99231 SBSQ HOSP IP/OBS SF/LOW 25: CPT | Performed by: HOSPITALIST

## 2021-08-01 PROCEDURE — 36415 COLL VENOUS BLD VENIPUNCTURE: CPT | Performed by: HOSPITALIST

## 2021-08-01 PROCEDURE — 250N000013 HC RX MED GY IP 250 OP 250 PS 637: Performed by: COLON & RECTAL SURGERY

## 2021-08-01 PROCEDURE — 250N000011 HC RX IP 250 OP 636: Performed by: COLON & RECTAL SURGERY

## 2021-08-01 PROCEDURE — 85027 COMPLETE CBC AUTOMATED: CPT | Performed by: HOSPITALIST

## 2021-08-01 PROCEDURE — 250N000013 HC RX MED GY IP 250 OP 250 PS 637: Performed by: HOSPITALIST

## 2021-08-01 RX ORDER — AMLODIPINE BESYLATE 5 MG/1
10 TABLET ORAL DAILY
Status: DISCONTINUED | OUTPATIENT
Start: 2021-08-01 | End: 2021-08-02 | Stop reason: HOSPADM

## 2021-08-01 RX ADMIN — ASPIRIN 81 MG: 81 TABLET, CHEWABLE ORAL at 08:25

## 2021-08-01 RX ADMIN — TAMSULOSIN HYDROCHLORIDE 0.4 MG: 0.4 CAPSULE ORAL at 08:25

## 2021-08-01 RX ADMIN — ACETAMINOPHEN 975 MG: 325 TABLET ORAL at 12:04

## 2021-08-01 RX ADMIN — ENOXAPARIN SODIUM 40 MG: 100 INJECTION SUBCUTANEOUS at 12:04

## 2021-08-01 RX ADMIN — ACETAMINOPHEN 975 MG: 325 TABLET ORAL at 03:11

## 2021-08-01 RX ADMIN — ACETAMINOPHEN 975 MG: 325 TABLET ORAL at 19:59

## 2021-08-01 RX ADMIN — AMLODIPINE BESYLATE 10 MG: 5 TABLET ORAL at 12:07

## 2021-08-01 RX ADMIN — ATORVASTATIN CALCIUM 80 MG: 40 TABLET, FILM COATED ORAL at 08:25

## 2021-08-01 NOTE — PLAN OF CARE
"Shift from 0700 to 1930-    Problem: Pain Acute  Goal: Acceptable Pain Control and Functional Ability  Outcome: Improving  Intervention: Develop Pain Management Plan  Recent Flowsheet Documentation  Taken 8/1/2021 1204 by Carrie Bunch, RN  Pain Management Interventions:    medication (see MAR)    declines    emotional support  Taken 8/1/2021 0812 by Carrie Bunch, RN  Pain Management Interventions:    medication (see MAR)    declines    emotional support  Pain controlled with scheduled tylenol; Patient walking every 2-3 hrs; Using IS. Tolerated clears and was advanced to full liquid diet via surgeon; Tolerated full liquid diet;active bowel sounds;   Had two BM's and passed flatus on evening. IVF was Sl'd; Oneil DC'd today at 1410. Pt refused bladder scan on eves; Feels no urge to void and \"wants to try at bedtime again\"; Oncoming RN will talk to patient about scan.   "

## 2021-08-01 NOTE — PROGRESS NOTES
COLON & RECTAL SURGERY PROGRESS NOTE  Colon/Rectal Surgery Fellow  POD#2 s/p lap->open right hemicolectomy    SUBJECTIVE:  Feels well.  Pain is minimal and just using tylenol.  Denies flatus/BM.  Denies nausea.    VITALS:  B/P: 130/76, T: 98.9, P: 103, R: 20  Patient Vitals for the past 24 hrs:   BP Temp Temp src Pulse Resp SpO2   08/01/21 1103 130/76 98.9  F (37.2  C) Oral 103 20 98 %   08/01/21 0723 (!) 142/79 98.4  F (36.9  C) Oral 88 18 96 %   08/01/21 0053 129/70 98.3  F (36.8  C) Oral 86 16 94 %   07/31/21 1925 137/78 98.6  F (37  C) Oral 95 16 96 %   07/31/21 1540 (!) 143/70 98.1  F (36.7  C) Oral 83 16 98 %       Intake/Output Summary (Last 24 hours) at 8/1/2021 1237  Last data filed at 8/1/2021 0934  Gross per 24 hour   Intake 3674 ml   Output 3300 ml   Net 374 ml       EXAM:  General Appearance:  A+Ox3, NAD  Abdomen: Soft, appropriately tender, mild distention, inc c/d/i     RECENT LABS:  Recent Labs   Lab Test 08/01/21  0746 07/31/21  0758   WBC 8.8 12.0*   RBC 4.38* 3.93*   HGB 9.4* 8.4*   HCT 32.3* 28.7*   MCV 74* 73*   MCH 21.5* 21.4*   MCHC 29.1* 29.3*    385     Recent Labs   Lab Test 07/31/21  0758 07/21/21  1517 12/18/20  1421   POTASSIUM 3.7 4.5 4.0   CHLORIDE 105 106 103   BUN 6* 20 17     No lab results found.    ASSESSMENT AND PLAN: 70 y/o gentleman POD#2 s/p lap->open right hemicolectomy    - adv to full liquids  - minimize IVF  - discharge carey  - OOB amb  - encourage IS 10xQ1h  - awaiting SUE Sheikh MD ....................  8/1/2021   12:37 PM  Colon and Rectal Surgery Fellow  [phone]

## 2021-08-01 NOTE — PLAN OF CARE
Problem: Bowel Motility Impaired (Bowel Resection)  Goal: Effective Bowel Motility and Elimination  Outcome: Improving  Patient has positive bowel sounds but has not passed gas overnight     Problem: Postoperative Nausea and Vomiting (Bowel Resection)  Goal: Nausea and Vomiting Relief  Outcome: Improving  Denies any nausea when asked.     Problem: Pain Acute  Goal: Acceptable Pain Control and Functional Ability  Outcome: Improving  Reports that pain is well controlled with scheduled Tylenol.    Patient slightly disoriented upon waking but easily reoriented. Ambulated in the hallway overnight. Tolerated well.    Julio Chand RN

## 2021-08-01 NOTE — PROGRESS NOTES
"[facility]  Hospitalist Progress Note  [facility]        Date of Service (when I saw the patient): 08/01/2021  Steve Escalante,   08/01/2021        Interval History:      Patient states he is doing well, discussed plan of care, verbalized understanding.          Assessment and Plan:        Regis Brooke is a 71 year old male admitted on 7/30/2021. He has history of colon cancer, microcytic anemia, type 2 diabetes, essential hypertension, dyslipidemia, cognitive deficit, renal cell carcinoma, kidney stones, vitamin D deficiency.  [facility] consulted by colorectal surgery team for postop medical management.  Preop H&P reviewed.  Patient is status post 2 units PRBCs transfusion on July 29, 2021.     Colon cancer Status post right colectomy  - Postoperative pain management, DVT prophylaxis and weightbearing status as per primary surgery service     Type 2 diabetes mellitus without complications without long-term insulin  - insulin sliding scale  - Hold PTA glipizide, Metformin      Essential hypertension Normotensive postop  - PTA amlodipine with hold parameters.   - Hold pta lisinopril      Preop anemia Status post 2 unit PRBCs transfusion preop on July 29, 2021  - Monitor      BPH  - tamsulosin     Hematuria Mark looking urine in the Oneil catheter bag, ureteral stent removed at the end of the case, per report.   - Monitor.      Dyslipidemia  - atorvastatin     Cognitive deficits Hard of hearing  - At risk for acute delirium  - Monitor closely     DVT Prophylaxis: Defer to Surgery.      Disposition: Defer to Surgery.     Diet: Orders Placed This Encounter      Clear Liquid Diet    Code: Full Code    Length of Stay:  LOS: 2 days /LOS: 2                   Physical Exam:           Blood pressure (!) 142/79, pulse 88, temperature 98.4  F (36.9  C), temperature source Oral, resp. rate 18, height 1.753 m (5' 9\"), weight 90.1 kg (198 lb 11.2 oz), SpO2 96 %.    Vital Sign " Ranges  Temperature Temp  Av.2  F (36.8  C)  Min: 97.6  F (36.4  C)  Max: 98.6  F (37  C)   Blood pressure Systolic (24hrs), Av , Min:129 , Max:143        Diastolic (24hrs), Av, Min:70, Max:79      Pulse Pulse  Av.5  Min: 76  Max: 95   Respirations Resp  Av  Min: 16  Max: 18   Pulse oximetry SpO2  Av.3 %  Min: 94 %  Max: 98 %     Vital Signs with Ranges  Temp:  [97.6  F (36.4  C)-98.6  F (37  C)] 98.4  F (36.9  C)  Pulse:  [76-95] 88  Resp:  [16-18] 18  BP: (129-143)/(70-79) 142/79  SpO2:  [94 %-98 %] 96 %  Temp:  [97.6  F (36.4  C)-98.6  F (37  C)] 98.4  F (36.9  C)  Pulse:  [76-95] 88  Resp:  [16-18] 18  BP: (129-143)/(70-79) 142/79  SpO2:  [94 %-98 %] 96 %    I/O Last 3 Shifts:   I/O last 3 completed shifts:  In: 3434 [P.O.:1680; I.V.:1754]  Out: 3750 [Urine:3750]    I/O past 24 hours:     Intake/Output Summary (Last 24 hours) at 2021 0734  Last data filed at 2021 0700  Gross per 24 hour   Intake 3434 ml   Output 4200 ml   Net -766 ml       Oxygen  Temp: 98.4  F (36.9  C) Temp src: Oral BP: (!) 142/79 (notify nurse) Pulse: 88   Resp: 18 SpO2: 96 % O2 Device: None (Room air)    Vitals:    21 1114 21 1914 21 1114   Weight: 89 kg (196 lb 1.6 oz) 90.5 kg (199 lb 9.6 oz) 90.1 kg (198 lb 11.2 oz)       Lines  Peripheral IV 21 Anterior;Right Lower forearm (Active)   Site Assessment WDL 21   Line Status Infusing;Checked every 1 hour 21   Phlebitis Scale 0-->no symptoms 21   Infiltration Scale 0 21   Infiltration Site Treatment Method  None 21   Number of days: 2     GENERAL: Alert. NAD. Conversational. Pleasant.   HEENT: Normocephalic. No icterus or injection. Nares normal.   LUNGS: Clear to auscultation. No dyspnea at rest.   HEART: Regular rate. Extremities perfused.   ABDOMEN: Soft, tender. Hypoactive bowel sounds. Oneil present.   EXTREMITIES: No LE edema noted.   NEUROLOGIC: Moves extremities x4,  follows commands.          Prior to Admission Medications:        Medications Prior to Admission   Medication Sig Dispense Refill Last Dose     amLODIPine (NORVASC) 10 MG tablet [AMLODIPINE (NORVASC) 10 MG TABLET] Take 1 tablet (10 mg total) by mouth daily. As directed 90 tablet 1 7/29/2021 at am     aspirin (ASA) 325 MG EC tablet Take 325 mg by mouth daily   Past Week at Unknown time     atorvastatin (LIPITOR) 80 MG tablet [ATORVASTATIN (LIPITOR) 80 MG TABLET] TAKE 1 TABLET(80 MG) BY MOUTH DAILY 90 tablet 3 7/29/2021 at am     blood glucose test strips [BLOOD GLUCOSE TEST STRIPS] Use 1 each As Directed 3 (three) times a day. 300 strip 3      calcium carbonate (OS-EDER) 500 MG tablet Take 1 tablet by mouth daily   Past Week at Unknown time     CRANBERRY EXTRACT (CRANBERRY ORAL) [CRANBERRY EXTRACT (CRANBERRY ORAL)] Take by mouth.   Past Week at Unknown time     cyanocobalamin (VITAMIN B-12) 100 MCG tablet Take 100 mcg by mouth daily   Past Week at Unknown time     dulaglutide (TRULICITY) 1.5 mg/0.5 mL PnIj [DULAGLUTIDE (TRULICITY) 1.5 MG/0.5 ML PNIJ] Inject 1.5 mg under the skin every 7 days. 30 mL 0 7/25/2021     ferrous sulfate 325 (65 FE) MG tablet [FERROUS SULFATE 325 (65 FE) MG TABLET] Take 1 tablet (325 mg total) by mouth daily. 30 tablet 0 Past Week at Unknown time     glipiZIDE (GLUCOTROL XL) 10 MG 24 hr tablet [GLIPIZIDE (GLUCOTROL XL) 10 MG 24 HR TABLET] Take 1 tablet (10 mg total) by mouth daily. 90 tablet 1 7/29/2021 at am     lisinopriL (PRINIVIL,ZESTRIL) 20 MG tablet [LISINOPRIL (PRINIVIL,ZESTRIL) 20 MG TABLET] Take 1 tablet (20 mg total) by mouth daily. 90 tablet 1 7/29/2021 at am     metFORMIN (GLUCOPHAGE) 500 MG tablet [METFORMIN (GLUCOPHAGE) 500 MG TABLET] TAKE 2 TABLETS(1000 MG) BY MOUTH TWICE DAILY WITH MEALS 360 tablet 0 7/29/2021 at am     MULTIVITAMIN ORAL Take 1 tablet by mouth daily    Past Week at Unknown time     tamsulosin (FLOMAX) 0.4 mg cap [TAMSULOSIN (FLOMAX) 0.4 MG CAP] TAKE 1  CAPSULE(0.4 MG) BY MOUTH DAILY AFTER BREAKFAST 90 capsule 3 7/30/2021 at am     Vitamin D3 (CHOLECALCIFEROL) 125 MCG (5000 UT) tablet Take 125 mcg by mouth daily   Past Week at Unknown time            Medications:        Current Facility-Administered Medications   Medication Last Rate     lactated ringers 75 mL/hr at 07/31/21 2107     Current Facility-Administered Medications   Medication Dose Route Frequency     acetaminophen  975 mg Oral Q8H     aspirin  81 mg Oral Daily     atorvastatin  80 mg Oral Daily     enoxaparin ANTICOAGULANT  40 mg Subcutaneous Q24H     insulin aspart  1-7 Units Subcutaneous TID AC     insulin aspart  1-5 Units Subcutaneous At Bedtime     sodium chloride (PF)  3 mL Intracatheter Q8H     tamsulosin  0.4 mg Oral Daily     Current Facility-Administered Medications   Medication Dose Route Frequency     [START ON 8/2/2021] acetaminophen  650 mg Oral Q4H PRN     glucose  15-30 g Oral Q15 Min PRN    Or     dextrose  25-50 mL Intravenous Q15 Min PRN    Or     glucagon  1 mg Subcutaneous Q15 Min PRN     glucose  15-30 g Oral Q15 Min PRN    Or     dextrose  25-50 mL Intravenous Q15 Min PRN    Or     glucagon  1 mg Subcutaneous Q15 Min PRN     HYDROmorphone  0.2 mg Intravenous Q2H PRN    Or     HYDROmorphone  0.4 mg Intravenous Q2H PRN     lidocaine 4%   Topical Q1H PRN     lidocaine (buffered or not buffered)  0.1-1 mL Other Q1H PRN     naloxone  0.2 mg Intravenous Q2 Min PRN    Or     naloxone  0.4 mg Intravenous Q2 Min PRN    Or     naloxone  0.2 mg Intramuscular Q2 Min PRN    Or     naloxone  0.4 mg Intramuscular Q2 Min PRN     ondansetron  4 mg Oral Q6H PRN    Or     ondansetron  4 mg Intravenous Q6H PRN     sodium chloride (PF)  3 mL Intracatheter q1 min prn     ___________________________________________________________________________  Medications     lactated ringers 75 mL/hr at 07/31/21 2107       acetaminophen  975 mg Oral Q8H     aspirin  81 mg Oral Daily     atorvastatin  80 mg Oral  Daily     enoxaparin ANTICOAGULANT  40 mg Subcutaneous Q24H     insulin aspart  1-7 Units Subcutaneous TID AC     insulin aspart  1-5 Units Subcutaneous At Bedtime     sodium chloride (PF)  3 mL Intracatheter Q8H     tamsulosin  0.4 mg Oral Daily          Data:      Lab data reviewed.     Data   Recent Labs   Lab 08/01/21  0614 07/31/21 2052 07/31/21  1712 07/31/21  0758 07/30/21  1300 07/29/21  0901   WBC  --   --   --  12.0*  --  6.5   HGB  --   --   --  8.4* 9.1* 7.3*   MCV  --   --   --  73*  --  71*   PLT  --   --   --  385  --  427   NA  --   --   --  137  --   --    POTASSIUM  --   --   --  3.7  --   --    CHLORIDE  --   --   --  105  --   --    CO2  --   --   --  24  --   --    BUN  --   --   --  6*  --   --    CR  --   --   --  0.60*  --   --    ANIONGAP  --   --   --  8  --   --    EDER  --   --   --  8.4*  --   --     128* 104 132*  --   --            Imaging:      Imaging data reviewed.     No results found for this or any previous visit (from the past 24 hour(s)).    Dr. Steev Escalante DO, [place]  [facility]  Pager [phone]

## 2021-08-02 VITALS
SYSTOLIC BLOOD PRESSURE: 128 MMHG | OXYGEN SATURATION: 98 % | HEIGHT: 69 IN | RESPIRATION RATE: 18 BRPM | TEMPERATURE: 98.2 F | DIASTOLIC BLOOD PRESSURE: 74 MMHG | WEIGHT: 198.7 LBS | HEART RATE: 97 BPM | BODY MASS INDEX: 29.43 KG/M2

## 2021-08-02 LAB
ANION GAP SERPL CALCULATED.3IONS-SCNC: 9 MMOL/L (ref 5–18)
BUN SERPL-MCNC: 6 MG/DL (ref 8–28)
CALCIUM SERPL-MCNC: 8.7 MG/DL (ref 8.5–10.5)
CHLORIDE BLD-SCNC: 105 MMOL/L (ref 98–107)
CO2 SERPL-SCNC: 24 MMOL/L (ref 22–31)
CREAT SERPL-MCNC: 0.65 MG/DL (ref 0.7–1.3)
GFR SERPL CREATININE-BSD FRML MDRD: >90 ML/MIN/1.73M2
GLUCOSE BLD-MCNC: 107 MG/DL (ref 70–125)
GLUCOSE BLDC GLUCOMTR-MCNC: 109 MG/DL (ref 70–125)
GLUCOSE BLDC GLUCOMTR-MCNC: 116 MG/DL (ref 70–125)
POTASSIUM BLD-SCNC: 3.7 MMOL/L (ref 3.5–5)
SODIUM SERPL-SCNC: 138 MMOL/L (ref 136–145)

## 2021-08-02 PROCEDURE — 250N000013 HC RX MED GY IP 250 OP 250 PS 637: Performed by: HOSPITALIST

## 2021-08-02 PROCEDURE — 36415 COLL VENOUS BLD VENIPUNCTURE: CPT | Performed by: HOSPITALIST

## 2021-08-02 PROCEDURE — 99207 PR CDG-MDM COMPONENT: MEETS MODERATE - DOWN CODED: CPT | Performed by: HOSPITALIST

## 2021-08-02 PROCEDURE — 80048 BASIC METABOLIC PNL TOTAL CA: CPT | Performed by: HOSPITALIST

## 2021-08-02 PROCEDURE — 250N000013 HC RX MED GY IP 250 OP 250 PS 637: Performed by: COLON & RECTAL SURGERY

## 2021-08-02 PROCEDURE — 250N000011 HC RX IP 250 OP 636: Performed by: COLON & RECTAL SURGERY

## 2021-08-02 PROCEDURE — 99232 SBSQ HOSP IP/OBS MODERATE 35: CPT | Performed by: HOSPITALIST

## 2021-08-02 RX ORDER — FERROUS SULFATE 325(65) MG
325 TABLET ORAL DAILY
Status: DISCONTINUED | OUTPATIENT
Start: 2021-08-02 | End: 2021-08-02 | Stop reason: HOSPADM

## 2021-08-02 RX ADMIN — FERROUS SULFATE TAB 325 MG (65 MG ELEMENTAL FE) 325 MG: 325 (65 FE) TAB at 11:57

## 2021-08-02 RX ADMIN — TAMSULOSIN HYDROCHLORIDE 0.4 MG: 0.4 CAPSULE ORAL at 08:27

## 2021-08-02 RX ADMIN — AMLODIPINE BESYLATE 10 MG: 5 TABLET ORAL at 08:26

## 2021-08-02 RX ADMIN — ACETAMINOPHEN 975 MG: 325 TABLET ORAL at 14:09

## 2021-08-02 RX ADMIN — ACETAMINOPHEN 975 MG: 325 TABLET ORAL at 03:54

## 2021-08-02 RX ADMIN — ENOXAPARIN SODIUM 40 MG: 100 INJECTION SUBCUTANEOUS at 11:58

## 2021-08-02 RX ADMIN — ASPIRIN 81 MG: 81 TABLET, CHEWABLE ORAL at 08:26

## 2021-08-02 RX ADMIN — ATORVASTATIN CALCIUM 80 MG: 40 TABLET, FILM COATED ORAL at 08:27

## 2021-08-02 NOTE — PLAN OF CARE
Problem: Pain Acute  Goal: Acceptable Pain Control and Functional Ability  Intervention: Develop Pain Management Plan  Recent Flowsheet Documentation  Taken 8/2/2021 0354 by Marina Pavon RN  Pain Management Interventions: medication (see MAR)  Taken 8/2/2021 0015 by Marina Pavon, RN  Pain Management Interventions: (ice pack) cold applied     Mild abdominal pain controlled with scheduled Tylenol x1 and ice pack. Lap sites CDI with derma bond.  Pt states he is passing gas.  IS encouraged, pt able to reach 2500.   ---------------------------------------------------------------------------------------------------------------------------  Problem: Urinary Retention  Goal: Effective Urinary Elimination  Outcome: Improving    Pt able to void 75 ml of red urine @ 0000.  Urine has become tea colored/dark ramoan by the end of shift and able to void 650 ml more without difficulty. PIV SL, tolerated full liquid diet with no nausea.

## 2021-08-02 NOTE — PROGRESS NOTES
[facility]  Hospitalist Progress Note  [facility]        Date of Service (when I saw the patient): 08/02/2021  Maggie Cotton MD  08/02/2021        Interval History:        Patient is new to me. Chart reviewed and events noted.  Patient seen and examined.  Denies any abd pain. No N/V. Tolerating diet. Had BM.          Assessment and Plan:        Regis Brooke is a 71 year old male admitted on 7/30/2021. He has history of colon cancer, microcytic anemia, type 2 diabetes, essential hypertension, dyslipidemia, cognitive deficit, renal cell carcinoma, kidney stones, vitamin D deficiency.  [facility] consulted by colorectal surgery team for postop medical management.  Patient is status post 2 units PRBCs transfusion on July 29, 2021.     Colon cancer Status post right hemicolectomy on 7/30  - Postoperative pain management, DVT prophylaxis and weightbearing status as per primary surgery service     Type 2 diabetes mellitus without complications without long-term insulin  - insulin sliding scale  - Hold PTA glipizide, Metformin,Trulicity     Essential hypertension Normotensive postop  - On PTA amlodipine with hold parameters.   - Hold PTA lisinopril while in-patient     Preop anemia Status post 2 unit PRBCs transfusion preop on July 29, 2021  - Monitor, resume PTA ferrous sulfate.      BPH- tamsulosin     Hematuria Mark looking urine in the Oneil catheter bag.  -S/p cystoscopy and a ureteral stent placed as documented in notes on 8/2.  -Monitor Hb      Dyslipidemia  - Atorvastatin     Cognitive deficits Hard of hearing  - At risk for acute delirium  - Monitor closely     DVT Prophylaxis: On Lovenox per primary team      Disposition: Defer to Surgery.     Diet: Orders Placed This Encounter      Low Fiber Diet    Code: Full Code    Length of Stay:  LOS: 2 days /LOS: 3                   Physical Exam:           Blood pressure 133/84, pulse 81, temperature 98.1  F (36.7  C), temperature  "source Oral, resp. rate 18, height 1.753 m (5' 9\"), weight 90.1 kg (198 lb 11.2 oz), SpO2 96 %.    Vital Sign Ranges  Temperature Temp  Av.2  F (36.8  C)  Min: 97.6  F (36.4  C)  Max: 98.6  F (37  C)   Blood pressure Systolic (24hrs), Av , Min:129 , Max:143        Diastolic (24hrs), Av, Min:70, Max:79      Pulse Pulse  Av.5  Min: 76  Max: 95   Respirations Resp  Av  Min: 16  Max: 18   Pulse oximetry SpO2  Av.3 %  Min: 94 %  Max: 98 %     Vital Signs with Ranges  Temp:  [98  F (36.7  C)-98.9  F (37.2  C)] 98.1  F (36.7  C)  Pulse:  [] 81  Resp:  [16-20] 18  BP: (119-133)/(70-84) 133/84  SpO2:  [96 %-98 %] 96 %  Temp:  [98  F (36.7  C)-98.9  F (37.2  C)] 98.1  F (36.7  C)  Pulse:  [] 81  Resp:  [16-20] 18  BP: (119-133)/(70-84) 133/84  SpO2:  [96 %-98 %] 96 %    I/O Last 3 Shifts:   I/O last 3 completed shifts:  In: 1560 [P.O.:1560]  Out: 1475 [Urine:1475]    I/O past 24 hours:     Intake/Output Summary (Last 24 hours) at 2021 0734  Last data filed at 2021 0700  Gross per 24 hour   Intake 3434 ml   Output 4200 ml   Net -766 ml       Oxygen  Temp: 98.1  F (36.7  C) Temp src: Oral BP: 133/84 Pulse: 81   Resp: 18 SpO2: 96 % O2 Device: None (Room air)    Vitals:    21 1114 21 1914 21 1114   Weight: 89 kg (196 lb 1.6 oz) 90.5 kg (199 lb 9.6 oz) 90.1 kg (198 lb 11.2 oz)       Lines  Peripheral IV 21 Anterior;Right Lower forearm (Active)   Site Assessment WDL 21   Line Status Infusing;Checked every 1 hour 21   Phlebitis Scale 0-->no symptoms 21   Infiltration Scale 0 21   Infiltration Site Treatment Method  None 21   Number of days: 2     GENERAL: Alert. NAD. Conversational. Pleasant.   HEENT: Normocephalic. No icterus or injection. Nares normal.   LUNGS: Clear to auscultation. No dyspnea at rest.   HEART: Regular rate. Extremities perfused.   ABDOMEN: Soft.   EXTREMITIES: No LE edema noted. "   NEUROLOGIC: Moves extremities x4, follows commands.          Prior to Admission Medications:        Medications Prior to Admission   Medication Sig Dispense Refill Last Dose     amLODIPine (NORVASC) 10 MG tablet [AMLODIPINE (NORVASC) 10 MG TABLET] Take 1 tablet (10 mg total) by mouth daily. As directed 90 tablet 1 7/29/2021 at am     aspirin (ASA) 325 MG EC tablet Take 325 mg by mouth daily   Past Week at Unknown time     atorvastatin (LIPITOR) 80 MG tablet [ATORVASTATIN (LIPITOR) 80 MG TABLET] TAKE 1 TABLET(80 MG) BY MOUTH DAILY 90 tablet 3 7/29/2021 at am     blood glucose test strips [BLOOD GLUCOSE TEST STRIPS] Use 1 each As Directed 3 (three) times a day. 300 strip 3      calcium carbonate (OS-EDER) 500 MG tablet Take 1 tablet by mouth daily   Past Week at Unknown time     CRANBERRY EXTRACT (CRANBERRY ORAL) [CRANBERRY EXTRACT (CRANBERRY ORAL)] Take by mouth.   Past Week at Unknown time     cyanocobalamin (VITAMIN B-12) 100 MCG tablet Take 100 mcg by mouth daily   Past Week at Unknown time     dulaglutide (TRULICITY) 1.5 mg/0.5 mL PnIj [DULAGLUTIDE (TRULICITY) 1.5 MG/0.5 ML PNIJ] Inject 1.5 mg under the skin every 7 days. 30 mL 0 7/25/2021     ferrous sulfate 325 (65 FE) MG tablet [FERROUS SULFATE 325 (65 FE) MG TABLET] Take 1 tablet (325 mg total) by mouth daily. 30 tablet 0 Past Week at Unknown time     glipiZIDE (GLUCOTROL XL) 10 MG 24 hr tablet [GLIPIZIDE (GLUCOTROL XL) 10 MG 24 HR TABLET] Take 1 tablet (10 mg total) by mouth daily. 90 tablet 1 7/29/2021 at am     lisinopriL (PRINIVIL,ZESTRIL) 20 MG tablet [LISINOPRIL (PRINIVIL,ZESTRIL) 20 MG TABLET] Take 1 tablet (20 mg total) by mouth daily. 90 tablet 1 7/29/2021 at am     metFORMIN (GLUCOPHAGE) 500 MG tablet [METFORMIN (GLUCOPHAGE) 500 MG TABLET] TAKE 2 TABLETS(1000 MG) BY MOUTH TWICE DAILY WITH MEALS 360 tablet 0 7/29/2021 at am     MULTIVITAMIN ORAL Take 1 tablet by mouth daily    Past Week at Unknown time     tamsulosin (FLOMAX) 0.4 mg cap [TAMSULOSIN  (FLOMAX) 0.4 MG CAP] TAKE 1 CAPSULE(0.4 MG) BY MOUTH DAILY AFTER BREAKFAST 90 capsule 3 7/30/2021 at am     Vitamin D3 (CHOLECALCIFEROL) 125 MCG (5000 UT) tablet Take 125 mcg by mouth daily   Past Week at Unknown time            Medications:        Current Facility-Administered Medications   Medication Last Rate     Current Facility-Administered Medications   Medication Dose Route Frequency     acetaminophen  975 mg Oral Q8H     amLODIPine  10 mg Oral Daily     aspirin  81 mg Oral Daily     atorvastatin  80 mg Oral Daily     enoxaparin ANTICOAGULANT  40 mg Subcutaneous Q24H     insulin aspart  1-7 Units Subcutaneous TID AC     insulin aspart  1-5 Units Subcutaneous At Bedtime     sodium chloride (PF)  3 mL Intracatheter Q8H     tamsulosin  0.4 mg Oral Daily     Current Facility-Administered Medications   Medication Dose Route Frequency     acetaminophen  650 mg Oral Q4H PRN     glucose  15-30 g Oral Q15 Min PRN    Or     dextrose  25-50 mL Intravenous Q15 Min PRN    Or     glucagon  1 mg Subcutaneous Q15 Min PRN     HYDROmorphone  0.2 mg Intravenous Q2H PRN    Or     HYDROmorphone  0.4 mg Intravenous Q2H PRN     lidocaine 4%   Topical Q1H PRN     lidocaine (buffered or not buffered)  0.1-1 mL Other Q1H PRN     naloxone  0.2 mg Intravenous Q2 Min PRN    Or     naloxone  0.4 mg Intravenous Q2 Min PRN    Or     naloxone  0.2 mg Intramuscular Q2 Min PRN    Or     naloxone  0.4 mg Intramuscular Q2 Min PRN     ondansetron  4 mg Oral Q6H PRN    Or     ondansetron  4 mg Intravenous Q6H PRN     sodium chloride (PF)  3 mL Intracatheter q1 min prn     ___________________________________________________________________________  Medications       acetaminophen  975 mg Oral Q8H     amLODIPine  10 mg Oral Daily     aspirin  81 mg Oral Daily     atorvastatin  80 mg Oral Daily     enoxaparin ANTICOAGULANT  40 mg Subcutaneous Q24H     insulin aspart  1-7 Units Subcutaneous TID AC     insulin aspart  1-5 Units Subcutaneous At Bedtime      sodium chloride (PF)  3 mL Intracatheter Q8H     tamsulosin  0.4 mg Oral Daily          Data:      Lab data reviewed.     Data   Recent Labs   Lab 08/02/21  0833 08/02/21  0712 08/01/21  2101 08/01/21  0746 07/31/21  0758 07/30/21  1300 07/29/21  0901   WBC  --   --   --  8.8 12.0*  --  6.5   HGB  --   --   --  9.4* 8.4* 9.1* 7.3*   MCV  --   --   --  74* 73*  --  71*   PLT  --   --   --  395 385  --  427   NA  --  138  --   --  137  --   --    POTASSIUM  --  3.7  --   --  3.7  --   --    CHLORIDE  --  105  --   --  105  --   --    CO2  --  24  --   --  24  --   --    BUN  --  6*  --   --  6*  --   --    CR  --  0.65*  --   --  0.60*  --   --    ANIONGAP  --  9  --   --  8  --   --    EDER  --  8.7  --   --  8.4*  --   --     107 155*  --  132*  --   --            Imaging:      Imaging data reviewed.       Maggie Cotton MD  Hospitalist

## 2021-08-02 NOTE — PROGRESS NOTES
Colon and Rectal [facility]  Daily Progress Note    Subjective  Saw patient earlier this morning. Doing well and feeling ready to discharge. Continues to pass gas and stool. Tolerating LFD without nausea. Ambulating frequently    Objective  Intake/Output last 24 hrs:    Intake/Output Summary (Last 24 hours) at 8/2/2021 1658  Last data filed at 8/2/2021 1200  Gross per 24 hour   Intake 803 ml   Output 725 ml   Net 78 ml     Temp:  [98.1  F (36.7  C)-98.5  F (36.9  C)] 98.2  F (36.8  C)  Pulse:  [81-97] 97  Resp:  [16-18] 18  BP: (123-133)/(74-84) 128/74  SpO2:  [96 %-98 %] 98 %    Physical Exam:  General: awake, alert, sitting in chair, in no acute distress  Head: normocephalic, atraumatic  Respiratory: non-labored breathing  Abdomen: soft, appropriately tender, non-distended              Incisions: clean, dry and intact  Skin: No rashes or lesions  Musculoskeletal: moves all four extremities equally; no calf edema or tenderness  Psychological: alert and oriented, answers questions appropriately    Pertinent Labs  Lab Results: personally reviewed.  Lab Results   Component Value Date     08/02/2021     07/31/2021     07/21/2021    CO2 24 08/02/2021    CO2 24 07/31/2021    CO2 23 07/21/2021    BUN 6 08/02/2021    BUN 6 07/31/2021    BUN 20 07/21/2021     Lab Results   Component Value Date    WBC 8.8 08/01/2021    WBC 12.0 07/31/2021    WBC 6.5 07/29/2021    HGB 9.4 08/01/2021    HGB 8.4 07/31/2021    HGB 9.1 07/30/2021    HCT 32.3 08/01/2021    HCT 28.7 07/31/2021    HCT 25.5 07/29/2021    MCV 74 08/01/2021    MCV 73 07/31/2021    MCV 71 07/29/2021     08/01/2021     07/31/2021     07/29/2021       Assessment/Plan: This is a 71 year old male POD #3 s/p lap->open right hemicolectomy    -Continue LFD  -OOB/ambulate frequently  -Lovenox dvt ppx; will need teaching   -Encourage IS  -Plan for discharge today  -Discharge instructions discussed\    Discussed with Dr. Sunita Encinas,  RAD  [facility]  [phone]..............................main

## 2021-08-02 NOTE — PLAN OF CARE
[id number]    Problem: Pain (Bowel Resection)  Goal: Acceptable Pain Control  Outcome: Improving  Patient reporting pain 3/10 which is controlled with scheduled Tylenol only    Oneil was pulled out on evening shift however patient has yet to void. Has tried but reports not able. Highest bladder scan showed 106 ml. Will continue to monitor.

## 2021-08-02 NOTE — PLAN OF CARE
Problem: Pain (Bowel Resection)  Goal: Acceptable Pain Control  Outcome: Improving     Problem: Postoperative Nausea and Vomiting (Bowel Resection)  Goal: Nausea and Vomiting Relief  Outcome: Improving   Patient denies pain, nausea or emesis after low fiber diet. Tolerated well.

## 2021-08-02 NOTE — PLAN OF CARE
Discharge education done. Patient and spouse demonstrated and verbalized back..  by spouse with all belongings

## 2021-08-02 NOTE — DISCHARGE SUMMARY
Discharge Summary    Patient name: Regis Brooke  YOB: 1950   Age: 71 year old  Medical Record Number: [id number]  Primary Care Physician:  Priti Charles       Admission Date: 7/30/2021.  Discharge Date: 8/2/2021  Condition at Discharge: Stable       Principal Diagnosis:  Ascending Colon Cancer    HISTORY OF PRESENT ILLNESS The patient is a 71 year old male with iron deficiency anemia who underwent a colonoscopy where he was found to have a large mass in the ascending colon which was a moderately differentiated adenocarcinoma. Staging workup was negative for any distant metastatic disease and his CEA was 2.7. He also had a hx of a prior right partial nephrectomy.    PROCEDURES PERFORMED DURING HOSPITALIZATION:   1. Laparoscopic converted to open right colectomy  2. Cystoscopy with ureteral stent placement (Dr. Mcnulty)    FINAL PATHOLOGY: Pending    BRIEF HOSPITAL COURSE: This 71 year old male presented for an elective laparoscopic converted to open right colectomy and was transferred to the surgical jacome following the procedure.  Diet was advanced with return of bowel function.  Pain medication was transitioned from IV to oral uneventfully.  At the time of discharge, the patient was voiding freely, tolerating a regular diet, and pain was controlled with oral pain medications.  He was discharged home on POD #3 in stable condition.     PHYSICAL EXAM ON DATE OF DISCHARGE:     General: awake, alert, sitting in chair, in no acute distress  Head: normocephalic, atraumatic  Respiratory: non-labored breathing  Abdomen: soft, appropriately tender, non-distended              Incisions: clean, dry and intact  Skin: No rashes or lesions  Musculoskeletal: moves all four extremities equally; no calf edema or tenderness  Psychological: alert and oriented, answers questions appropriately    Vital Signs in last 24 hours:   Temp:  [98.1  F (36.7  C)-98.5  F (36.9  C)] 98.2  F (36.8  C)  Pulse:  [81-97] 97  Resp:   [16-18] 18  BP: (123-133)/(74-84) 128/74  SpO2:  [96 %-98 %] 98 %    COMPLICATIONS IN HOSPITAL: None  IMPORTANT PENDING TEST RESULTS: Pathology    Discharge Medications/Orders:  Extended Lovenox    FOLLOW UP: He should see Priti Charles in 1 week.  Follow up with Dr. Dorsey in 3-4 weeks.     Total time spent for discharge on date of discharge: 20 minutes, with over half spent in counseling and coordinating care  I saw the patient on the date of discharge    Gabe Encinas PA-C  [facility]  [phone]    ADDENDUM:  Length of stay: 3 days  Indicate Y or N for the following:  UTI NO   C diff NO  PNA NO  SSI NO  DVT NO  PE NO  CVA NO  MI NO  Enterocutaneous fistula NO  Peripheral nerve injury NO  Abscess (not adjacent to anastomosis) NO  Leak NO            Death within 30 days NO  Reintubation NO  Reoperation NO                FOR CANCER CASES:  T stage (1,2,3,4,5 if unknown):5  N stage (0,1,2,3 if unknown): 3              Total number of nodes: Unknown                Total positive: Unknown   M stage (0,1): Unknown  R (0,1,2,3 if unknown):3  MSI (pos, neg): Unknown

## 2021-08-02 NOTE — PROCEDURES
[facility] Operative Note    Date of Procedure: 7/30/2021     Preoperative diagnosis: ascending colon cancer    Postoperative diagnosis: same     Procedure performed:   1. Laparoscopic converted to open right colectomy  2. Cystoscopy with ureteral stent placement (Dr. Mcnulty)    Surgeon: Ana Dorsey MD    Assistant: Aurelia Jo MD [place] colorectal fellow    Anesthesia: General endotracheal    Estimated blood loss: 30 mL    Specimens: 1: right colectomy    Complications: none    Findings: large tumor in right colon causing cecum and hepatic       Indication for procedure:   The patient is a 71 year old male with iron deficiency anemia who underwent a colonoscopy where he was found to have a large mass in the ascending colon which was a moderately differentiated adenocarcinoma. Staging workup was negative for any distant metastatic disease and his CEA was 2.7. He also had a hx of a prior right partial nephrectomy. We discussed surgical resection and the risks including bleeding, infection, anastomotic leak, potential need for a stoma, damage to neighboring structures, cardiopulmonary complications, blood clots and even death. He verbalized understanding and consented to proceed.    Description of procedure:   On 7/30/2021, Regis Brooke was brought to the operating room.  The patient was placed on the operating table in lithotomy position on the pink Pigazzi pad and general endotracheal anesthesia was achieved. Dr. Mcnulty started with a cysto and right ureteral stent placement tabitha carey placement. Please see his separate dictation for details.  All pressure points were padded with gel and foam as appropriate and the patient was secured to the operating room table with a lap belt and the chest was secured with a foam strap. The left arm was tucked at the patient's side. The patient's abdomen was prepped and draped in usual sterile fashion. Prior to the procedure a timeout was performed  per protocol and preoperative antibiotics were given per SCIP measures.              We began by gaining access to the abdomen via a LUQ 5mm optical trocar. We were able to do so and survey the abdomen for metastatic disease which there was no evidence of. We placed an additional LLQ working port. The patient had a kind of retroperitoneal hernia on the right from his prior surgery and adhesions from his prior right nephrectomy and cholecystectomy that made visualization and exposure of the right colon difficult. We therefore decided to proceed open.     We made an upper midline incision, trying to stay above his mesh from prior hernia but we did go a bit into this at the inferior aspect of the incision. We placed a large David wound protector. We then proceeded to mobilize the colon in a lateral to medial fashion. The White line of toldt was somewhat scarred and fibrotic from his prior surgery. We were able to mobilize the right colon medially. We then started taking down the hepatic flexure, moving medial to lateral by entering the lesser sac and carrying our dissection laterally. There were also some dense adhesions between the hepatic flexure and the liver due to his prior allie that were taken down with cautery. We were eventually able to completely mobilize this up so the colon was up in the incision. We could clearly see duodenum well away posterior in the retroperitoneum from our dissection. He had what appeared to be either scar tissue or a densely fibrotic node at the base of the ileocolic pedicle essentially right on top of the pancreas. Given this location it was not resectable. The mass in his colon was also large and essentially had caused the cecum to be attached up to the hepatic flexure.    We proceeded with our colectomy. We made a mesenteric window on the terminal ileum and transverse colon. The intervening mesentery was taken with the ligasure device. We performed a high ligation of the ileocolic  pedicle which was clamped. Two stick ties and a 0 vicryl tie were used on the staying side for hemostasis. Again there was this firm node that was proximal to this on the pancreas that was unresectable. We then proceeded to make our side to side functional end to end stapled anastomosis in Allen fashion. Two enterotomies were made on the specimen side and the stapler was inserted into each limb, mated and fired. The common channel was closed and specimen was resected with an additional fire of the TA 90 blue stapler. A crotch stitch was placed. The TA staple line was oversewn with a running 3-0 vicryl and the corners dunked with lembert sutures.     The anastomosis was returned to the abdomen which was irrigated and checked for hemostasis which we were satisfied with.   We changed our gowns and gloves and we all moved to the closing tray.     We closed the fascia with 2 running 0 prolene sutures and several interrupted 0 prolene sutures given the mesh. The subcutaneous tissue was irrigated. The skin was closed with 4-0 monocryl and dermabond was applied. I removed the right ureteral stent at the end of the case which was intact.    All sponge, needle and instrument counts were correct x 2 at the conclusion of the case.     The patient was awakened from anesthesia and extubated in the operating room. The patient tolerated the procedure well and was transferred to the PACU.     Ana Dorsey  [facility]  Pager: [phone]  Office: [phone]  8/2/2021 7:50 AM

## 2021-08-10 NOTE — OP NOTE
DOS: 7/30/21  Preoperative diagnosis: ascending colon cancer  Postoperative diagnosis:same  Surgery: cystoscopy, right ureteral stent placement  Surgeon: Wyatt Mcnulty MD  Assistant: none  Specimen: none  Ebl: none  Drains: carey  Anesthesia: general  Complications: none    Surgery:   Patient was brought into the operating room and identified as Regis Brooke  After induction of anesthesia a time out was carried out, everyone was in agreement  I began by inserting a rigid cystoscope into the bladder.   The prostate was enlarged with an obstructing median lobe.   I was able to identify the right ureteral orrifice.   A brush wire was advanced into the orrifice without any difficulty  Over the wire I advanced a 5fr open ended catheter to the 20cm pema.   This went without difficulty.   I then removed the cystoscope.   A carey catheter was placed  The stent was internalized and secured with plastic ties.     This completed my procedure     Wyatt Mcnulty MD

## 2021-08-11 ENCOUNTER — OFFICE VISIT (OUTPATIENT)
Dept: FAMILY MEDICINE | Facility: CLINIC | Age: 71
End: 2021-08-11
Payer: COMMERCIAL

## 2021-08-11 VITALS
HEART RATE: 88 BPM | RESPIRATION RATE: 16 BRPM | SYSTOLIC BLOOD PRESSURE: 112 MMHG | DIASTOLIC BLOOD PRESSURE: 70 MMHG | WEIGHT: 196 LBS | BODY MASS INDEX: 28.94 KG/M2

## 2021-08-11 DIAGNOSIS — H61.23 BILATERAL IMPACTED CERUMEN: ICD-10-CM

## 2021-08-11 DIAGNOSIS — C18.2 MALIGNANT NEOPLASM OF ASCENDING COLON (H): Primary | ICD-10-CM

## 2021-08-11 DIAGNOSIS — D50.9 MICROCYTIC ANEMIA: ICD-10-CM

## 2021-08-11 DIAGNOSIS — C18.9 MALIGNANT NEOPLASM OF COLON, UNSPECIFIED PART OF COLON (H): ICD-10-CM

## 2021-08-11 PROBLEM — K57.30 DIVERTICULAR DISEASE OF LARGE INTESTINE: Status: ACTIVE | Noted: 2021-07-01

## 2021-08-11 LAB
BASOPHILS # BLD AUTO: 0.1 10E3/UL (ref 0–0.2)
BASOPHILS NFR BLD AUTO: 1 %
EOSINOPHIL # BLD AUTO: 0.4 10E3/UL (ref 0–0.7)
EOSINOPHIL NFR BLD AUTO: 4 %
ERYTHROCYTE [DISTWIDTH] IN BLOOD BY AUTOMATED COUNT: 19.9 % (ref 10–15)
HCT VFR BLD AUTO: 31.2 % (ref 40–53)
HGB BLD-MCNC: 9.5 G/DL (ref 13.3–17.7)
IMM GRANULOCYTES # BLD: 0 10E3/UL
IMM GRANULOCYTES NFR BLD: 0 %
LYMPHOCYTES # BLD AUTO: 2 10E3/UL (ref 0.8–5.3)
LYMPHOCYTES NFR BLD AUTO: 22 %
MCH RBC QN AUTO: 22.2 PG (ref 26.5–33)
MCHC RBC AUTO-ENTMCNC: 30.4 G/DL (ref 31.5–36.5)
MCV RBC AUTO: 73 FL (ref 78–100)
MONOCYTES # BLD AUTO: 0.8 10E3/UL (ref 0–1.3)
MONOCYTES NFR BLD AUTO: 9 %
NEUTROPHILS # BLD AUTO: 6 10E3/UL (ref 1.6–8.3)
NEUTROPHILS NFR BLD AUTO: 65 %
PLATELET # BLD AUTO: 367 10E3/UL (ref 150–450)
RBC # BLD AUTO: 4.27 10E6/UL (ref 4.4–5.9)
WBC # BLD AUTO: 9.3 10E3/UL (ref 4–11)

## 2021-08-11 PROCEDURE — 36415 COLL VENOUS BLD VENIPUNCTURE: CPT | Performed by: FAMILY MEDICINE

## 2021-08-11 PROCEDURE — 88342 IMHCHEM/IMCYTCHM 1ST ANTB: CPT | Mod: 26 | Performed by: PATHOLOGY

## 2021-08-11 PROCEDURE — 88364 INSITU HYBRIDIZATION (FISH): CPT | Mod: 26 | Performed by: PATHOLOGY

## 2021-08-11 PROCEDURE — 88341 IMHCHEM/IMCYTCHM EA ADD ANTB: CPT | Mod: 26 | Performed by: PATHOLOGY

## 2021-08-11 PROCEDURE — 85025 COMPLETE CBC W/AUTO DIFF WBC: CPT | Performed by: FAMILY MEDICINE

## 2021-08-11 PROCEDURE — 69209 REMOVE IMPACTED EAR WAX UNI: CPT | Mod: 50 | Performed by: FAMILY MEDICINE

## 2021-08-11 PROCEDURE — 88365 INSITU HYBRIDIZATION (FISH): CPT | Mod: 26 | Performed by: PATHOLOGY

## 2021-08-11 PROCEDURE — 88309 TISSUE EXAM BY PATHOLOGIST: CPT | Mod: 26 | Performed by: PATHOLOGY

## 2021-08-11 PROCEDURE — 99214 OFFICE O/P EST MOD 30 MIN: CPT | Mod: 25 | Performed by: FAMILY MEDICINE

## 2021-08-11 NOTE — ASSESSMENT & PLAN NOTE
Chet is feeling good today.  He is accompanied by his wife Leyda.  They say that he is tolerating all his medications and food and water well.  He is having normal bowel movements and urination.  His main complaint is that he has some hearing loss in the left ear which occurred suddenly 4 days after surgery.  See impacted cerumen in the problem list for more details.    They have an appointment with Dr. Dorsey next week to follow-up on pathology.

## 2021-08-11 NOTE — PROGRESS NOTES
Chief Complaint   Patient presents with     Hospital F/U        ASSESSMENT AND PLAN:     Problem List Items Addressed This Visit        Nervous and Auditory    Bilateral impacted cerumen     Bilateral ceruminosis with impacted cerumen obstructed both ear canals noted after the patient complains of hearing loss 4 days after surgery in the left ear.    Bilateral otoscopic exam reveals impacted cerumen obstructing both ear canals     Ear lavage performed bilaterally today.         Relevant Orders    REMOVE IMPACTED CERUMEN (Completed)       Digestive    Malignant neoplasm of ascending colon (H) - Primary    Relevant Orders    CBC with platelets and differential (Completed)    Colon cancer (H)     Chet is feeling good today.  He is accompanied by his wife Leyda.  They say that he is tolerating all his medications and food and water well.  He is having normal bowel movements and urination.  His main complaint is that he has some hearing loss in the left ear which occurred suddenly 4 days after surgery.  See impacted cerumen in the problem list for more details.    They have an appointment with Dr. Dorsey next week to follow-up on pathology.         Relevant Orders    CBC with platelets and differential (Completed)       Hematologic    Microcytic anemia    Relevant Orders    CBC with platelets and differential (Completed)             SUBJECTIVE: Regis Brooke is a 71 year old male comes in for hospital follow up.  He says he is feeling really well.  He has little to no pain in his abdomen after his bowel resection.  His main complaint today is that he is having a loss of hearing in the left ear which occurred suddenly 4 days after surgery.  He wonders what might of caused this.  He is planning to go get his hearing checked and look into getting hearing aids soon.    DATE OF ADMISSION: 7/30/21  DATE OF DISCHARGE: 8/2/2021  DISCHARGE DIAGNOSIS:  Ascending Colon Cancer  BRIEF HOSPITAL COURSE:    HISTORY OF PRESENT  ILLNESS The patient is a 71 year old male with iron deficiency anemia who underwent a colonoscopy where he was found to have a large mass in the ascending colon which was a moderately differentiated adenocarcinoma. Staging workup was negative for any distant metastatic disease and his CEA was 2.7. He also had a hx of a prior right partial nephrectomy.     PROCEDURES PERFORMED DURING HOSPITALIZATION:   1. Laparoscopic converted to open right colectomy  2. Cystoscopy with ureteral stent placement (Dr. Mcnulty)     FINAL PATHOLOGY: Pending     BRIEF HOSPITAL COURSE: This 71 year old male presented for an elective laparoscopic converted to open right colectomy and was transferred to the surgical jacome following the procedure.  Diet was advanced with return of bowel function.  Pain medication was transitioned from IV to oral uneventfully.  At the time of discharge, the patient was voiding freely, tolerating a regular diet, and pain was controlled with oral pain medications.  He was discharged home on POD #3 in stable condition.     IMPORTANT PENDING RESULTS:   Hemoglobin   Date Value Ref Range Status   08/11/2021 9.5 (L) 13.3 - 17.7 g/dL Final   ]     Patient Active Problem List   Diagnosis     Hyperlipidemia     Hypertension     Nephrolithiasis Of The Left Kidney     Type 2 diabetes mellitus without complication (H)     Hx of renal cell cancer     Vitamin D Deficiency     Preventative health care     Wears hearing aid - bilateral     Pain in both hands     Cognitive deficits     Microcytic anemia     Malignant neoplasm of ascending colon (H)     Colon cancer (H)     Diverticular disease of large intestine     Iron deficiency anemia     Bilateral impacted cerumen     Past Medical History:   Diagnosis Date     Calculus of kidney     Created by Conversion      Cognitive deficits     wife states not prominent     Essential hypertension     Lisinopril and norvasc.  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 As directed 90 tablet 1     aspirin (ASA) 325 MG EC tablet Take 325 mg by mouth daily       atorvastatin (LIPITOR) 80 MG tablet [ATORVASTATIN (LIPITOR) 80 MG TABLET] TAKE 1 TABLET(80 MG) BY MOUTH DAILY 90 tablet 3     blood glucose test strips [BLOOD GLUCOSE TEST STRIPS] Use 1 each As Directed 3 (three) times a day. 300 strip 3     calcium carbonate (OS-EDER) 500 MG tablet Take 1 tablet by mouth daily       CRANBERRY EXTRACT (CRANBERRY ORAL) [CRANBERRY EXTRACT (CRANBERRY ORAL)] Take by mouth.       cyanocobalamin (VITAMIN B-12) 100 MCG tablet Take 100 mcg by mouth daily       dulaglutide (TRULICITY) 1.5 mg/0.5 mL PnIj [DULAGLUTIDE (TRULICITY) 1.5 MG/0.5 ML PNIJ] Inject 1.5 mg under the skin every 7 days. 30 mL 0     enoxaparin ANTICOAGULANT (LOVENOX) 40 MG/0.4ML syringe Inject 0.4 mLs (40 mg) Subcutaneous every 24 hours for 25 days 10 mL 0     ferrous sulfate 325 (65 FE) MG tablet [FERROUS SULFATE 325 (65 FE) MG TABLET] Take 1 tablet (325 mg total) by mouth daily. 30 tablet 0     glipiZIDE (GLUCOTROL XL) 10 MG 24 hr tablet [GLIPIZIDE (GLUCOTROL XL) 10 MG 24 HR TABLET] Take 1 tablet (10 mg total) by mouth daily. 90 tablet 1     lisinopriL (PRINIVIL,ZESTRIL) 20 MG tablet [LISINOPRIL (PRINIVIL,ZESTRIL) 20 MG TABLET] Take 1 tablet (20 mg  total) by mouth daily. 90 tablet 1     metFORMIN (GLUCOPHAGE) 500 MG tablet [METFORMIN (GLUCOPHAGE) 500 MG TABLET] TAKE 2 TABLETS(1000 MG) BY MOUTH TWICE DAILY WITH MEALS 360 tablet 0     MULTIVITAMIN ORAL Take 1 tablet by mouth daily        tamsulosin (FLOMAX) 0.4 mg cap [TAMSULOSIN (FLOMAX) 0.4 MG CAP] TAKE 1 CAPSULE(0.4 MG) BY MOUTH DAILY AFTER BREAKFAST 90 capsule 3     Vitamin D3 (CHOLECALCIFEROL) 125 MCG (5000 UT) tablet Take 125 mcg by mouth daily       Allergies   Allergen Reactions     Morphine Nausea and Vomiting         OBJECTIVE: /70   Pulse 88   Resp 16   Wt 88.9 kg (196 lb)   BMI 28.94 kg/m    EXAM:  Physical Exam  Constitutional:       Appearance: Normal appearance.   HENT:      Head: Normocephalic and atraumatic.      Comments: After the ears were lavaged bilaterally the tympanic membranes were visualized on both sides and appeared normal.     Right Ear: There is impacted cerumen.      Left Ear: There is impacted cerumen.   Cardiovascular:      Rate and Rhythm: Normal rate and regular rhythm.      Heart sounds: Normal heart sounds.   Pulmonary:      Effort: Pulmonary effort is normal.      Breath sounds: Normal breath sounds.   Abdominal:      General: Bowel sounds are normal.      Palpations: Abdomen is soft.      Comments: Midline vertical scar from open colon resection noted as well as laparoscopic incisions.  All are well-healed.  Abdominal exam reveals normal bowel sounds with soft belly and minimal discomfort with palpation.   Musculoskeletal:         General: Normal range of motion.      Cervical back: Normal range of motion and neck supple.   Neurological:      General: No focal deficit present.      Mental Status: He is alert.   Psychiatric:         Mood and Affect: Mood normal.         Behavior: Behavior normal.         Thought Content: Thought content normal.         Judgment: Judgment normal.          Lab Results   Component Value Date    WBC 9.3 08/11/2021     Lab Results    Component Value Date    RBC 4.27 08/11/2021     Lab Results   Component Value Date    HGB 9.5 08/11/2021     Lab Results   Component Value Date    HCT 31.2 08/11/2021     No components found for: MCT  Lab Results   Component Value Date    MCV 73 08/11/2021     Lab Results   Component Value Date    MCH 22.2 08/11/2021     Lab Results   Component Value Date    MCHC 30.4 08/11/2021     Lab Results   Component Value Date    RDW 19.9 08/11/2021     Lab Results   Component Value Date     08/11/2021          Post Discharge Medication Reconciliation Status: discharge medications reconciled, continue medications without change     Additional History from Old Records Summarized (2): YES  Decision to Obtain Records (1): no   Radiology Tests Summarized or Ordered (1): no   Labs Reviewed or Ordered (1): YES  Medicine Test Summarized or Ordered (1): no   Independent Review of EKG or X-RAY(2 each): no

## 2021-08-11 NOTE — ASSESSMENT & PLAN NOTE
Bilateral ceruminosis with impacted cerumen obstructed both ear canals noted after the patient complains of hearing loss 4 days after surgery in the left ear.    Bilateral otoscopic exam reveals impacted cerumen obstructing both ear canals     Ear lavage performed bilaterally today.

## 2021-08-15 LAB
PATH REPORT.ADDENDUM SPEC: ABNORMAL
PATH REPORT.COMMENTS IMP SPEC: ABNORMAL
PATH REPORT.COMMENTS IMP SPEC: YES
PATH REPORT.FINAL DX SPEC: ABNORMAL
PATH REPORT.GROSS SPEC: ABNORMAL
PATH REPORT.MICROSCOPIC SPEC OTHER STN: ABNORMAL
PATH REPORT.RELEVANT HX SPEC: ABNORMAL
PATHOLOGY SYNOPTIC REPORT: ABNORMAL
PHOTO IMAGE: ABNORMAL

## 2021-08-18 ENCOUNTER — TRANSFERRED RECORDS (OUTPATIENT)
Dept: HEALTH INFORMATION MANAGEMENT | Facility: CLINIC | Age: 71
End: 2021-08-18

## 2021-09-20 ENCOUNTER — TRANSFERRED RECORDS (OUTPATIENT)
Dept: HEALTH INFORMATION MANAGEMENT | Facility: CLINIC | Age: 71
End: 2021-09-20

## 2021-10-02 DIAGNOSIS — E78.5 HYPERLIPIDEMIA: ICD-10-CM

## 2021-10-03 RX ORDER — ATORVASTATIN CALCIUM 80 MG/1
TABLET, FILM COATED ORAL
Qty: 90 TABLET | Refills: 2 | Status: SHIPPED | OUTPATIENT
Start: 2021-10-03 | End: 2022-01-03

## 2021-10-03 NOTE — TELEPHONE ENCOUNTER
"Last Written Prescription Date:  12/21/2020  Last Fill Quantity: 90,  # refills: 3   Last office visit provider:  8/11/21     Requested Prescriptions   Pending Prescriptions Disp Refills     atorvastatin (LIPITOR) 80 MG tablet [Pharmacy Med Name: ATORVASTATIN 80MG TABLETS] 90 tablet 3     Sig: TAKE 1 TABLET(80 MG) BY MOUTH DAILY       Statins Protocol Passed - 10/2/2021 10:08 AM        Passed - LDL on file in past 12 months     Recent Labs   Lab Test 06/11/21  0659   LDL 49             Passed - No abnormal creatine kinase in past 12 months     No lab results found.             Passed - Recent (12 mo) or future (30 days) visit within the authorizing provider's specialty     Patient has had an office visit with the authorizing provider or a provider within the authorizing providers department within the previous 12 mos or has a future within next 30 days. See \"Patient Info\" tab in inbasket, or \"Choose Columns\" in Meds & Orders section of the refill encounter.              Passed - Medication is active on med list        Passed - Patient is age 18 or older             Rowdy Correia RN 10/03/21 10:40 AM  "

## 2021-10-05 ENCOUNTER — TRANSFERRED RECORDS (OUTPATIENT)
Dept: HEALTH INFORMATION MANAGEMENT | Facility: CLINIC | Age: 71
End: 2021-10-05

## 2021-10-11 ENCOUNTER — HEALTH MAINTENANCE LETTER (OUTPATIENT)
Age: 71
End: 2021-10-11

## 2021-11-10 DIAGNOSIS — N40.0 BPH (BENIGN PROSTATIC HYPERPLASIA): ICD-10-CM

## 2021-11-11 RX ORDER — TAMSULOSIN HYDROCHLORIDE 0.4 MG/1
CAPSULE ORAL
Qty: 90 CAPSULE | Refills: 1 | Status: SHIPPED | OUTPATIENT
Start: 2021-11-11 | End: 2022-02-09

## 2021-11-11 NOTE — TELEPHONE ENCOUNTER
"Routing refill request to provider for review/approval because:  Early refill request.    Last Written Prescription Date:  2/2/21  Last Fill Quantity: 90,  # refills: 3   Last office visit provider:  8/11/21     Requested Prescriptions   Pending Prescriptions Disp Refills     tamsulosin (FLOMAX) 0.4 MG capsule [Pharmacy Med Name: TAMSULOSIN 0.4MG CAPSULES] 90 capsule 3     Sig: TAKE 1 CAPSULE(0.4 MG) BY MOUTH DAILY AFTER BREAKFAST       Alpha Blockers Passed - 11/10/2021  8:08 AM        Passed - Blood pressure under 140/90 in past 12 months     BP Readings from Last 3 Encounters:   08/11/21 112/70   08/02/21 128/74   07/29/21 128/82                 Passed - Recent (12 mo) or future (30 days) visit within the authorizing provider's specialty     Patient has had an office visit with the authorizing provider or a provider within the authorizing providers department within the previous 12 mos or has a future within next 30 days. See \"Patient Info\" tab in inbasket, or \"Choose Columns\" in Meds & Orders section of the refill encounter.              Passed - Patient does not have Tadalafil, Vardenafil, or Sildenafil on their medication list        Passed - Medication is active on med list        Passed - Patient is 18 years of age or older             Carla Pryor RN 11/11/21 8:52 AM  "

## 2021-12-05 ENCOUNTER — HEALTH MAINTENANCE LETTER (OUTPATIENT)
Age: 71
End: 2021-12-05

## 2021-12-17 DIAGNOSIS — E11.9 TYPE 2 DIABETES MELLITUS WITHOUT COMPLICATION, WITHOUT LONG-TERM CURRENT USE OF INSULIN (H): ICD-10-CM

## 2021-12-17 DIAGNOSIS — I10 ESSENTIAL HYPERTENSION: ICD-10-CM

## 2021-12-20 RX ORDER — LISINOPRIL 20 MG/1
TABLET ORAL
Qty: 90 TABLET | Refills: 1 | OUTPATIENT
Start: 2021-12-20

## 2021-12-20 RX ORDER — GLIPIZIDE 10 MG/1
TABLET, FILM COATED, EXTENDED RELEASE ORAL
Qty: 90 TABLET | Refills: 1 | OUTPATIENT
Start: 2021-12-20

## 2021-12-20 NOTE — TELEPHONE ENCOUNTER
Refilled 6/17/21 with 2 refills.    Outpatient Medication Detail     Disp Refills Start End KENDRICK   lisinopriL (PRINIVIL,ZESTRIL) 20 MG tablet 90 tablet 2 6/17/2021  No   Sig: TAKE 1 TABLET(20 MG) BY MOUTH DAILY   Sent to pharmacy as: lisinopriL 20 mg tablet (PRINIVIL,ZESTRIL)   E-Prescribing Status: Receipt confirmed by pharmacy (6/17/2021  8:33 AM CDT)     Outpatient Medication Detail     Disp Refills Start End KENDRICK   glipiZIDE (GLUCOTROL XL) 10 MG 24 hr tablet 90 tablet 2 6/17/2021  No   Sig: TAKE 1 TABLET(10 MG) BY MOUTH DAILY   Sent to pharmacy as: glipiZIDE ER 10 mg tablet, extended release 24 hr (GLUCOTROL XL)   E-Prescribing Status: Receipt confirmed by pharmacy (6/17/2021  8:33 AM CDT)

## 2022-01-02 ENCOUNTER — MYC MEDICAL ADVICE (OUTPATIENT)
Dept: FAMILY MEDICINE | Facility: CLINIC | Age: 72
End: 2022-01-02
Payer: COMMERCIAL

## 2022-01-02 DIAGNOSIS — E78.2 MIXED HYPERLIPIDEMIA: ICD-10-CM

## 2022-01-03 RX ORDER — ATORVASTATIN CALCIUM 80 MG/1
TABLET, FILM COATED ORAL
Qty: 90 TABLET | Refills: 1 | Status: SHIPPED | OUTPATIENT
Start: 2022-01-03 | End: 2022-07-01

## 2022-02-09 ENCOUNTER — OFFICE VISIT (OUTPATIENT)
Dept: FAMILY MEDICINE | Facility: CLINIC | Age: 72
End: 2022-02-09
Payer: COMMERCIAL

## 2022-02-09 VITALS
DIASTOLIC BLOOD PRESSURE: 74 MMHG | HEART RATE: 88 BPM | WEIGHT: 228 LBS | OXYGEN SATURATION: 99 % | BODY MASS INDEX: 33.67 KG/M2 | SYSTOLIC BLOOD PRESSURE: 138 MMHG

## 2022-02-09 DIAGNOSIS — Z00.00 PREVENTATIVE HEALTH CARE: ICD-10-CM

## 2022-02-09 DIAGNOSIS — E66.811 CLASS 1 OBESITY DUE TO EXCESS CALORIES WITH SERIOUS COMORBIDITY AND BODY MASS INDEX (BMI) OF 33.0 TO 33.9 IN ADULT: ICD-10-CM

## 2022-02-09 DIAGNOSIS — E66.09 CLASS 1 OBESITY DUE TO EXCESS CALORIES WITH SERIOUS COMORBIDITY AND BODY MASS INDEX (BMI) OF 33.0 TO 33.9 IN ADULT: ICD-10-CM

## 2022-02-09 DIAGNOSIS — N40.1 BENIGN PROSTATIC HYPERPLASIA WITH LOWER URINARY TRACT SYMPTOMS, SYMPTOM DETAILS UNSPECIFIED: ICD-10-CM

## 2022-02-09 DIAGNOSIS — I10 ESSENTIAL HYPERTENSION: ICD-10-CM

## 2022-02-09 DIAGNOSIS — E78.5 HYPERLIPIDEMIA, UNSPECIFIED HYPERLIPIDEMIA TYPE: ICD-10-CM

## 2022-02-09 DIAGNOSIS — C18.9 MALIGNANT NEOPLASM OF COLON, UNSPECIFIED PART OF COLON (H): ICD-10-CM

## 2022-02-09 DIAGNOSIS — E11.9 TYPE 2 DIABETES MELLITUS WITHOUT COMPLICATION, WITHOUT LONG-TERM CURRENT USE OF INSULIN (H): Primary | ICD-10-CM

## 2022-02-09 LAB
CREAT UR-MCNC: 109 MG/DL
HBA1C MFR BLD: 7.1 % (ref 0–5.6)
HOLD SPECIMEN: NORMAL
MICROALBUMIN UR-MCNC: 2.85 MG/DL (ref 0–1.99)
MICROALBUMIN/CREAT UR: 26.1 MG/G CR

## 2022-02-09 PROCEDURE — 91305 COVID-19,PF,PFIZER (12+ YRS): CPT | Performed by: FAMILY MEDICINE

## 2022-02-09 PROCEDURE — 0054A COVID-19,PF,PFIZER (12+ YRS): CPT | Performed by: FAMILY MEDICINE

## 2022-02-09 PROCEDURE — 83036 HEMOGLOBIN GLYCOSYLATED A1C: CPT | Performed by: FAMILY MEDICINE

## 2022-02-09 PROCEDURE — 90471 IMMUNIZATION ADMIN: CPT | Performed by: FAMILY MEDICINE

## 2022-02-09 PROCEDURE — 82043 UR ALBUMIN QUANTITATIVE: CPT | Performed by: FAMILY MEDICINE

## 2022-02-09 PROCEDURE — 99215 OFFICE O/P EST HI 40 MIN: CPT | Mod: 25 | Performed by: FAMILY MEDICINE

## 2022-02-09 PROCEDURE — 90662 IIV NO PRSV INCREASED AG IM: CPT | Performed by: FAMILY MEDICINE

## 2022-02-09 PROCEDURE — 36415 COLL VENOUS BLD VENIPUNCTURE: CPT | Performed by: FAMILY MEDICINE

## 2022-02-09 RX ORDER — TAMSULOSIN HYDROCHLORIDE 0.4 MG/1
0.4 CAPSULE ORAL DAILY
Qty: 90 CAPSULE | Refills: 0 | Status: SHIPPED | OUTPATIENT
Start: 2022-02-09 | End: 2022-05-11

## 2022-02-09 NOTE — ASSESSMENT & PLAN NOTE
Patient has class I obesity, BMI 33.67 in the setting of diabetes, hyperlipidemia, hypertension, and colon cancer.  Weight reduction is strongly recommended.  Regular exercise in the form of walking 30 minutes a day discussed as well as increasing vegetable intake.

## 2022-02-09 NOTE — ASSESSMENT & PLAN NOTE
10/5/2021 ONCOLOGY NOTE REVIEWED AND FOLLOW UP WAS REQUESTED IN 2 WEEKS BUT I CANNOT SEE THAT EVER HAPPENED. I have recommended to follow up with oncology.

## 2022-02-09 NOTE — ASSESSMENT & PLAN NOTE
Covid vaccine done and boosted today.   Flu shot - done.   Colonoscopy: see colon cancer in problem list.    Std testing desired:  offered  Offered cbc, cmp, lipids and asked what other testing he  desires today  Body mass index is 33.67 kg/m .   mychart active.

## 2022-02-09 NOTE — ASSESSMENT & PLAN NOTE
He reports good urine stream with Flomax 0.4 mg capsule daily.  This is refilled for 3 months.  He has been lax on coming in for his diabetes follow-up so I did not give him a longer refill to encourage him to come in for his diabetes follow-up.  He is due for his diabetes follow-up in 3 months.

## 2022-02-09 NOTE — PATIENT INSTRUCTIONS
Return in about 4 weeks (around 3/9/2022) for awv & lung cancer screen discussion. and 3 mo fu for diabetes.

## 2022-02-09 NOTE — ASSESSMENT & PLAN NOTE
Worsening - 7.1 need to get under 7.0  Needs closer follow up - discussed the importance of this today. I recommend at least  4 visits per year just focus on diabetes - prevent of Retinopathy, nephropathy, neuropathy and cad.     Continue metformin 2000 mg po daily  Continue glizipide 10 mg po q day.   Continue trulicity 1.5 mg sq weekly    Continue asa,  lisinopril and lipitor (see htn and hyperlipidemia in problem list for more detail)    ldl noted to be 49 6/2021, repeat now 2/9/2022   Former smoker, not smoking now.   a1c near goal 7.1 but want less than 7.0, he feels diet can be improved and he wants to try that.   Excess weight - see bmi in problem list. (diet/ exercise)  Former smoker, not smoking now.   Recommend limit alcohol   dm ed yearly, dietician yearly - ordered today.   foot exam due 2/9/2023   eye exam - due now, importance discussed.   microalbumin done 2/9/2022

## 2022-02-09 NOTE — PROGRESS NOTES
43 minutes of total clinic time was spent with this patient, and review of the medical record and with documentation.  This time was spent on the day of service.        Assessment & Plan   Problem List Items Addressed This Visit        Digestive    Colon cancer (H)     10/5/2021 ONCOLOGY NOTE REVIEWED AND FOLLOW UP WAS REQUESTED IN 2 WEEKS BUT I CANNOT SEE THAT EVER HAPPENED. I have recommended to follow up with oncology.         Class 1 obesity due to excess calories with body mass index (BMI) of 33.0 to 33.9 in adult     Patient has class I obesity, BMI 33.67 in the setting of diabetes, hyperlipidemia, hypertension, and colon cancer.  Weight reduction is strongly recommended.  Regular exercise in the form of walking 30 minutes a day discussed as well as increasing vegetable intake.            Endocrine    Hyperlipidemia    Relevant Orders    Lipid Profile    Type 2 diabetes mellitus without complication (H) - Primary     Worsening - 7.1 need to get under 7.0  Needs closer follow up - discussed the importance of this today. I recommend at least  4 visits per year just focus on diabetes - prevent of Retinopathy, nephropathy, neuropathy and cad.     Continue metformin 2000 mg po daily  Continue glizipide 10 mg po q day.   Continue trulicity 1.5 mg sq weekly    Continue asa,  lisinopril and lipitor (see htn and hyperlipidemia in problem list for more detail)    ldl noted to be 49 6/2021, repeat now 2/9/2022   Former smoker, not smoking now.   a1c near goal 7.1 but want less than 7.0, he feels diet can be improved and he wants to try that.   Excess weight - see bmi in problem list. (diet/ exercise)  Former smoker, not smoking now.   Recommend limit alcohol   dm ed yearly, dietician yearly - ordered today.   foot exam due 2/9/2023   eye exam - due now, importance discussed.   microalbumin done 2/9/2022         Relevant Orders    Hemoglobin A1c (Completed)    AMB Adult Diabetes Educator Referral    Albumin Random Urine  "Quantitative with Creat Ratio    Adult Eye Referral       Circulatory    Hypertension     Controlled   Continue norvasc 10 mg po q day  Continue lisinopril 20 mg po daily            Urinary    Benign prostatic hyperplasia with lower urinary tract symptoms, symptom details unspecified     He reports good urine stream with Flomax 0.4 mg capsule daily.  This is refilled for 3 months.  He has been lax on coming in for his diabetes follow-up so I did not give him a longer refill to encourage him to come in for his diabetes follow-up.  He is due for his diabetes follow-up in 3 months.         Relevant Medications    tamsulosin (FLOMAX) 0.4 MG capsule       Other    Preventative health care     Covid vaccine done and boosted today.   Flu shot - done.   Colonoscopy: see colon cancer in problem list.    Std testing desired:  offered  Offered cbc, cmp, lipids and asked what other testing he  desires today  Body mass index is 33.67 kg/m .   mychart active.                   BMI:   Estimated body mass index is 33.67 kg/m  as calculated from the following:    Height as of 7/30/21: 1.753 m (5' 9\").    Weight as of this encounter: 103.4 kg (228 lb).   Weight management plan: Discussed healthy diet and exercise guidelines    Estimated body mass index is 33.67 kg/m  as calculated from the following:    Height as of 7/30/21: 1.753 m (5' 9\").    Weight as of this encounter: 103.4 kg (228 lb).  GFR Estimate   Date Value Ref Range Status   08/02/2021 >90 >60 mL/min/1.73m2 Final     Comment:     As of July 11, 2021, eGFR is calculated by the CKD-EPI creatinine equation, without race adjustment. eGFR can be influenced by muscle mass, exercise, and diet. The reported eGFR is an estimation only and is only applicable if the renal function is stable.   12/18/2020 >60 >60 mL/min/1.73m2 Final     Lab Results   Component Value Date    [id number] Negative 07/31/2021       Return in about 4 weeks (around 3/9/2022) for awv & lung cancer screen " discussion. and 3 mo fu for diabetes.    Priti Charles MD  [facility] RICHARD Rosenberg is a 71 year old who presents for the following health issues is accompanied by wife Laisha. Here for diabetes follow up and tamulosin renewal.          Objective    /74 (BP Location: Left arm, Patient Position: Left side, Cuff Size: Adult Large)   Pulse 88   Wt 103.4 kg (228 lb)   SpO2 99%   BMI 33.67 kg/m    Body mass index is 33.67 kg/m .  Physical Exam  Constitutional:       Appearance: Normal appearance.   HENT:      Head: Normocephalic and atraumatic.   Cardiovascular:      Rate and Rhythm: Normal rate and regular rhythm.   Pulmonary:      Effort: Pulmonary effort is normal.   Musculoskeletal:         General: Normal range of motion.      Cervical back: Normal range of motion and neck supple.   Neurological:      General: No focal deficit present.      Mental Status: He is alert.          Diabetic Foot Screen:  Any complaints of increased pain or numbness ? No  Is there a foot ulcer now or a history of foot ulcer? No  Does the foot have an abnormal shape? No  Are the nails thick, too long or ingrown? No  Are there any redness or open areas? No         Sensation Testing done at all points on the diagram with monofilament     Right Foot: Sensation Normal at all points  Left Foot: Sensation Normal at all points     Risk Category: 0- No loss of protective sensation  Performed by Priti Charles MD

## 2022-02-16 NOTE — ADDENDUM NOTE
Addended by: ALEJANDRA JACOBO on: 2/16/2022 11:41 AM     Modules accepted: Orders    
Patient/Caregiver provided printed discharge information.

## 2022-03-12 DIAGNOSIS — E11.9 TYPE 2 DIABETES MELLITUS WITHOUT COMPLICATION, WITHOUT LONG-TERM CURRENT USE OF INSULIN (H): ICD-10-CM

## 2022-03-12 NOTE — TELEPHONE ENCOUNTER
"Routing refill request to provider for review/approval because:  Early refill request    Last Written Prescription Date:  4/28/2021  Last Fill Quantity: 300,  # refills: 3   Last office visit provider:  2/9/2022 Dr. Corley     blood glucose test strips 300 strip 3 4/28/2021  No   Sig - Route: Use 1 each As Directed 3 (three) times a day. - Miscellaneous   Sent to pharmacy as: blood sugar diagnostic strips   E-Prescribing Status: Receipt confirmed by pharmacy (4/28/2021  6:23 AM CDT         Requested Prescriptions   Pending Prescriptions Disp Refills     ONETOUCH VERIO IQ test strip [Pharmacy Med Name: ONE TOUCH RODNEY VERIO] 300 strip 3     Sig: USE AND DISCARD 1 TEST     STRIP 3 TIMES DAILY AS     DIRECTED.       Diabetic Supplies Protocol Passed - 3/12/2022  8:15 AM        Passed - Medication is active on med list        Passed - Patient is 18 years of age or older        Passed - Recent (6 mo) or future (30 days) visit within the authorizing provider's specialty     Patient had office visit in the last 6 months or has a visit in the next 30 days with authorizing provider.  See \"Patient Info\" tab in inbasket, or \"Choose Columns\" in Meds & Orders section of the refill encounter.                 Lennie Montoya RN 03/12/22 4:08 PM  "

## 2022-03-14 RX ORDER — BLOOD SUGAR DIAGNOSTIC
STRIP MISCELLANEOUS
Qty: 300 STRIP | Refills: 3 | Status: SHIPPED | OUTPATIENT
Start: 2022-03-14 | End: 2023-12-13

## 2022-03-18 DIAGNOSIS — I10 ESSENTIAL HYPERTENSION: ICD-10-CM

## 2022-03-18 RX ORDER — AMLODIPINE BESYLATE 10 MG/1
TABLET ORAL
Qty: 90 TABLET | Refills: 3 | Status: SHIPPED | OUTPATIENT
Start: 2022-03-18 | End: 2023-03-27

## 2022-03-18 NOTE — TELEPHONE ENCOUNTER
"Routing refill request to provider for review/approval because:  Labs out of range:  Creatinine    Last Written Prescription Date:  12/18/2020  Last Fill Quantity: 90,  # refills: 1   Last office visit provider:  2/9/2022     Requested Prescriptions   Pending Prescriptions Disp Refills     amLODIPine (NORVASC) 10 MG tablet [Pharmacy Med Name: AMLODIPINE BESYLATE 10MG TABLETS] 90 tablet 1     Sig: TAKE 1 TABLET(10 MG) BY MOUTH DAILY AS DIRECTED       Calcium Channel Blockers Protocol  Failed - 3/18/2022  8:08 AM        Failed - Normal serum creatinine on file in past 12 months     Recent Labs   Lab Test 08/02/21  0712   CR 0.65*       Ok to refill medication if creatinine is low          Passed - Blood pressure under 140/90 in past 12 months     BP Readings from Last 3 Encounters:   02/09/22 138/74   08/11/21 112/70   08/02/21 128/74                 Passed - Recent (12 mo) or future (30 days) visit within the authorizing provider's specialty     Patient has had an office visit with the authorizing provider or a provider within the authorizing providers department within the previous 12 mos or has a future within next 30 days. See \"Patient Info\" tab in inbasket, or \"Choose Columns\" in Meds & Orders section of the refill encounter.              Passed - Medication is active on med list        Passed - Patient is age 18 or older             Kayleen Griffin RN 03/18/22 1:45 PM  "

## 2022-04-18 DIAGNOSIS — I10 ESSENTIAL HYPERTENSION: ICD-10-CM

## 2022-04-20 NOTE — TELEPHONE ENCOUNTER
"Routing refill request to provider for review/approval because:  A break in medication    Last Written Prescription Date:  6/17/2021 but it was ended off the medication list      Last Fill Quantity: 90,  # refills: 1   Last office visit provider:  2/9/22     Requested Prescriptions   Pending Prescriptions Disp Refills     lisinopril (ZESTRIL) 20 MG tablet [Pharmacy Med Name: LISINOPRIL 20MG TABLETS] 90 tablet 1     Sig: TAKE 1 TABLET(20 MG) BY MOUTH DAILY       ACE Inhibitors (Including Combos) Protocol Failed - 4/18/2022 11:58 AM        Failed - Normal serum creatinine on file in past 12 months     Recent Labs   Lab Test 08/02/21  0712   CR 0.65*       Ok to refill medication if creatinine is low          Passed - Blood pressure under 140/90 in past 12 months     BP Readings from Last 3 Encounters:   02/09/22 138/74   08/11/21 112/70   08/02/21 128/74                 Passed - Recent (12 mo) or future (30 days) visit within the authorizing provider's specialty     Patient has had an office visit with the authorizing provider or a provider within the authorizing providers department within the previous 12 mos or has a future within next 30 days. See \"Patient Info\" tab in inbasket, or \"Choose Columns\" in Meds & Orders section of the refill encounter.              Passed - Medication is active on med list        Passed - Patient is age 18 or older        Passed - Normal serum potassium on file in past 12 months     Recent Labs   Lab Test 08/02/21  0712   POTASSIUM 3.7                  Eun Hicks RN 04/20/22 10:59 AM  "

## 2022-04-21 RX ORDER — LISINOPRIL 20 MG/1
TABLET ORAL
Qty: 90 TABLET | Refills: 1 | Status: SHIPPED | OUTPATIENT
Start: 2022-04-21 | End: 2022-11-23

## 2022-04-21 NOTE — TELEPHONE ENCOUNTER
Patient is calling to follow up on refill request, he is out of medication.  Last seen 02/2022.  Would appreciate a covering provider to address.

## 2022-04-22 DIAGNOSIS — E11.9 TYPE 2 DIABETES MELLITUS WITHOUT COMPLICATION, WITHOUT LONG-TERM CURRENT USE OF INSULIN (H): ICD-10-CM

## 2022-04-22 NOTE — TELEPHONE ENCOUNTER
Patient is due to follow up for awv and lung cancer screen please schedule. Refill sent so he will not be out.     plz remind him to do eye exam..     Also will need diabetes follow up 5/9/22

## 2022-04-25 RX ORDER — DULAGLUTIDE 1.5 MG/.5ML
INJECTION, SOLUTION SUBCUTANEOUS
Qty: 2 ML | Refills: 0 | Status: SHIPPED | OUTPATIENT
Start: 2022-04-25 | End: 2022-05-23

## 2022-04-25 NOTE — TELEPHONE ENCOUNTER
"Routing refill request to provider for review/approval because:  Labs out of range:  CR  A break in medication     Last Written Prescription Date:  3/31/21  Last Fill Quantity: 30ml,  # refills: 0   Last office visit provider: 2/9/22    Requested Prescriptions   Pending Prescriptions Disp Refills     TRULICITY 1.5 MG/0.5ML pen [Pharmacy Med Name: TRULICITY 1.5MG/0.5ML SDP 0.5ML] 30 mL 0     Sig: INJECT 1.5 MG UNDER THE SKIN EVERY 7 DAYS       GLP-1 Agonists Protocol Failed - 4/22/2022  8:05 AM        Failed - Normal serum creatinine on file in past 12 months     Recent Labs   Lab Test 08/02/21  0712   CR 0.65*       Ok to refill medication if creatinine is low          Passed - HgbA1C in past 3 or 6 months     If HgbA1C is 8 or greater, it needs to be on file within the past 3 months.  If less than 8, must be on file within the past 6 months.     Recent Labs   Lab Test 02/09/22  0759   A1C 7.1*             Passed - Medication is active on med list        Passed - Patient is age 18 or older        Passed - Recent (6 mo) or future (30 days) visit within the authorizing provider's specialty     Patient had office visit in the last 6 months or has a visit in the next 30 days with authorizing provider.  See \"Patient Info\" tab in inbasket, or \"Choose Columns\" in Meds & Orders section of the refill encounter.                 Ave Palencia RN 04/25/22 2:58 PM  "

## 2022-05-09 DIAGNOSIS — N40.1 BENIGN PROSTATIC HYPERPLASIA WITH LOWER URINARY TRACT SYMPTOMS, SYMPTOM DETAILS UNSPECIFIED: ICD-10-CM

## 2022-05-09 DIAGNOSIS — E11.9 TYPE 2 DIABETES MELLITUS WITHOUT COMPLICATION, WITHOUT LONG-TERM CURRENT USE OF INSULIN (H): ICD-10-CM

## 2022-05-11 ENCOUNTER — MYC REFILL (OUTPATIENT)
Dept: FAMILY MEDICINE | Facility: CLINIC | Age: 72
End: 2022-05-11
Payer: COMMERCIAL

## 2022-05-11 DIAGNOSIS — E11.9 TYPE 2 DIABETES MELLITUS WITHOUT COMPLICATION, WITHOUT LONG-TERM CURRENT USE OF INSULIN (H): ICD-10-CM

## 2022-05-11 RX ORDER — TAMSULOSIN HYDROCHLORIDE 0.4 MG/1
CAPSULE ORAL
Qty: 90 CAPSULE | Refills: 2 | Status: SHIPPED | OUTPATIENT
Start: 2022-05-11 | End: 2023-05-15

## 2022-05-11 RX ORDER — GLIPIZIDE 10 MG/1
TABLET, FILM COATED, EXTENDED RELEASE ORAL
Qty: 90 TABLET | Refills: 0 | Status: SHIPPED | OUTPATIENT
Start: 2022-05-11 | End: 2022-08-09

## 2022-05-11 NOTE — TELEPHONE ENCOUNTER
"Outpatient Medication Detail     Disp Refills Start End KENDRICK   glipiZIDE (GLUCOTROL XL) 10 MG 24 hr tablet 90 tablet 2 6/17/2021  No   Sig: TAKE 1 TABLET(10 MG) BY MOUTH DAILY   Sent to pharmacy as: glipiZIDE ER 10 mg tablet, extended release 24 hr (GLUCOTROL XL)   E-Prescribing Status: Receipt confirmed by pharmacy (6/17/2021  8:33 AM CDT)     Last office visit provider:  2/9/22     Requested Prescriptions   Pending Prescriptions Disp Refills     glipiZIDE (GLUCOTROL XL) 10 MG 24 hr tablet [Pharmacy Med Name: GLIPIZIDE ER 10MG TABLETS] 90 tablet 1     Sig: TAKE 1 TABLET(10 MG) BY MOUTH DAILY       Sulfonylurea Agents Failed - 5/11/2022  9:17 AM        Failed - Patient has a recent creatinine (normal) within the past 12 mos.     Recent Labs   Lab Test 08/02/21  0712   CR 0.65*       Ok to refill medication if creatinine is low          Passed - Patient has documented A1c within the specified period of time.     If HgbA1C is 8 or greater, it needs to be on file within the past 3 months.  If less than 8, must be on file within the past 6 months.     Recent Labs   Lab Test 02/09/22  0759   A1C 7.1*             Passed - Medication is active on med list        Passed - Patient is age 18 or older        Passed - Recent (6 mo) or future (30 days) visit within the authorizing provider's specialty     Patient had office visit in the last 6 months or has a visit in the next 30 days with authorizing provider or within the authorizing provider's specialty.  See \"Patient Info\" tab in inbasket, or \"Choose Columns\" in Meds & Orders section of the refill encounter.             Signed Prescriptions Disp Refills    tamsulosin (FLOMAX) 0.4 MG capsule 90 capsule 2     Sig: TAKE 1 CAPSULE(0.4 MG) BY MOUTH DAILY       Alpha Blockers Passed - 5/11/2022  9:17 AM        Passed - Blood pressure under 140/90 in past 12 months     BP Readings from Last 3 Encounters:   02/09/22 138/74   08/11/21 112/70   08/02/21 128/74                 Passed - " "Recent (12 mo) or future (30 days) visit within the authorizing provider's specialty     Patient has had an office visit with the authorizing provider or a provider within the authorizing providers department within the previous 12 mos or has a future within next 30 days. See \"Patient Info\" tab in inbasket, or \"Choose Columns\" in Meds & Orders section of the refill encounter.              Passed - Patient does not have Tadalafil, Vardenafil, or Sildenafil on their medication list        Passed - Medication is active on med list        Passed - Patient is 18 years of age or older             Seth Martinez RN 05/11/22 9:18 AM    "

## 2022-05-11 NOTE — TELEPHONE ENCOUNTER
"Last Written Prescription Date:  2/9/22  Last Fill Quantity: 90,  # refills: 0   Last office visit provider:  2/9/22     Requested Prescriptions   Pending Prescriptions Disp Refills     tamsulosin (FLOMAX) 0.4 MG capsule [Pharmacy Med Name: TAMSULOSIN 0.4MG CAPSULES] 90 capsule 0     Sig: TAKE 1 CAPSULE(0.4 MG) BY MOUTH DAILY       Alpha Blockers Passed - 5/11/2022  9:17 AM        Passed - Blood pressure under 140/90 in past 12 months     BP Readings from Last 3 Encounters:   02/09/22 138/74   08/11/21 112/70   08/02/21 128/74                 Passed - Recent (12 mo) or future (30 days) visit within the authorizing provider's specialty     Patient has had an office visit with the authorizing provider or a provider within the authorizing providers department within the previous 12 mos or has a future within next 30 days. See \"Patient Info\" tab in inbasket, or \"Choose Columns\" in Meds & Orders section of the refill encounter.              Passed - Patient does not have Tadalafil, Vardenafil, or Sildenafil on their medication list        Passed - Medication is active on med list        Passed - Patient is 18 years of age or older           glipiZIDE (GLUCOTROL XL) 10 MG 24 hr tablet [Pharmacy Med Name: GLIPIZIDE ER 10MG TABLETS] 90 tablet 1     Sig: TAKE 1 TABLET(10 MG) BY MOUTH DAILY       Sulfonylurea Agents Failed - 5/11/2022  9:17 AM        Failed - Patient has a recent creatinine (normal) within the past 12 mos.     Recent Labs   Lab Test 08/02/21  0712   CR 0.65*       Ok to refill medication if creatinine is low          Passed - Patient has documented A1c within the specified period of time.     If HgbA1C is 8 or greater, it needs to be on file within the past 3 months.  If less than 8, must be on file within the past 6 months.     Recent Labs   Lab Test 02/09/22  0759   A1C 7.1*             Passed - Medication is active on med list        Passed - Patient is age 18 or older        Passed - Recent (6 mo) or " "future (30 days) visit within the authorizing provider's specialty     Patient had office visit in the last 6 months or has a visit in the next 30 days with authorizing provider or within the authorizing provider's specialty.  See \"Patient Info\" tab in inbasket, or \"Choose Columns\" in Meds & Orders section of the refill encounter.                 Seth Martinez RN 05/11/22 9:17 AM    "

## 2022-05-13 DIAGNOSIS — E11.9 TYPE 2 DIABETES MELLITUS WITHOUT COMPLICATION, WITHOUT LONG-TERM CURRENT USE OF INSULIN (H): ICD-10-CM

## 2022-05-13 RX ORDER — GLIPIZIDE 10 MG/1
10 TABLET, FILM COATED, EXTENDED RELEASE ORAL DAILY
Qty: 90 TABLET | Refills: 1 | OUTPATIENT
Start: 2022-05-13

## 2022-05-15 RX ORDER — GLIPIZIDE 10 MG/1
TABLET, FILM COATED, EXTENDED RELEASE ORAL
Qty: 90 TABLET | Refills: 0 | OUTPATIENT
Start: 2022-05-15

## 2022-05-22 DIAGNOSIS — E11.9 TYPE 2 DIABETES MELLITUS WITHOUT COMPLICATION, WITHOUT LONG-TERM CURRENT USE OF INSULIN (H): ICD-10-CM

## 2022-05-23 RX ORDER — DULAGLUTIDE 1.5 MG/.5ML
INJECTION, SOLUTION SUBCUTANEOUS
Qty: 2 ML | Refills: 0 | Status: SHIPPED | OUTPATIENT
Start: 2022-05-23 | End: 2022-05-25

## 2022-05-23 NOTE — TELEPHONE ENCOUNTER
"Routing refill request to provider for review/approval because:  Labs out of range:      Last Written Prescription Date:  4/25/22  Last Fill Quantity: 2,  # refills: 0   Last office visit provider:  2/9/22     Requested Prescriptions   Pending Prescriptions Disp Refills     TRULICITY 1.5 MG/0.5ML pen [Pharmacy Med Name: TRULICITY 1.5MG/0.5ML SDP 0.5ML] 2 mL 0     Sig: ADMINISTER 1.5 MG UNDER THE SKIN EVERY 7 DAYS       GLP-1 Agonists Protocol Failed - 5/22/2022  9:57 AM        Failed - Normal serum creatinine on file in past 12 months     Recent Labs   Lab Test 08/02/21  0712   CR 0.65*       Ok to refill medication if creatinine is low          Passed - HgbA1C in past 3 or 6 months     If HgbA1C is 8 or greater, it needs to be on file within the past 3 months.  If less than 8, must be on file within the past 6 months.     Recent Labs   Lab Test 02/09/22  0759   A1C 7.1*             Passed - Medication is active on med list        Passed - Patient is age 18 or older        Passed - Recent (6 mo) or future (30 days) visit within the authorizing provider's specialty     Patient had office visit in the last 6 months or has a visit in the next 30 days with authorizing provider.  See \"Patient Info\" tab in inbasket, or \"Choose Columns\" in Meds & Orders section of the refill encounter.                 Jayne Rodgers RN 05/23/22 11:32 AM  "

## 2022-05-25 ENCOUNTER — OFFICE VISIT (OUTPATIENT)
Dept: FAMILY MEDICINE | Facility: CLINIC | Age: 72
End: 2022-05-25
Payer: COMMERCIAL

## 2022-05-25 VITALS
HEIGHT: 69 IN | SYSTOLIC BLOOD PRESSURE: 132 MMHG | TEMPERATURE: 98.2 F | HEART RATE: 78 BPM | RESPIRATION RATE: 16 BRPM | OXYGEN SATURATION: 96 % | WEIGHT: 235.7 LBS | DIASTOLIC BLOOD PRESSURE: 78 MMHG | BODY MASS INDEX: 34.91 KG/M2

## 2022-05-25 DIAGNOSIS — D50.9 MICROCYTIC ANEMIA: ICD-10-CM

## 2022-05-25 DIAGNOSIS — N20.0 CALCULUS OF KIDNEY: ICD-10-CM

## 2022-05-25 DIAGNOSIS — R19.03 RLQ ABDOMINAL MASS: Primary | ICD-10-CM

## 2022-05-25 DIAGNOSIS — E11.9 TYPE 2 DIABETES MELLITUS WITHOUT COMPLICATION, WITHOUT LONG-TERM CURRENT USE OF INSULIN (H): ICD-10-CM

## 2022-05-25 LAB
HBA1C MFR BLD: 8.7 % (ref 0–5.6)
HGB BLD-MCNC: 14.6 G/DL (ref 13.3–17.7)
HOLD SPECIMEN: NORMAL

## 2022-05-25 PROCEDURE — 85018 HEMOGLOBIN: CPT | Performed by: FAMILY MEDICINE

## 2022-05-25 PROCEDURE — 36415 COLL VENOUS BLD VENIPUNCTURE: CPT | Performed by: FAMILY MEDICINE

## 2022-05-25 PROCEDURE — 99214 OFFICE O/P EST MOD 30 MIN: CPT | Performed by: FAMILY MEDICINE

## 2022-05-25 PROCEDURE — 83036 HEMOGLOBIN GLYCOSYLATED A1C: CPT | Performed by: FAMILY MEDICINE

## 2022-05-25 RX ORDER — DULAGLUTIDE 3 MG/.5ML
3 INJECTION, SOLUTION SUBCUTANEOUS WEEKLY
Qty: 6 ML | Refills: 0 | Status: SHIPPED | OUTPATIENT
Start: 2022-05-25 | End: 2022-08-22

## 2022-05-25 RX ORDER — DULAGLUTIDE 1.5 MG/.5ML
1.5 INJECTION, SOLUTION SUBCUTANEOUS
Qty: 2 ML | Refills: 0 | Status: SHIPPED | OUTPATIENT
Start: 2022-05-25 | End: 2022-05-25 | Stop reason: DRUGHIGH

## 2022-05-25 RX ORDER — CAPECITABINE 500 MG/1
TABLET, FILM COATED ORAL
COMMUNITY
Start: 2021-10-12 | End: 2022-05-25

## 2022-05-25 NOTE — PROGRESS NOTES
Assessment & Plan   Problem List Items Addressed This Visit        Endocrine    Type 2 diabetes mellitus without complication (H)     Worsening. a1c worsening and is 8.7 today. Will increase trulicity to 3.0 mg subcutaneous per week.  follow up in 3 months.              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  He says he cant mow the lawn due to right side pain.    Ct abd/ pelvis   Follow up oncology - Chet is over due and says he will schedule this and I did ask him to mention this right side abdominal pain.     Addendum 6/2/2022 CT showed fatty neucrosis likely causing pain. Urology consult placed for incidental finding of uretal stones.            Relevant Orders    CT Abdomen Pelvis w Contrast (Completed)              Follow-up Visit   Expected date: May 25, 2022      Follow Up Appointment Details:     Follow-up with whom?: Me    Follow-Up for what?: Medicare Wellness    Welcome or Annual?: Annual Wellness    How?: In Person    Is this an as-needed follow-up?: No                    Priti Charles MD  [facility]    Chief Complaint   Patient presents with     Abdominal Pain     Notice lump on right side of abdomen near kidney, worst in x2 week, hurt all the time     Refill Request     Would like a 3 months refills on Trulicity because a month supply copay is the same as 3 months supplies        Subjective   Chet is a 71 year old who presents for the following health issues     He says he started noticing pain in the right side with work - such as  "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  He says he cant mow the lawn due to right side pain.      History of Present Illness       Back Pain:  He presents for follow up of back pain. Patient's back pain is a recurring problem.  Location of back pain:  Right lower back and right middle of back  Description of back pain: shooting  Back pain spreads: right side of neck    Since patient first noticed back pain, pain is: gradually worsening  Does back pain interfere with his job:  No      He eats 2-3 servings of fruits and vegetables daily.He exercises with enough effort to increase his heart rate 9 or less minutes per day.  He exercises with enough effort to increase his heart rate 3 or less days per week.   He is taking medications regularly.           Objective    /78 (BP Location: Left arm, Patient Position: Sitting, Cuff Size: Adult Large)   Pulse 78   Temp 98.2  F (36.8  C) (Oral)   Resp 16   Ht 1.753 m (5' 9\")   Wt 106.9 kg (235 lb 11.2 oz)   SpO2 96%   BMI 34.81 kg/m    Body mass index is 34.81 kg/m .  Physical Exam  Constitutional:       Appearance: Normal appearance.   HENT:      Head: Normocephalic and atraumatic.   Cardiovascular:      Rate and Rhythm: Normal rate and regular rhythm.      Heart sounds: Normal heart sounds.   Pulmonary:      Effort: Pulmonary effort is normal.      Breath sounds: Normal breath sounds.   Abdominal:      General: Bowel sounds are normal.      Palpations: Abdomen is soft. There is mass (right side belly there is a palpable mass - ill defined, possibly the size of a small orange.  his stomach seems generally larger on the right than the left but this is chronic and not new. ).   Musculoskeletal:         General: Normal range of motion.      Cervical back: Normal range of motion and neck supple.   Neurological:      General: No focal deficit present.      Mental Status: He is alert.                  "

## 2022-05-28 ENCOUNTER — HOSPITAL ENCOUNTER (EMERGENCY)
Facility: CLINIC | Age: 72
End: 2022-05-28
Payer: COMMERCIAL

## 2022-05-28 ENCOUNTER — HOSPITAL ENCOUNTER (OUTPATIENT)
Dept: CT IMAGING | Facility: CLINIC | Age: 72
Discharge: HOME | End: 2022-05-28
Attending: FAMILY MEDICINE | Admitting: FAMILY MEDICINE
Payer: COMMERCIAL

## 2022-05-28 DIAGNOSIS — R19.03 RLQ ABDOMINAL MASS: ICD-10-CM

## 2022-05-28 LAB
CREAT BLD-MCNC: 0.6 MG/DL (ref 0.7–1.3)
GFR SERPL CREATININE-BSD FRML MDRD: >60 ML/MIN/1.73M2

## 2022-05-28 PROCEDURE — 250N000011 HC RX IP 250 OP 636: Performed by: FAMILY MEDICINE

## 2022-05-28 PROCEDURE — 82565 ASSAY OF CREATININE: CPT

## 2022-05-28 PROCEDURE — 74177 CT ABD & PELVIS W/CONTRAST: CPT

## 2022-05-28 RX ORDER — IOPAMIDOL 755 MG/ML
100 INJECTION, SOLUTION INTRAVASCULAR ONCE
Status: COMPLETED | OUTPATIENT
Start: 2022-05-28 | End: 2022-05-28

## 2022-05-28 RX ADMIN — IOPAMIDOL 100 ML: 755 INJECTION, SOLUTION INTRAVENOUS at 17:39

## 2022-05-31 ENCOUNTER — TELEPHONE (OUTPATIENT)
Dept: FAMILY MEDICINE | Facility: CLINIC | Age: 72
End: 2022-05-31
Payer: COMMERCIAL

## 2022-05-31 NOTE — TELEPHONE ENCOUNTER
----- Message from Afshan Abreu MD sent at 5/31/2022 10:05 AM CDT -----  Call pt - CT shows abdominal lump is due to a fatty deposit.  He also has left sided kidney stone with smaller stones in bladder so would recommend urology consult. I can place order as Priti Charles gone until Friday.

## 2022-05-31 NOTE — TELEPHONE ENCOUNTER
Left message to call back for: Ceht  Information to relay to patient: Please notify patient of results when he returns call. Thank you.

## 2022-06-02 PROBLEM — R19.03 RLQ ABDOMINAL MASS: Status: ACTIVE | Noted: 2022-06-02

## 2022-06-02 NOTE — ASSESSMENT & PLAN NOTE
He says he started noticing pain in the right side with work - such as 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  He says he cant mow the lawn due to right side pain.    Ct abd/ pelvis   Follow up oncology - Chet is over due and says he will schedule this and I did ask him to mention this right side abdominal pain.     Addendum 6/2/2022 CT showed fatty neucrosis likely causing pain. Urology consult placed for incidental finding of uretal stones.

## 2022-06-02 NOTE — ASSESSMENT & PLAN NOTE
Worsening. a1c worsening and is 8.7 today. Will increase trulicity to 3.0 mg subcutaneous per week.  follow up in 3 months.

## 2022-06-03 NOTE — TELEPHONE ENCOUNTER
Spoke with patient ok for referral to urology please please referral they are aware someone will call them

## 2022-06-14 ENCOUNTER — MYC MEDICAL ADVICE (OUTPATIENT)
Dept: FAMILY MEDICINE | Facility: CLINIC | Age: 72
End: 2022-06-14
Payer: COMMERCIAL

## 2022-06-23 ENCOUNTER — TELEPHONE (OUTPATIENT)
Dept: PHARMACY | Facility: CLINIC | Age: 72
End: 2022-06-23

## 2022-06-23 NOTE — TELEPHONE ENCOUNTER
We are applying Laisha for [employer] [facility] in order to receive free Trulicity and she mentioned that Chet would be interested as well.  I completed the [employer] [facility] form for him and faxed it to [place] this morning.      Please have Dr. Charles sign the form and have the patient come in to sign.  Then fax to [facility] [facility].  Please let me know when this is complete.  Thank you!    Haritha Mclaughlin, Pharm.D., BCACP   Medication Therapy Management Pharmacist   [facility]

## 2022-06-24 ENCOUNTER — TELEPHONE (OUTPATIENT)
Dept: FAMILY MEDICINE | Facility: CLINIC | Age: 72
End: 2022-06-24

## 2022-06-24 NOTE — TELEPHONE ENCOUNTER
Forms at  awaiting signature from pt.   Once signed please return to Tomás or Becca for faxing and send to scanning

## 2022-06-24 NOTE — TELEPHONE ENCOUNTER
Left VM for pt to come into clinic and sign forms from [employer].  Dr. Charles had signed it, but it also require patient's signature.  Form at [facility] desk.

## 2022-06-27 NOTE — TELEPHONE ENCOUNTER
[facility] calling to report that page 5 (prescription information) of application was not signed by provider. If original application is not available to sign and resend, a signed hard copy of the prescription can be substituted. Please fax to [phone].

## 2022-06-28 NOTE — TELEPHONE ENCOUNTER
Fax received today from [employer] [facility] that marina has been enrolled until end of 2022  Forms sent to be scanned into chart

## 2022-06-30 DIAGNOSIS — E78.2 MIXED HYPERLIPIDEMIA: ICD-10-CM

## 2022-07-01 RX ORDER — ATORVASTATIN CALCIUM 80 MG/1
TABLET, FILM COATED ORAL
Qty: 90 TABLET | Refills: 1 | Status: SHIPPED | OUTPATIENT
Start: 2022-07-01 | End: 2023-01-03

## 2022-07-01 NOTE — TELEPHONE ENCOUNTER
"Routing refill request to provider for review/approval because:  Labs not current:  ldl    Last Written Prescription Date:  1/3/22  Last Fill Quantity: 90,  # refills: 1   Last office visit provider:  5/25/22     Requested Prescriptions   Pending Prescriptions Disp Refills     atorvastatin (LIPITOR) 80 MG tablet [Pharmacy Med Name: ATORVASTATIN 80MG TABLETS] 90 tablet 1     Sig: TAKE 1 TABLET(80 MG) BY MOUTH DAILY       Statins Protocol Failed - 6/30/2022  9:18 AM        Failed - LDL on file in past 12 months     Recent Labs   Lab Test 06/11/21  0659   LDL 49             Passed - No abnormal creatine kinase in past 12 months     No lab results found.             Passed - Recent (12 mo) or future (30 days) visit within the authorizing provider's specialty     Patient has had an office visit with the authorizing provider or a provider within the authorizing providers department within the previous 12 mos or has a future within next 30 days. See \"Patient Info\" tab in inbasket, or \"Choose Columns\" in Meds & Orders section of the refill encounter.              Passed - Medication is active on med list        Passed - Patient is age 18 or older             Jayne Rodgers, RN 06/30/22 9:15 PM  "

## 2022-07-17 ENCOUNTER — HEALTH MAINTENANCE LETTER (OUTPATIENT)
Age: 72
End: 2022-07-17

## 2022-07-25 ENCOUNTER — TRANSFERRED RECORDS (OUTPATIENT)
Dept: HEALTH INFORMATION MANAGEMENT | Facility: CLINIC | Age: 72
End: 2022-07-25

## 2022-08-02 ENCOUNTER — TRANSFERRED RECORDS (OUTPATIENT)
Dept: HEALTH INFORMATION MANAGEMENT | Facility: CLINIC | Age: 72
End: 2022-08-02

## 2022-08-08 DIAGNOSIS — E11.9 TYPE 2 DIABETES MELLITUS WITHOUT COMPLICATION, WITHOUT LONG-TERM CURRENT USE OF INSULIN (H): ICD-10-CM

## 2022-08-09 RX ORDER — GLIPIZIDE 10 MG/1
TABLET, FILM COATED, EXTENDED RELEASE ORAL
Qty: 90 TABLET | Refills: 0 | Status: SHIPPED | OUTPATIENT
Start: 2022-08-09 | End: 2022-11-14

## 2022-08-09 NOTE — TELEPHONE ENCOUNTER
"Routing refill request to provider for review/approval because:  Labs out of range:  cr    Last Written Prescription Date:  5/11/22  Last Fill Quantity: 90,  # refills: 0   Last office visit provider:  5/25/22     Requested Prescriptions   Pending Prescriptions Disp Refills     glipiZIDE (GLUCOTROL XL) 10 MG 24 hr tablet [Pharmacy Med Name: GLIPIZIDE ER 10MG TABLETS] 90 tablet 0     Sig: TAKE 1 TABLET(10 MG) BY MOUTH DAILY       Sulfonylurea Agents Failed - 8/8/2022  3:23 PM        Failed - Patient has a recent creatinine (normal) within the past 12 mos.     Recent Labs   Lab Test 05/28/22  1734   CR 0.6*       Ok to refill medication if creatinine is low          Passed - Patient has documented A1c within the specified period of time.     If HgbA1C is 8 or greater, it needs to be on file within the past 3 months.  If less than 8, must be on file within the past 6 months.     Recent Labs   Lab Test 05/25/22  1539   A1C 8.7*             Passed - Medication is active on med list        Passed - Patient is age 18 or older        Passed - Recent (6 mo) or future (30 days) visit within the authorizing provider's specialty     Patient had office visit in the last 6 months or has a visit in the next 30 days with authorizing provider or within the authorizing provider's specialty.  See \"Patient Info\" tab in inbasket, or \"Choose Columns\" in Meds & Orders section of the refill encounter.                 Jayne Rodgers RN 08/08/22 8:13 PM  "

## 2022-08-10 ENCOUNTER — MYC MEDICAL ADVICE (OUTPATIENT)
Dept: FAMILY MEDICINE | Facility: CLINIC | Age: 72
End: 2022-08-10

## 2022-08-10 NOTE — TELEPHONE ENCOUNTER
Left message to call back for: Chet  Information to relay to patient: No appts available at [facility]. Please offer patient Wilder Gonsalves CNP at [facility].

## 2022-08-11 ENCOUNTER — OFFICE VISIT (OUTPATIENT)
Dept: INTERNAL MEDICINE | Facility: CLINIC | Age: 72
End: 2022-08-11
Payer: COMMERCIAL

## 2022-08-11 VITALS
SYSTOLIC BLOOD PRESSURE: 138 MMHG | WEIGHT: 233.5 LBS | HEART RATE: 94 BPM | HEIGHT: 69 IN | OXYGEN SATURATION: 99 % | BODY MASS INDEX: 34.58 KG/M2 | RESPIRATION RATE: 19 BRPM | DIASTOLIC BLOOD PRESSURE: 82 MMHG

## 2022-08-11 DIAGNOSIS — E11.9 TYPE 2 DIABETES MELLITUS WITHOUT COMPLICATION, WITHOUT LONG-TERM CURRENT USE OF INSULIN (H): ICD-10-CM

## 2022-08-11 DIAGNOSIS — E78.2 MIXED HYPERLIPIDEMIA: ICD-10-CM

## 2022-08-11 DIAGNOSIS — I10 ESSENTIAL HYPERTENSION: ICD-10-CM

## 2022-08-11 DIAGNOSIS — N20.1 CALCULUS OF URETER: ICD-10-CM

## 2022-08-11 DIAGNOSIS — Z01.818 PREOPERATIVE EXAMINATION: Primary | ICD-10-CM

## 2022-08-11 DIAGNOSIS — Z85.528 HX OF RENAL CELL CANCER: ICD-10-CM

## 2022-08-11 DIAGNOSIS — C18.9 MALIGNANT NEOPLASM OF COLON, UNSPECIFIED PART OF COLON (H): ICD-10-CM

## 2022-08-11 LAB
ANION GAP SERPL CALCULATED.3IONS-SCNC: 14 MMOL/L (ref 7–15)
BUN SERPL-MCNC: 10.1 MG/DL (ref 8–23)
CALCIUM SERPL-MCNC: 9.7 MG/DL (ref 8.8–10.2)
CHLORIDE SERPL-SCNC: 101 MMOL/L (ref 98–107)
CREAT SERPL-MCNC: 0.61 MG/DL (ref 0.67–1.17)
DEPRECATED HCO3 PLAS-SCNC: 23 MMOL/L (ref 22–29)
GFR SERPL CREATININE-BSD FRML MDRD: >90 ML/MIN/1.73M2
GLUCOSE SERPL-MCNC: 232 MG/DL (ref 70–99)
HGB BLD-MCNC: 14.9 G/DL (ref 13.3–17.7)
POTASSIUM SERPL-SCNC: 4.1 MMOL/L (ref 3.4–5.3)
SODIUM SERPL-SCNC: 138 MMOL/L (ref 136–145)

## 2022-08-11 PROCEDURE — 93005 ELECTROCARDIOGRAM TRACING: CPT | Performed by: NURSE PRACTITIONER

## 2022-08-11 PROCEDURE — 85018 HEMOGLOBIN: CPT | Performed by: NURSE PRACTITIONER

## 2022-08-11 PROCEDURE — 36415 COLL VENOUS BLD VENIPUNCTURE: CPT | Performed by: NURSE PRACTITIONER

## 2022-08-11 PROCEDURE — 93010 ELECTROCARDIOGRAM REPORT: CPT | Performed by: INTERNAL MEDICINE

## 2022-08-11 PROCEDURE — 80048 BASIC METABOLIC PNL TOTAL CA: CPT | Performed by: NURSE PRACTITIONER

## 2022-08-11 PROCEDURE — 99214 OFFICE O/P EST MOD 30 MIN: CPT | Performed by: NURSE PRACTITIONER

## 2022-08-11 NOTE — PROGRESS NOTES
[facility]  [address] [address]  Phone: [phone]  Fax: [phone]  Primary Provider: Priti Charles  Pre-op Performing Provider: SG GONSALES    PREOPERATIVE EVALUATION:  Today's date: 8/11/2022    Regis Brooke is a 72 year old male who presents for a preoperative evaluation.    Surgical Information:  Surgery/Procedure: CYSTOSCOPY, LEFT URETEROSCOPY, LASER LITHOTRIPSY, LEFT RETROGRADE PYELOGRAM, POSSIBLE RIGHT URETEROSCOPY, POSSIBLE RIGHT URETERAL STENT PLACEMENT, POSSIBLE RIGHT RETROGRADE PYELOGRAM  Surgery Location: [facility] [place]  Surgeon: Jason Zuniga MD  Surgery Date: 8/16/2022  Time of Surgery: 7:20  Where patient plans to recover: At home with family  Fax number for surgical facility: Note does not need to be faxed, will be available electronically in Epic.    Type of Anesthesia Anticipated: to be determined    Assessment & Plan     The proposed surgical procedure is considered LOW risk.    Preoperative examination  No contraindications for planned procedure.  EKG personally interpreted as normal sinus rhythm with no ST changes    - EKG 12-lead, tracing only  - BASIC METABOLIC PANEL; Future  - Hemoglobin; Future  - Asymptomatic COVID-19 Virus (Coronavirus) by PCR; Future    Calculus of ureter  Planned cystoscopy, ureteroscopy, laser lithotripsy, possible stent placement    Type 2 diabetes mellitus without complication, without long-term current use of insulin (H)  Mildly uncontrolled with A1c 8.7 when checked 2 months ago.  On Trulicity, metformin, and glipizide.  He will hold his metformin and glipizide on the morning of his procedure    Mixed hyperlipidemia  No history of CVA or MI.  On atorvastatin and full-strength aspirin    Hypertension  He uses lisinopril and amlodipine.  Blood pressure 146/80 today.  He is going to hold his lisinopril the morning of his procedure.  Check a BMP today    Malignant neoplasm of colon,  unspecified part of colon (H)  Eating and drinking normally with normal bowel movements.    Hx of renal cell cancer  Stable    Patient Instructions   Hold all supplements, aspirin and NSAIDs for 7 days prior to surgery.    Do not take your metformin or glipizide in the morning of your procedure.    Do not take your lisinopril on the morning of your procedure.     Follow your surgeon's direction on when to stop eating and drinking prior to surgery.    Your surgeon will be managing your pain after your surgery.      Remove all jewelry and metal piercings before your surgery.     Remove nail polish from fingers before surgery.    If you use a CPAP machine, bring this with you to surgery.                 Risks and Recommendations:  The patient has the following additional risks and recommendations for perioperative complications:   - No identified additional risk factors other than previously addressed    Medication Instructions:  As above    RECOMMENDATION:  APPROVAL GIVEN to proceed with proposed procedure, without further diagnostic evaluation.      20 minutes spent on the date of the encounter doing chart review, history and exam, documentation and further activities per the note      Subjective     HPI related to upcoming procedure: As above    Preop Questions 8/11/2022   1. Have you ever had a heart attack or stroke? No   2. Have you ever had surgery on your heart or blood vessels, such as a stent placement, a coronary artery bypass, or surgery on an artery in your head, neck, heart, or legs? No   3. Do you have chest pain with activity? No   4. Do you have a history of  heart failure? No   5. Do you currently have a cold, bronchitis or symptoms of other infection? No   6. Do you have a cough, shortness of breath, or wheezing? No   7. Do you or anyone in your family have previous history of blood clots? No   8. Do you or does anyone in your family have a serious bleeding problem such as prolonged bleeding  following surgeries or cuts? No   9. Have you ever had problems with anemia or been told to take iron pills? YES - on ferrous sulfate   10. Have you had any abnormal blood loss such as black, tarry or bloody stools? No   11. Have you ever had a blood transfusion? No   12. Are you willing to have a blood transfusion if it is medically needed before, during, or after your surgery? Yes   13. Have you or any of your relatives ever had problems with anesthesia? No   14. Do you have sleep apnea, excessive snoring or daytime drowsiness? No   15. Do you have any artifical heart valves or other implanted medical devices like a pacemaker, defibrillator, or continuous glucose monitor? No   16. Do you have artificial joints? No   17. Are you allergic to latex? No       Health Care Directive:  Patient does not have a Health Care Directive or Living Will: Discussed advance care planning with patient; however, patient declined at this time.    Preoperative Review of :   reviewed - no record of controlled substances prescribed.    Status of Chronic Conditions:  See problem list for active medical problems.  Problems all longstanding and stable, except as noted/documented.  See ROS for pertinent symptoms related to these conditions.      Review of Systems  CONSTITUTIONAL: NEGATIVE for fever, chills, change in weight  INTEGUMENTARY/SKIN: NEGATIVE for worrisome rashes, moles or lesions  EYES: NEGATIVE for vision changes or irritation  ENT/MOUTH: NEGATIVE for ear, mouth and throat problems  RESP: NEGATIVE for significant cough or SOB  CV: NEGATIVE for chest pain, palpitations or peripheral edema  GI: NEGATIVE for nausea, abdominal pain, heartburn, or change in bowel habits  : NEGATIVE for frequency, dysuria, or hematuria  MUSCULOSKELETAL: NEGATIVE for significant arthralgias or myalgia  NEURO: NEGATIVE for weakness, dizziness or paresthesias  ENDOCRINE: NEGATIVE for temperature intolerance, skin/hair changes  HEME: NEGATIVE for  bleeding problems  PSYCHIATRIC: NEGATIVE for changes in mood or affect    Patient Active Problem List    Diagnosis Date Noted     RLQ abdominal mass 06/02/2022     Priority: Medium     Benign prostatic hyperplasia with lower urinary tract symptoms, symptom details unspecified 02/09/2022     Priority: Medium     Class 1 obesity due to excess calories with body mass index (BMI) of 33.0 to 33.9 in adult 02/09/2022     Priority: Medium     Bilateral impacted cerumen 08/11/2021     Priority: Medium     Colon cancer (H) 07/30/2021     Priority: Medium     Malignant neoplasm of ascending colon (H) 07/21/2021     Priority: Medium     Hypertension      Priority: Medium     Lisinopril and norvasc.  Replacement Utility updated for latest IMO load         Type 2 diabetes mellitus without complication (H)      Priority: Medium     Created by Conversion         Hyperlipidemia      Priority: Medium     Created by Conversion         Vitamin D Deficiency      Priority: Medium     Created by Conversion  Replacement Utility updated for latest IMO load         Cognitive deficits 11/28/2019     Priority: Medium     Microcytic anemia 11/28/2019     Priority: Medium     Pain in both hands 10/25/2017     Priority: Medium     Wears hearing aid - bilateral 06/20/2017     Priority: Medium     Hx of renal cell cancer      Priority: Medium     Created by Conversion  [facility] Annotation: May 29 2011  4:34PM - Negar Lawler: RIGHT KIDNEY   S/P   PARTIAL NEPHRECTOMY, 11-2-2011  Replacement Utility updated for latest IMO load         Preventative health care 05/04/2016     Priority: Medium     Nephrolithiasis Of The Left Kidney      Priority: Medium     Created by Conversion         Diverticular disease of colon 09/16/2009     Priority: Medium      Past Medical History:   Diagnosis Date     Calculus of kidney     Created by Conversion      Cognitive deficits     wife states not prominent     Essential hypertension     Lisinopril and norvasc.   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)     Created by Conversion [facility] Annotation: May 29 2011  4:34PM - Negar Lawler: RIGHT KIDNEY S/P  PARTIAL NEPHRECTOMY, 11-2-2011  Replacement Utility updated for latest IMO load     Type 2 diabetes mellitus without complication (H)     Created by Conversion      Vitamin D deficiency     Created by Conversion  Replacement Utility updated for latest IMO load     Past Surgical History:   Procedure Laterality Date     COLECTOMY RIGHT N/A 7/30/2021    Procedure: OPEN RIGHT COLECTOMY;  Surgeon: Ana Dorsey MD;  Location: [facility] [place]     COMBINED CYSTOSCOPY, INSERT STENT URETER(S) Right 7/30/2021    Procedure: CYSTOSCOPY, RIGHT STENT AND URETERAL CATHETERIZATION;  Surgeon: Wyatt Mcnulty MD;  Location: [facility] [place]     HC REMOVAL GALLBLADDER      Description: Cholecystectomy;  Recorded: 05/05/2008;     HC REPAIR UMBILICAL RITU,<6Y/O,REDUC      Description: Umbilical Hernia Repair;  Recorded: 05/05/2008;     IR LUMBAR EPIDURAL STEROID INJECTION  11/6/2008     LAPAROSCOPIC ASSISTED COLECTOMY N/A 7/30/2021    Procedure: LAPAROSCOPIC;  Surgeon: Ana Dorsey MD;  Location: [facility] [place]     ZZC REMV KIDNEY,W/RIB RESECTION      Description: Nephrectomy;  Recorded: 09/12/2011;  Comments: RIGHT PARTIAL NEPHRECTOMY, 11/2010     Current Outpatient Medications   Medication Sig Dispense Refill     amLODIPine (NORVASC) 10 MG tablet TAKE 1 TABLET(10 MG) BY MOUTH DAILY AS DIRECTED 90 tablet 3     aspirin (ASA) 325 MG EC tablet Take 325 mg by mouth daily       atorvastatin (LIPITOR) 80 MG tablet TAKE 1 TABLET(80 MG) BY MOUTH DAILY 90 tablet 1     calcium carbonate (OS-EDER) 500 MG tablet Take 1 tablet by mouth daily       CRANBERRY EXTRACT (CRANBERRY ORAL) [CRANBERRY EXTRACT (CRANBERRY ORAL)] Take by mouth.        "cyanocobalamin (VITAMIN B-12) 100 MCG tablet Take 100 mcg by mouth daily       Dulaglutide (TRULICITY) 3 MG/0.5ML SOPN Inject 3 mg Subcutaneous once a week 6 mL 0     ferrous sulfate 325 (65 FE) MG tablet [FERROUS SULFATE 325 (65 FE) MG TABLET] Take 1 tablet (325 mg total) by mouth daily. 30 tablet 0     glipiZIDE (GLUCOTROL XL) 10 MG 24 hr tablet TAKE 1 TABLET(10 MG) BY MOUTH DAILY 90 tablet 0     lisinopril (ZESTRIL) 20 MG tablet TAKE 1 TABLET(20 MG) BY MOUTH DAILY 90 tablet 1     metFORMIN (GLUCOPHAGE) 500 MG tablet [METFORMIN (GLUCOPHAGE) 500 MG TABLET] TAKE 2 TABLETS(1000 MG) BY MOUTH TWICE DAILY WITH MEALS 360 tablet 0     MULTIVITAMIN ORAL Take 1 tablet by mouth daily        ONETOUCH VERIO IQ test strip USE AND DISCARD 1 TEST     STRIP 3 TIMES DAILY AS     DIRECTED. 300 strip 3     tamsulosin (FLOMAX) 0.4 MG capsule TAKE 1 CAPSULE(0.4 MG) BY MOUTH DAILY 90 capsule 2     Vitamin D3 (CHOLECALCIFEROL) 125 MCG (5000 UT) tablet Take 125 mcg by mouth daily         Allergies   Allergen Reactions     Morphine Nausea and Vomiting        Social History     Tobacco Use     Smoking status: Former Smoker     Smokeless tobacco: Never Used   Substance Use Topics     Alcohol use: Yes     Alcohol/week: 1.0 standard drink     Types: 1 Glasses of wine per week     No family history on file.  History   Drug Use Unknown         Objective     BP (!) 146/80   Pulse 94   Resp 19   Ht 1.753 m (5' 9\")   Wt 105.9 kg (233 lb 8 oz)   SpO2 99%   BMI 34.48 kg/m      Physical Exam    GENERAL APPEARANCE: healthy, alert and no distress     EYES: EOMI,  PERRL     HENT: ear canals and TM's normal and nose and mouth without ulcers or lesions     NECK: no adenopathy, no asymmetry, masses, or scars and thyroid normal to palpation     RESP: lungs clear to auscultation - no rales, rhonchi or wheezes     CV: regular rates and rhythm, normal S1 S2, no S3 or S4 and no murmur, click or rub     ABDOMEN:  soft, nontender, no HSM or masses and bowel " sounds normal     MS: extremities normal- no gross deformities noted, no evidence of inflammation in joints, FROM in all extremities.     SKIN: no suspicious lesions or rashes     NEURO: Normal strength and tone, sensory exam grossly normal, mentation intact and speech normal     PSYCH: mentation appears normal. and affect normal/bright     LYMPHATICS: No cervical adenopathy    Recent Labs   Lab Test 05/28/22  1734 05/25/22  1539 02/09/22  0759 08/11/21  1342 08/02/21  0712 08/01/21  0746 07/31/21  0758   HGB  --  14.6  --  9.5*  --  9.4* 8.4*   PLT  --   --   --  367  --  395 385   NA  --   --   --   --  138  --  137   POTASSIUM  --   --   --   --  3.7  --  3.7   CR 0.6*  --   --   --  0.65*  --  0.60*   A1C  --  8.7* 7.1*  --   --   --   --         Diagnostics:  Labs pending at this time.  Results will be reviewed when available.     Revised Cardiac Risk Index (RCRI):  The patient has the following serious cardiovascular risks for perioperative complications:   - No serious cardiac risks = 0 points     RCRI Interpretation: 1 point: Class II (low risk - 0.9% complication rate)           Signed Electronically by: Ricky Gonsalves CNP  Copy of this evaluation report is provided to requesting physician.

## 2022-08-11 NOTE — PATIENT INSTRUCTIONS
Hold all supplements, aspirin and NSAIDs for 7 days prior to surgery.    Do not take your metformin or glipizide in the morning of your procedure.    Do not take your lisinopril on the morning of your procedure.     Follow your surgeon's direction on when to stop eating and drinking prior to surgery.    Your surgeon will be managing your pain after your surgery.      Remove all jewelry and metal piercings before your surgery.     Remove nail polish from fingers before surgery.    If you use a CPAP machine, bring this with you to surgery.

## 2022-08-11 NOTE — H&P (VIEW-ONLY)
[facility]  [address] [address]  Phone: [phone]  Fax: [phone]  Primary Provider: Priti Charles  Pre-op Performing Provider: SG GONSALES    PREOPERATIVE EVALUATION:  Today's date: 8/11/2022    Regis Brooke is a 72 year old male who presents for a preoperative evaluation.    Surgical Information:  Surgery/Procedure: CYSTOSCOPY, LEFT URETEROSCOPY, LASER LITHOTRIPSY, LEFT RETROGRADE PYELOGRAM, POSSIBLE RIGHT URETEROSCOPY, POSSIBLE RIGHT URETERAL STENT PLACEMENT, POSSIBLE RIGHT RETROGRADE PYELOGRAM  Surgery Location: [facility]  Surgeon: Jason Zuniga MD  Surgery Date: 8/16/2022  Time of Surgery: 7:20  Where patient plans to recover: At home with family  Fax number for surgical facility: Note does not need to be faxed, will be available electronically in Epic.    Type of Anesthesia Anticipated: to be determined    Assessment & Plan     The proposed surgical procedure is considered LOW risk.    Preoperative examination  No contraindications for planned procedure.  EKG personally interpreted as normal sinus rhythm with no ST changes    - EKG 12-lead, tracing only  - BASIC METABOLIC PANEL; Future  - Hemoglobin; Future  - Asymptomatic COVID-19 Virus (Coronavirus) by PCR; Future    Calculus of ureter  Planned cystoscopy, ureteroscopy, laser lithotripsy, possible stent placement    Type 2 diabetes mellitus without complication, without long-term current use of insulin (H)  Mildly uncontrolled with A1c 8.7 when checked 2 months ago.  On Trulicity, metformin, and glipizide.  He will hold his metformin and glipizide on the morning of his procedure    Mixed hyperlipidemia  No history of CVA or MI.  On atorvastatin and full-strength aspirin    Hypertension  He uses lisinopril and amlodipine.  Blood pressure 146/80 today.  He is going to hold his lisinopril the morning of his procedure.  Check a BMP today    Malignant neoplasm of colon,  unspecified part of colon (H)  Eating and drinking normally with normal bowel movements.    Hx of renal cell cancer  Stable    Patient Instructions   Hold all supplements, aspirin and NSAIDs for 7 days prior to surgery.    Do not take your metformin or glipizide in the morning of your procedure.    Do not take your lisinopril on the morning of your procedure.     Follow your surgeon's direction on when to stop eating and drinking prior to surgery.    Your surgeon will be managing your pain after your surgery.      Remove all jewelry and metal piercings before your surgery.     Remove nail polish from fingers before surgery.    If you use a CPAP machine, bring this with you to surgery.                 Risks and Recommendations:  The patient has the following additional risks and recommendations for perioperative complications:   - No identified additional risk factors other than previously addressed    Medication Instructions:  As above    RECOMMENDATION:  APPROVAL GIVEN to proceed with proposed procedure, without further diagnostic evaluation.      20 minutes spent on the date of the encounter doing chart review, history and exam, documentation and further activities per the note      Subjective     HPI related to upcoming procedure: As above    Preop Questions 8/11/2022   1. Have you ever had a heart attack or stroke? No   2. Have you ever had surgery on your heart or blood vessels, such as a stent placement, a coronary artery bypass, or surgery on an artery in your head, neck, heart, or legs? No   3. Do you have chest pain with activity? No   4. Do you have a history of  heart failure? No   5. Do you currently have a cold, bronchitis or symptoms of other infection? No   6. Do you have a cough, shortness of breath, or wheezing? No   7. Do you or anyone in your family have previous history of blood clots? No   8. Do you or does anyone in your family have a serious bleeding problem such as prolonged bleeding  following surgeries or cuts? No   9. Have you ever had problems with anemia or been told to take iron pills? YES - on ferrous sulfate   10. Have you had any abnormal blood loss such as black, tarry or bloody stools? No   11. Have you ever had a blood transfusion? No   12. Are you willing to have a blood transfusion if it is medically needed before, during, or after your surgery? Yes   13. Have you or any of your relatives ever had problems with anesthesia? No   14. Do you have sleep apnea, excessive snoring or daytime drowsiness? No   15. Do you have any artifical heart valves or other implanted medical devices like a pacemaker, defibrillator, or continuous glucose monitor? No   16. Do you have artificial joints? No   17. Are you allergic to latex? No       Health Care Directive:  Patient does not have a Health Care Directive or Living Will: Discussed advance care planning with patient; however, patient declined at this time.    Preoperative Review of :   reviewed - no record of controlled substances prescribed.    Status of Chronic Conditions:  See problem list for active medical problems.  Problems all longstanding and stable, except as noted/documented.  See ROS for pertinent symptoms related to these conditions.      Review of Systems  CONSTITUTIONAL: NEGATIVE for fever, chills, change in weight  INTEGUMENTARY/SKIN: NEGATIVE for worrisome rashes, moles or lesions  EYES: NEGATIVE for vision changes or irritation  ENT/MOUTH: NEGATIVE for ear, mouth and throat problems  RESP: NEGATIVE for significant cough or SOB  CV: NEGATIVE for chest pain, palpitations or peripheral edema  GI: NEGATIVE for nausea, abdominal pain, heartburn, or change in bowel habits  : NEGATIVE for frequency, dysuria, or hematuria  MUSCULOSKELETAL: NEGATIVE for significant arthralgias or myalgia  NEURO: NEGATIVE for weakness, dizziness or paresthesias  ENDOCRINE: NEGATIVE for temperature intolerance, skin/hair changes  HEME: NEGATIVE for  bleeding problems  PSYCHIATRIC: NEGATIVE for changes in mood or affect    Patient Active Problem List    Diagnosis Date Noted     RLQ abdominal mass 06/02/2022     Priority: Medium     Benign prostatic hyperplasia with lower urinary tract symptoms, symptom details unspecified 02/09/2022     Priority: Medium     Class 1 obesity due to excess calories with body mass index (BMI) of 33.0 to 33.9 in adult 02/09/2022     Priority: Medium     Bilateral impacted cerumen 08/11/2021     Priority: Medium     Colon cancer (H) 07/30/2021     Priority: Medium     Malignant neoplasm of ascending colon (H) 07/21/2021     Priority: Medium     Hypertension      Priority: Medium     Lisinopril and norvasc.  Replacement Utility updated for latest IMO load         Type 2 diabetes mellitus without complication (H)      Priority: Medium     Created by Conversion         Hyperlipidemia      Priority: Medium     Created by Conversion         Vitamin D Deficiency      Priority: Medium     Created by Conversion  Replacement Utility updated for latest IMO load         Cognitive deficits 11/28/2019     Priority: Medium     Microcytic anemia 11/28/2019     Priority: Medium     Pain in both hands 10/25/2017     Priority: Medium     Wears hearing aid - bilateral 06/20/2017     Priority: Medium     Hx of renal cell cancer      Priority: Medium     Created by Conversion  [facility] Annotation: May 29 2011  4:34PM - Negar Lawler: RIGHT KIDNEY   S/P   PARTIAL NEPHRECTOMY, 11-2-2011  Replacement Utility updated for latest IMO load         Preventative health care 05/04/2016     Priority: Medium     Nephrolithiasis Of The Left Kidney      Priority: Medium     Created by Conversion         Diverticular disease of colon 09/16/2009     Priority: Medium      Past Medical History:   Diagnosis Date     Calculus of kidney     Created by Conversion      Cognitive deficits     wife states not prominent     Essential hypertension     Lisinopril and norvasc.   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)     Created by Conversion [facility] Annotation: May 29 2011  4:34PM - Negar Lawler: RIGHT KIDNEY S/P  PARTIAL NEPHRECTOMY, 11-2-2011  Replacement Utility updated for latest IMO load     Type 2 diabetes mellitus without complication (H)     Created by Conversion      Vitamin D deficiency     Created by Conversion  Replacement Utility updated for latest IMO load     Past Surgical History:   Procedure Laterality Date     COLECTOMY RIGHT N/A 7/30/2021    Procedure: OPEN RIGHT COLECTOMY;  Surgeon: Ana Dorsey MD;  Location: [facility] OR     COMBINED CYSTOSCOPY, INSERT STENT URETER(S) Right 7/30/2021    Procedure: CYSTOSCOPY, RIGHT STENT AND URETERAL CATHETERIZATION;  Surgeon: Wyatt Mcnulty MD;  Location: [facility] OR     HC REMOVAL GALLBLADDER      Description: Cholecystectomy;  Recorded: 05/05/2008;     HC REPAIR UMBILICAL RITU,<6Y/O,REDUC      Description: Umbilical Hernia Repair;  Recorded: 05/05/2008;     IR LUMBAR EPIDURAL STEROID INJECTION  11/6/2008     LAPAROSCOPIC ASSISTED COLECTOMY N/A 7/30/2021    Procedure: LAPAROSCOPIC;  Surgeon: Ana Dorsey MD;  Location: [facility] OR     ZZC REMV KIDNEY,W/RIB RESECTION      Description: Nephrectomy;  Recorded: 09/12/2011;  Comments: RIGHT PARTIAL NEPHRECTOMY, 11/2010     Current Outpatient Medications   Medication Sig Dispense Refill     amLODIPine (NORVASC) 10 MG tablet TAKE 1 TABLET(10 MG) BY MOUTH DAILY AS DIRECTED 90 tablet 3     aspirin (ASA) 325 MG EC tablet Take 325 mg by mouth daily       atorvastatin (LIPITOR) 80 MG tablet TAKE 1 TABLET(80 MG) BY MOUTH DAILY 90 tablet 1     calcium carbonate (OS-EDER) 500 MG tablet Take 1 tablet by mouth daily       CRANBERRY EXTRACT (CRANBERRY ORAL) [CRANBERRY EXTRACT (CRANBERRY ORAL)] Take by mouth.        "cyanocobalamin (VITAMIN B-12) 100 MCG tablet Take 100 mcg by mouth daily       Dulaglutide (TRULICITY) 3 MG/0.5ML SOPN Inject 3 mg Subcutaneous once a week 6 mL 0     ferrous sulfate 325 (65 FE) MG tablet [FERROUS SULFATE 325 (65 FE) MG TABLET] Take 1 tablet (325 mg total) by mouth daily. 30 tablet 0     glipiZIDE (GLUCOTROL XL) 10 MG 24 hr tablet TAKE 1 TABLET(10 MG) BY MOUTH DAILY 90 tablet 0     lisinopril (ZESTRIL) 20 MG tablet TAKE 1 TABLET(20 MG) BY MOUTH DAILY 90 tablet 1     metFORMIN (GLUCOPHAGE) 500 MG tablet [METFORMIN (GLUCOPHAGE) 500 MG TABLET] TAKE 2 TABLETS(1000 MG) BY MOUTH TWICE DAILY WITH MEALS 360 tablet 0     MULTIVITAMIN ORAL Take 1 tablet by mouth daily        ONETOUCH VERIO IQ test strip USE AND DISCARD 1 TEST     STRIP 3 TIMES DAILY AS     DIRECTED. 300 strip 3     tamsulosin (FLOMAX) 0.4 MG capsule TAKE 1 CAPSULE(0.4 MG) BY MOUTH DAILY 90 capsule 2     Vitamin D3 (CHOLECALCIFEROL) 125 MCG (5000 UT) tablet Take 125 mcg by mouth daily         Allergies   Allergen Reactions     Morphine Nausea and Vomiting        Social History     Tobacco Use     Smoking status: Former Smoker     Smokeless tobacco: Never Used   Substance Use Topics     Alcohol use: Yes     Alcohol/week: 1.0 standard drink     Types: 1 Glasses of wine per week     No family history on file.  History   Drug Use Unknown         Objective     BP (!) 146/80   Pulse 94   Resp 19   Ht 1.753 m (5' 9\")   Wt 105.9 kg (233 lb 8 oz)   SpO2 99%   BMI 34.48 kg/m      Physical Exam    GENERAL APPEARANCE: healthy, alert and no distress     EYES: EOMI,  PERRL     HENT: ear canals and TM's normal and nose and mouth without ulcers or lesions     NECK: no adenopathy, no asymmetry, masses, or scars and thyroid normal to palpation     RESP: lungs clear to auscultation - no rales, rhonchi or wheezes     CV: regular rates and rhythm, normal S1 S2, no S3 or S4 and no murmur, click or rub     ABDOMEN:  soft, nontender, no HSM or masses and bowel " sounds normal     MS: extremities normal- no gross deformities noted, no evidence of inflammation in joints, FROM in all extremities.     SKIN: no suspicious lesions or rashes     NEURO: Normal strength and tone, sensory exam grossly normal, mentation intact and speech normal     PSYCH: mentation appears normal. and affect normal/bright     LYMPHATICS: No cervical adenopathy    Recent Labs   Lab Test 05/28/22  1734 05/25/22  1539 02/09/22  0759 08/11/21  1342 08/02/21  0712 08/01/21  0746 07/31/21  0758   HGB  --  14.6  --  9.5*  --  9.4* 8.4*   PLT  --   --   --  367  --  395 385   NA  --   --   --   --  138  --  137   POTASSIUM  --   --   --   --  3.7  --  3.7   CR 0.6*  --   --   --  0.65*  --  0.60*   A1C  --  8.7* 7.1*  --   --   --   --         Diagnostics:  Labs pending at this time.  Results will be reviewed when available.     Revised Cardiac Risk Index (RCRI):  The patient has the following serious cardiovascular risks for perioperative complications:   - No serious cardiac risks = 0 points     RCRI Interpretation: 1 point: Class II (low risk - 0.9% complication rate)           Signed Electronically by: Ricky Gonsalves CNP  Copy of this evaluation report is provided to requesting physician.

## 2022-08-11 NOTE — H&P (VIEW-ONLY)
[facility]  [address] [place] [address]  Phone: [phone]  Fax: [phone]  Primary Provider: Priti Charles  Pre-op Performing Provider: SG GONSALES    PREOPERATIVE EVALUATION:  Today's date: 8/11/2022    Regis Brooke is a 72 year old male who presents for a preoperative evaluation.    Surgical Information:  Surgery/Procedure: CYSTOSCOPY, LEFT URETEROSCOPY, LASER LITHOTRIPSY, LEFT RETROGRADE PYELOGRAM, POSSIBLE RIGHT URETEROSCOPY, POSSIBLE RIGHT URETERAL STENT PLACEMENT, POSSIBLE RIGHT RETROGRADE PYELOGRAM  Surgery Location: [facility] [place]  Surgeon: Jason Zuniga MD  Surgery Date: 8/16/2022  Time of Surgery: 7:20  Where patient plans to recover: At home with family  Fax number for surgical facility: Note does not need to be faxed, will be available electronically in Epic.    Type of Anesthesia Anticipated: to be determined    Assessment & Plan     The proposed surgical procedure is considered LOW risk.    Preoperative examination  No contraindications for planned procedure.  EKG personally interpreted as normal sinus rhythm with no ST changes    - EKG 12-lead, tracing only  - BASIC METABOLIC PANEL; Future  - Hemoglobin; Future  - Asymptomatic COVID-19 Virus (Coronavirus) by PCR; Future    Calculus of ureter  Planned cystoscopy, ureteroscopy, laser lithotripsy, possible stent placement    Type 2 diabetes mellitus without complication, without long-term current use of insulin (H)  Mildly uncontrolled with A1c 8.7 when checked 2 months ago.  On Trulicity, metformin, and glipizide.  He will hold his metformin and glipizide on the morning of his procedure    Mixed hyperlipidemia  No history of CVA or MI.  On atorvastatin and full-strength aspirin    Hypertension  He uses lisinopril and amlodipine.  Blood pressure 146/80 today.  He is going to hold his lisinopril the morning of his procedure.  Check a BMP today    Malignant neoplasm of colon,  unspecified part of colon (H)  Eating and drinking normally with normal bowel movements.    Hx of renal cell cancer  Stable    Patient Instructions   Hold all supplements, aspirin and NSAIDs for 7 days prior to surgery.    Do not take your metformin or glipizide in the morning of your procedure.    Do not take your lisinopril on the morning of your procedure.     Follow your surgeon's direction on when to stop eating and drinking prior to surgery.    Your surgeon will be managing your pain after your surgery.      Remove all jewelry and metal piercings before your surgery.     Remove nail polish from fingers before surgery.    If you use a CPAP machine, bring this with you to surgery.                 Risks and Recommendations:  The patient has the following additional risks and recommendations for perioperative complications:   - No identified additional risk factors other than previously addressed    Medication Instructions:  As above    RECOMMENDATION:  APPROVAL GIVEN to proceed with proposed procedure, without further diagnostic evaluation.      20 minutes spent on the date of the encounter doing chart review, history and exam, documentation and further activities per the note      Subjective     HPI related to upcoming procedure: As above    Preop Questions 8/11/2022   1. Have you ever had a heart attack or stroke? No   2. Have you ever had surgery on your heart or blood vessels, such as a stent placement, a coronary artery bypass, or surgery on an artery in your head, neck, heart, or legs? No   3. Do you have chest pain with activity? No   4. Do you have a history of  heart failure? No   5. Do you currently have a cold, bronchitis or symptoms of other infection? No   6. Do you have a cough, shortness of breath, or wheezing? No   7. Do you or anyone in your family have previous history of blood clots? No   8. Do you or does anyone in your family have a serious bleeding problem such as prolonged bleeding  following surgeries or cuts? No   9. Have you ever had problems with anemia or been told to take iron pills? YES - on ferrous sulfate   10. Have you had any abnormal blood loss such as black, tarry or bloody stools? No   11. Have you ever had a blood transfusion? No   12. Are you willing to have a blood transfusion if it is medically needed before, during, or after your surgery? Yes   13. Have you or any of your relatives ever had problems with anesthesia? No   14. Do you have sleep apnea, excessive snoring or daytime drowsiness? No   15. Do you have any artifical heart valves or other implanted medical devices like a pacemaker, defibrillator, or continuous glucose monitor? No   16. Do you have artificial joints? No   17. Are you allergic to latex? No       Health Care Directive:  Patient does not have a Health Care Directive or Living Will: Discussed advance care planning with patient; however, patient declined at this time.    Preoperative Review of :   reviewed - no record of controlled substances prescribed.    Status of Chronic Conditions:  See problem list for active medical problems.  Problems all longstanding and stable, except as noted/documented.  See ROS for pertinent symptoms related to these conditions.      Review of Systems  CONSTITUTIONAL: NEGATIVE for fever, chills, change in weight  INTEGUMENTARY/SKIN: NEGATIVE for worrisome rashes, moles or lesions  EYES: NEGATIVE for vision changes or irritation  ENT/MOUTH: NEGATIVE for ear, mouth and throat problems  RESP: NEGATIVE for significant cough or SOB  CV: NEGATIVE for chest pain, palpitations or peripheral edema  GI: NEGATIVE for nausea, abdominal pain, heartburn, or change in bowel habits  : NEGATIVE for frequency, dysuria, or hematuria  MUSCULOSKELETAL: NEGATIVE for significant arthralgias or myalgia  NEURO: NEGATIVE for weakness, dizziness or paresthesias  ENDOCRINE: NEGATIVE for temperature intolerance, skin/hair changes  HEME: NEGATIVE for  bleeding problems  PSYCHIATRIC: NEGATIVE for changes in mood or affect    Patient Active Problem List    Diagnosis Date Noted     RLQ abdominal mass 06/02/2022     Priority: Medium     Benign prostatic hyperplasia with lower urinary tract symptoms, symptom details unspecified 02/09/2022     Priority: Medium     Class 1 obesity due to excess calories with body mass index (BMI) of 33.0 to 33.9 in adult 02/09/2022     Priority: Medium     Bilateral impacted cerumen 08/11/2021     Priority: Medium     Colon cancer (H) 07/30/2021     Priority: Medium     Malignant neoplasm of ascending colon (H) 07/21/2021     Priority: Medium     Hypertension      Priority: Medium     Lisinopril and norvasc.  Replacement Utility updated for latest IMO load         Type 2 diabetes mellitus without complication (H)      Priority: Medium     Created by Conversion         Hyperlipidemia      Priority: Medium     Created by Conversion         Vitamin D Deficiency      Priority: Medium     Created by Conversion  Replacement Utility updated for latest IMO load         Cognitive deficits 11/28/2019     Priority: Medium     Microcytic anemia 11/28/2019     Priority: Medium     Pain in both hands 10/25/2017     Priority: Medium     Wears hearing aid - bilateral 06/20/2017     Priority: Medium     Hx of renal cell cancer      Priority: Medium     Created by Conversion  [facility] Annotation: May 29 2011  4:34PM - Negar Lawler: RIGHT KIDNEY   S/P   PARTIAL NEPHRECTOMY, 11-2-2011  Replacement Utility updated for latest IMO load         Preventative health care 05/04/2016     Priority: Medium     Nephrolithiasis Of The Left Kidney      Priority: Medium     Created by Conversion         Diverticular disease of colon 09/16/2009     Priority: Medium      Past Medical History:   Diagnosis Date     Calculus of kidney     Created by Conversion      Cognitive deficits     wife states not prominent     Essential hypertension     Lisinopril and norvasc.   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)     Created by Conversion [facility] Annotation: May 29 2011  4:34PM - Negar Lawler: RIGHT KIDNEY S/P  PARTIAL NEPHRECTOMY, 11-2-2011  Replacement Utility updated for latest IMO load     Type 2 diabetes mellitus without complication (H)     Created by Conversion      Vitamin D deficiency     Created by Conversion  Replacement Utility updated for latest IMO load     Past Surgical History:   Procedure Laterality Date     COLECTOMY RIGHT N/A 7/30/2021    Procedure: OPEN RIGHT COLECTOMY;  Surgeon: Ana Dorsey MD;  Location: [facility] [place]     COMBINED CYSTOSCOPY, INSERT STENT URETER(S) Right 7/30/2021    Procedure: CYSTOSCOPY, RIGHT STENT AND URETERAL CATHETERIZATION;  Surgeon: Wyatt Mcnulty MD;  Location: [facility] [place] OR     HC REMOVAL GALLBLADDER      Description: Cholecystectomy;  Recorded: 05/05/2008;     HC REPAIR UMBILICAL RITU,<4Y/O,REDUC      Description: Umbilical Hernia Repair;  Recorded: 05/05/2008;     IR LUMBAR EPIDURAL STEROID INJECTION  11/6/2008     LAPAROSCOPIC ASSISTED COLECTOMY N/A 7/30/2021    Procedure: LAPAROSCOPIC;  Surgeon: Ana Dorsey MD;  Location: [facility] [place] OR     ZZC REMV KIDNEY,W/RIB RESECTION      Description: Nephrectomy;  Recorded: 09/12/2011;  Comments: RIGHT PARTIAL NEPHRECTOMY, 11/2010     Current Outpatient Medications   Medication Sig Dispense Refill     amLODIPine (NORVASC) 10 MG tablet TAKE 1 TABLET(10 MG) BY MOUTH DAILY AS DIRECTED 90 tablet 3     aspirin (ASA) 325 MG EC tablet Take 325 mg by mouth daily       atorvastatin (LIPITOR) 80 MG tablet TAKE 1 TABLET(80 MG) BY MOUTH DAILY 90 tablet 1     calcium carbonate (OS-EDER) 500 MG tablet Take 1 tablet by mouth daily       CRANBERRY EXTRACT (CRANBERRY ORAL) [CRANBERRY EXTRACT (CRANBERRY ORAL)] Take by mouth.        "cyanocobalamin (VITAMIN B-12) 100 MCG tablet Take 100 mcg by mouth daily       Dulaglutide (TRULICITY) 3 MG/0.5ML SOPN Inject 3 mg Subcutaneous once a week 6 mL 0     ferrous sulfate 325 (65 FE) MG tablet [FERROUS SULFATE 325 (65 FE) MG TABLET] Take 1 tablet (325 mg total) by mouth daily. 30 tablet 0     glipiZIDE (GLUCOTROL XL) 10 MG 24 hr tablet TAKE 1 TABLET(10 MG) BY MOUTH DAILY 90 tablet 0     lisinopril (ZESTRIL) 20 MG tablet TAKE 1 TABLET(20 MG) BY MOUTH DAILY 90 tablet 1     metFORMIN (GLUCOPHAGE) 500 MG tablet [METFORMIN (GLUCOPHAGE) 500 MG TABLET] TAKE 2 TABLETS(1000 MG) BY MOUTH TWICE DAILY WITH MEALS 360 tablet 0     MULTIVITAMIN ORAL Take 1 tablet by mouth daily        ONETOUCH VERIO IQ test strip USE AND DISCARD 1 TEST     STRIP 3 TIMES DAILY AS     DIRECTED. 300 strip 3     tamsulosin (FLOMAX) 0.4 MG capsule TAKE 1 CAPSULE(0.4 MG) BY MOUTH DAILY 90 capsule 2     Vitamin D3 (CHOLECALCIFEROL) 125 MCG (5000 UT) tablet Take 125 mcg by mouth daily         Allergies   Allergen Reactions     Morphine Nausea and Vomiting        Social History     Tobacco Use     Smoking status: Former Smoker     Smokeless tobacco: Never Used   Substance Use Topics     Alcohol use: Yes     Alcohol/week: 1.0 standard drink     Types: 1 Glasses of wine per week     No family history on file.  History   Drug Use Unknown         Objective     BP (!) 146/80   Pulse 94   Resp 19   Ht 1.753 m (5' 9\")   Wt 105.9 kg (233 lb 8 oz)   SpO2 99%   BMI 34.48 kg/m      Physical Exam    GENERAL APPEARANCE: healthy, alert and no distress     EYES: EOMI,  PERRL     HENT: ear canals and TM's normal and nose and mouth without ulcers or lesions     NECK: no adenopathy, no asymmetry, masses, or scars and thyroid normal to palpation     RESP: lungs clear to auscultation - no rales, rhonchi or wheezes     CV: regular rates and rhythm, normal S1 S2, no S3 or S4 and no murmur, click or rub     ABDOMEN:  soft, nontender, no HSM or masses and bowel " sounds normal     MS: extremities normal- no gross deformities noted, no evidence of inflammation in joints, FROM in all extremities.     SKIN: no suspicious lesions or rashes     NEURO: Normal strength and tone, sensory exam grossly normal, mentation intact and speech normal     PSYCH: mentation appears normal. and affect normal/bright     LYMPHATICS: No cervical adenopathy    Recent Labs   Lab Test 05/28/22  1734 05/25/22  1539 02/09/22  0759 08/11/21  1342 08/02/21  0712 08/01/21  0746 07/31/21  0758   HGB  --  14.6  --  9.5*  --  9.4* 8.4*   PLT  --   --   --  367  --  395 385   NA  --   --   --   --  138  --  137   POTASSIUM  --   --   --   --  3.7  --  3.7   CR 0.6*  --   --   --  0.65*  --  0.60*   A1C  --  8.7* 7.1*  --   --   --   --         Diagnostics:  Labs pending at this time.  Results will be reviewed when available.     Revised Cardiac Risk Index (RCRI):  The patient has the following serious cardiovascular risks for perioperative complications:   - No serious cardiac risks = 0 points     RCRI Interpretation: 1 point: Class II (low risk - 0.9% complication rate)           Signed Electronically by: Ricky Gonsalves CNP  Copy of this evaluation report is provided to requesting physician.

## 2022-08-12 ENCOUNTER — LAB (OUTPATIENT)
Dept: LAB | Facility: CLINIC | Age: 72
End: 2022-08-12
Attending: NURSE PRACTITIONER
Payer: COMMERCIAL

## 2022-08-12 DIAGNOSIS — Z01.818 PREOPERATIVE EXAMINATION: ICD-10-CM

## 2022-08-12 LAB — SARS-COV-2 RNA RESP QL NAA+PROBE: NEGATIVE

## 2022-08-12 PROCEDURE — U0003 INFECTIOUS AGENT DETECTION BY NUCLEIC ACID (DNA OR RNA); SEVERE ACUTE RESPIRATORY SYNDROME CORONAVIRUS 2 (SARS-COV-2) (CORONAVIRUS DISEASE [COVID-19]), AMPLIFIED PROBE TECHNIQUE, MAKING USE OF HIGH THROUGHPUT TECHNOLOGIES AS DESCRIBED BY CMS-2020-01-R: HCPCS

## 2022-08-12 PROCEDURE — U0005 INFEC AGEN DETEC AMPLI PROBE: HCPCS

## 2022-08-15 ENCOUNTER — ANESTHESIA EVENT (OUTPATIENT)
Dept: SURGERY | Facility: HOSPITAL | Age: 72
End: 2022-08-15
Payer: COMMERCIAL

## 2022-08-15 LAB
ATRIAL RATE - MUSE: 85 BPM
DIASTOLIC BLOOD PRESSURE - MUSE: NORMAL MMHG
INTERPRETATION ECG - MUSE: NORMAL
P AXIS - MUSE: 30 DEGREES
PR INTERVAL - MUSE: 174 MS
QRS DURATION - MUSE: 98 MS
QT - MUSE: 398 MS
QTC - MUSE: 473 MS
R AXIS - MUSE: -30 DEGREES
SYSTOLIC BLOOD PRESSURE - MUSE: NORMAL MMHG
T AXIS - MUSE: 20 DEGREES
VENTRICULAR RATE- MUSE: 85 BPM

## 2022-08-15 ASSESSMENT — ENCOUNTER SYMPTOMS: DYSRHYTHMIAS: 0

## 2022-08-15 NOTE — ANESTHESIA PREPROCEDURE EVALUATION
Anesthesia Pre-Procedure Evaluation    Patient: Regis Brooke   MRN: [id number] : 1950        Preoperative Diagnosis: Malignant neoplasm of ascending colon (H) [C18.2]   Procedure : Procedure(s):  LAPAROSCOPIC  POSSIBLE OPEN RIGHT COLECTOMY  CYSTOSCOPY, BILATERAL STENT AND URETERAL CATHETERIZATION     Past Medical History:   Diagnosis Date     Anemia      Benign prostatic hyperplasia with lower urinary tract symptoms, symptom details unspecified      Calculus of kidney     Created by Conversion      Cognitive deficits     wife states not prominent     Diverticular disease of colon      Essential hypertension     Lisinopril and norvasc.  Replacement Utility updated for latest IMO load     [place] (hard of hearing)      Hyperlipidemia     Created by Conversion      Lumbago     Created by Conversion      Malignant neoplasm of colon (H)      Malignant neoplasm of kidney excluding renal pelvis (H)     Created by Conversion [facility] Annotation: May 29 2011  4:34PM - Negar Lawler: RIGHT KIDNEY S/P  PARTIAL NEPHRECTOMY, 2011  Replacement Utility updated for latest IMO load     Obesity      Type 2 diabetes mellitus without complication (H)     Created by Conversion      Vitamin D deficiency     Created by Conversion  Replacement Utility updated for latest IMO load      Past Surgical History:   Procedure Laterality Date     COLECTOMY RIGHT N/A 2021    Procedure: OPEN RIGHT COLECTOMY;  Surgeon: Ana Dorsey MD;  [place] [facility] [place]     COMBINED CYSTOSCOPY, INSERT STENT URETER(S) Right 2021    Procedure: CYSTOSCOPY, RIGHT STENT AND URETERAL CATHETERIZATION;  Surgeon: Wyatt Mcnulty MD;  [place] [facility] [place]     HC REMOVAL GALLBLADDER      Description: Cholecystectomy;  Recorded: 2008;     HC REPAIR UMBILICAL RITU,<6Y/O,REDUC      Description: Umbilical Hernia Repair;  Recorded: 2008;     IR LUMBAR EPIDURAL STEROID INJECTION  2008     LAPAROSCOPIC ASSISTED COLECTOMY  N/A 7/30/2021    Procedure: LAPAROSCOPIC;  Surgeon: Ana Dorsey MD;  Location: [facility] OR     [facility] REMV KIDNEY,W/RIB RESECTION      Description: Nephrectomy;  Recorded: 09/12/2011;  Comments: RIGHT PARTIAL NEPHRECTOMY, 11/2010      Allergies   Allergen Reactions     Morphine Nausea and Vomiting      Social History     Tobacco Use     Smoking status: Former Smoker     Smokeless tobacco: Never Used   Substance Use Topics     Alcohol use: Yes     Alcohol/week: 1.0 standard drink     Types: 1 Glasses of wine per week      Wt Readings from Last 1 Encounters:   08/11/22 105.9 kg (233 lb 8 oz)        Anesthesia Evaluation   Pt has had prior anesthetic. Type: General.    No history of anesthetic complications       ROS/MED HX  ENT/Pulmonary:  - neg pulmonary ROS  (-) sleep apnea   Neurologic: Comment: [place] - neg neurologic ROS  (-) no CVA   Cardiovascular:     (+) Dyslipidemia hypertension----- (-) arrhythmias and valvular problems/murmurs   METS/Exercise Tolerance:     Hematologic:     (+) anemia (microcytic, related to colon CA - s/p 2 unit prbc transfusion per pcp for hgb 7.6 preop),     Musculoskeletal:  - neg musculoskeletal ROS  (-) arthritis   GI/Hepatic: Comment: Mass of ascending colon removed  diverticulitis    (+) bowel prep,  (-) GERD and hiatal hernia   Renal/Genitourinary:     (+) renal disease (hx RCC s/p partial R nephrectomy),     Endo:     (+) type II DM (A1c=8.7), Last HgA1c: 6, Not using insulin, Obesity,  (-) Type I DM   Psychiatric/Substance Use:  - neg psychiatric ROS  (-) psychiatric history and chronic opioid use history   Infectious Disease:  - neg infectious disease ROS     Malignancy: Comment: Colon  Renal  (+) Malignancy, History of GI.GI CA  Active status post Surgery.        Other:  - neg other ROS    (+) , no H/O Chronic Pain,        Physical Exam    Airway        Mallampati: II   TM distance: > 3 FB   Neck ROM: full   Mouth opening: > 3 cm    Respiratory Devices and Support          Dental     Comment: Upper bridge        Cardiovascular   cardiovascular exam normal          Pulmonary   pulmonary exam normal            Other findings: Hearing aids in place    OUTSIDE LABS:  CBC:   Lab Results   Component Value Date    WBC 9.3 08/11/2021    WBC 8.8 08/01/2021    HGB 14.9 08/11/2022    HGB 14.6 05/25/2022    HCT 31.2 (L) 08/11/2021    HCT 32.3 (L) 08/01/2021     08/11/2021     08/01/2021     BMP:   Lab Results   Component Value Date     08/11/2022     08/02/2021    POTASSIUM 4.1 08/11/2022    POTASSIUM 3.7 08/02/2021    CHLORIDE 101 08/11/2022    CHLORIDE 105 08/02/2021    CO2 23 08/11/2022    CO2 24 08/02/2021    BUN 10.1 08/11/2022    BUN 6 (L) 08/02/2021    CR 0.61 (L) 08/11/2022    CR 0.6 (L) 05/28/2022     (H) 08/11/2022     08/02/2021     COAGS: No results found for: PTT, INR, FIBR  POC: No results found for: BGM, HCG, HCGS  HEPATIC:   Lab Results   Component Value Date    ALBUMIN 3.2 (L) 07/21/2021    PROTTOTAL 6.5 07/21/2021    ALT <9 07/21/2021    AST 11 07/21/2021    ALKPHOS 72 07/21/2021    BILITOTAL 0.3 07/21/2021     OTHER:   Lab Results   Component Value Date    A1C 8.7 (H) 05/25/2022    EDER 9.7 08/11/2022    MAG 2.0 07/31/2021    LIPASE 31 03/07/2020    TSH 1.81 07/17/2018       Anesthesia Plan    ASA Status:  3      Anesthesia Type: General.     - Airway: LMA   Induction: Intravenous, Propofol.   Maintenance: Inhalation.        Consents    Anesthesia Plan(s) and associated risks, benefits, and realistic alternatives discussed. Questions answered and patient/representative(s) expressed understanding.    - Discussed:     - Discussed with:  Patient      - Extended Intubation/Ventilatory Support Discussed: No.      - Patient is DNR/DNI Status: No    Use of blood products discussed: No .     Postoperative Care    Pain management: IV analgesics, Multi-modal analgesia, Peripheral nerve block (Single Shot).   PONV prophylaxis: Ondansetron (or  other 5HT-3), Dexamethasone or Solumedrol     Comments:    Other Comments: GALMA  ETT if surgeon requests  Pre op tylenol  toradol at the end of the case if okay with the surgeon  Antiemetics (4 of decadron, 4 zofran)                Jason Flores MD

## 2022-08-16 ENCOUNTER — APPOINTMENT (OUTPATIENT)
Dept: RADIOLOGY | Facility: HOSPITAL | Age: 72
End: 2022-08-16
Attending: UROLOGY
Payer: COMMERCIAL

## 2022-08-16 ENCOUNTER — HOSPITAL ENCOUNTER (OUTPATIENT)
Facility: HOSPITAL | Age: 72
Discharge: HOME | End: 2022-08-16
Attending: UROLOGY | Admitting: UROLOGY
Payer: COMMERCIAL

## 2022-08-16 ENCOUNTER — ANESTHESIA (OUTPATIENT)
Dept: SURGERY | Facility: HOSPITAL | Age: 72
End: 2022-08-16
Payer: COMMERCIAL

## 2022-08-16 VITALS
WEIGHT: 231.1 LBS | RESPIRATION RATE: 20 BRPM | HEART RATE: 90 BPM | TEMPERATURE: 97.8 F | SYSTOLIC BLOOD PRESSURE: 134 MMHG | BODY MASS INDEX: 34.13 KG/M2 | OXYGEN SATURATION: 97 % | DIASTOLIC BLOOD PRESSURE: 72 MMHG

## 2022-08-16 DIAGNOSIS — N20.2 CALCULUS OF KIDNEY AND URETER: Primary | ICD-10-CM

## 2022-08-16 LAB
GLUCOSE BLDC GLUCOMTR-MCNC: 215 MG/DL (ref 70–99)
GLUCOSE BLDC GLUCOMTR-MCNC: 240 MG/DL (ref 70–99)
GLUCOSE BLDC GLUCOMTR-MCNC: 290 MG/DL (ref 70–99)

## 2022-08-16 PROCEDURE — 360N000082 HC SURGERY LEVEL 2 W/ FLUORO, PER MIN: Performed by: UROLOGY

## 2022-08-16 PROCEDURE — C2617 STENT, NON-COR, TEM W/O DEL: HCPCS | Performed by: UROLOGY

## 2022-08-16 PROCEDURE — 82962 GLUCOSE BLOOD TEST: CPT

## 2022-08-16 PROCEDURE — 255N000002 HC RX 255 OP 636: Performed by: UROLOGY

## 2022-08-16 PROCEDURE — 250N000012 HC RX MED GY IP 250 OP 636 PS 637: Performed by: ANESTHESIOLOGY

## 2022-08-16 PROCEDURE — 710N000012 HC RECOVERY PHASE 2, PER MINUTE: Performed by: UROLOGY

## 2022-08-16 PROCEDURE — 258N000003 HC RX IP 258 OP 636: Performed by: ANESTHESIOLOGY

## 2022-08-16 PROCEDURE — 250N000013 HC RX MED GY IP 250 OP 250 PS 637: Performed by: UROLOGY

## 2022-08-16 PROCEDURE — 272N000001 HC OR GENERAL SUPPLY STERILE: Performed by: UROLOGY

## 2022-08-16 PROCEDURE — 250N000011 HC RX IP 250 OP 636: Performed by: NURSE PRACTITIONER

## 2022-08-16 PROCEDURE — 370N000017 HC ANESTHESIA TECHNICAL FEE, PER MIN: Performed by: UROLOGY

## 2022-08-16 PROCEDURE — 250N000013 HC RX MED GY IP 250 OP 250 PS 637: Performed by: ANESTHESIOLOGY

## 2022-08-16 PROCEDURE — C1769 GUIDE WIRE: HCPCS | Performed by: UROLOGY

## 2022-08-16 PROCEDURE — 250N000011 HC RX IP 250 OP 636: Performed by: NURSE ANESTHETIST, CERTIFIED REGISTERED

## 2022-08-16 PROCEDURE — C1894 INTRO/SHEATH, NON-LASER: HCPCS | Performed by: UROLOGY

## 2022-08-16 PROCEDURE — 999N000180 XR SURGERY CARM FLUORO LESS THAN 5 MIN

## 2022-08-16 PROCEDURE — 710N000009 HC RECOVERY PHASE 1, LEVEL 1, PER MIN: Performed by: UROLOGY

## 2022-08-16 PROCEDURE — 999N000141 HC STATISTIC PRE-PROCEDURE NURSING ASSESSMENT: Performed by: UROLOGY

## 2022-08-16 PROCEDURE — 250N000009 HC RX 250: Performed by: NURSE ANESTHETIST, CERTIFIED REGISTERED

## 2022-08-16 PROCEDURE — 250N000025 HC SEVOFLURANE, PER MIN: Performed by: UROLOGY

## 2022-08-16 PROCEDURE — 258N000003 HC RX IP 258 OP 636: Performed by: NURSE ANESTHETIST, CERTIFIED REGISTERED

## 2022-08-16 PROCEDURE — 96372 THER/PROPH/DIAG INJ SC/IM: CPT | Performed by: ANESTHESIOLOGY

## 2022-08-16 PROCEDURE — 258N000001 HC RX 258: Performed by: UROLOGY

## 2022-08-16 PROCEDURE — 250N000009 HC RX 250: Performed by: UROLOGY

## 2022-08-16 PROCEDURE — 82365 CALCULUS SPECTROSCOPY: CPT | Performed by: UROLOGY

## 2022-08-16 DEVICE — URETERAL STENT
Type: IMPLANTABLE DEVICE | Site: URETER | Status: NON-FUNCTIONAL
Brand: PERCUFLEX™ PLUS
Removed: 2022-08-30

## 2022-08-16 RX ORDER — NALOXONE HYDROCHLORIDE 1 MG/ML
0.2 INJECTION INTRAMUSCULAR; INTRAVENOUS; SUBCUTANEOUS
Status: DISCONTINUED | OUTPATIENT
Start: 2022-08-16 | End: 2022-08-16 | Stop reason: HOSPADM

## 2022-08-16 RX ORDER — PROPOFOL 10 MG/ML
INJECTION, EMULSION INTRAVENOUS PRN
Status: DISCONTINUED | OUTPATIENT
Start: 2022-08-16 | End: 2022-08-16

## 2022-08-16 RX ORDER — SODIUM CHLORIDE, SODIUM LACTATE, POTASSIUM CHLORIDE, CALCIUM CHLORIDE 600; 310; 30; 20 MG/100ML; MG/100ML; MG/100ML; MG/100ML
INJECTION, SOLUTION INTRAVENOUS CONTINUOUS
Status: DISCONTINUED | OUTPATIENT
Start: 2022-08-16 | End: 2022-08-16 | Stop reason: HOSPADM

## 2022-08-16 RX ORDER — ACETAMINOPHEN 325 MG/1
975 TABLET ORAL ONCE
Status: DISCONTINUED | OUTPATIENT
Start: 2022-08-16 | End: 2022-08-16 | Stop reason: HOSPADM

## 2022-08-16 RX ORDER — OXYCODONE HYDROCHLORIDE 5 MG/1
5 TABLET ORAL EVERY 4 HOURS PRN
Status: DISCONTINUED | OUTPATIENT
Start: 2022-08-16 | End: 2022-08-16 | Stop reason: HOSPADM

## 2022-08-16 RX ORDER — HYDROMORPHONE HYDROCHLORIDE 1 MG/ML
0.2 INJECTION, SOLUTION INTRAMUSCULAR; INTRAVENOUS; SUBCUTANEOUS EVERY 5 MIN PRN
Status: DISCONTINUED | OUTPATIENT
Start: 2022-08-16 | End: 2022-08-16 | Stop reason: HOSPADM

## 2022-08-16 RX ORDER — HYDROCODONE BITARTRATE AND ACETAMINOPHEN 5; 325 MG/1; MG/1
1 TABLET ORAL EVERY 4 HOURS PRN
Status: DISCONTINUED | OUTPATIENT
Start: 2022-08-16 | End: 2022-08-16 | Stop reason: HOSPADM

## 2022-08-16 RX ORDER — FENTANYL CITRATE 50 UG/ML
INJECTION, SOLUTION INTRAMUSCULAR; INTRAVENOUS PRN
Status: DISCONTINUED | OUTPATIENT
Start: 2022-08-16 | End: 2022-08-16

## 2022-08-16 RX ORDER — NALOXONE HYDROCHLORIDE 1 MG/ML
0.4 INJECTION INTRAMUSCULAR; INTRAVENOUS; SUBCUTANEOUS
Status: DISCONTINUED | OUTPATIENT
Start: 2022-08-16 | End: 2022-08-16 | Stop reason: HOSPADM

## 2022-08-16 RX ORDER — DEXTROSE MONOHYDRATE 25 G/50ML
25-50 INJECTION, SOLUTION INTRAVENOUS
Status: DISCONTINUED | OUTPATIENT
Start: 2022-08-16 | End: 2022-08-16 | Stop reason: HOSPADM

## 2022-08-16 RX ORDER — LIDOCAINE HYDROCHLORIDE 10 MG/ML
INJECTION, SOLUTION INFILTRATION; PERINEURAL PRN
Status: DISCONTINUED | OUTPATIENT
Start: 2022-08-16 | End: 2022-08-16

## 2022-08-16 RX ORDER — TOLTERODINE TARTRATE 1 MG/1
2 TABLET, EXTENDED RELEASE ORAL
Status: COMPLETED | OUTPATIENT
Start: 2022-08-16 | End: 2022-08-16

## 2022-08-16 RX ORDER — ONDANSETRON 4 MG/1
4 TABLET, ORALLY DISINTEGRATING ORAL EVERY 30 MIN PRN
Status: DISCONTINUED | OUTPATIENT
Start: 2022-08-16 | End: 2022-08-16 | Stop reason: HOSPADM

## 2022-08-16 RX ORDER — ONDANSETRON 2 MG/ML
INJECTION INTRAMUSCULAR; INTRAVENOUS PRN
Status: DISCONTINUED | OUTPATIENT
Start: 2022-08-16 | End: 2022-08-16

## 2022-08-16 RX ORDER — MEPERIDINE HYDROCHLORIDE 25 MG/ML
12.5 INJECTION INTRAMUSCULAR; INTRAVENOUS; SUBCUTANEOUS
Status: DISCONTINUED | OUTPATIENT
Start: 2022-08-16 | End: 2022-08-16 | Stop reason: HOSPADM

## 2022-08-16 RX ORDER — LIDOCAINE 40 MG/G
CREAM TOPICAL
Status: DISCONTINUED | OUTPATIENT
Start: 2022-08-16 | End: 2022-08-16 | Stop reason: HOSPADM

## 2022-08-16 RX ORDER — HYDROCODONE BITARTRATE AND ACETAMINOPHEN 5; 325 MG/1; MG/1
1-2 TABLET ORAL EVERY 4 HOURS PRN
Qty: 10 TABLET | Refills: 0 | Status: SHIPPED | OUTPATIENT
Start: 2022-08-16 | End: 2023-03-06

## 2022-08-16 RX ORDER — NICOTINE POLACRILEX 4 MG
15-30 LOZENGE BUCCAL
Status: DISCONTINUED | OUTPATIENT
Start: 2022-08-16 | End: 2022-08-16 | Stop reason: HOSPADM

## 2022-08-16 RX ORDER — ACETAMINOPHEN 325 MG/1
650 TABLET ORAL EVERY 4 HOURS PRN
Status: DISCONTINUED | OUTPATIENT
Start: 2022-08-16 | End: 2022-08-16 | Stop reason: HOSPADM

## 2022-08-16 RX ORDER — FENTANYL CITRATE 50 UG/ML
25 INJECTION, SOLUTION INTRAMUSCULAR; INTRAVENOUS
Status: DISCONTINUED | OUTPATIENT
Start: 2022-08-16 | End: 2022-08-16 | Stop reason: HOSPADM

## 2022-08-16 RX ORDER — ATROPA BELLADONNA AND OPIUM 16.2; 3 MG/1; MG/1
1 SUPPOSITORY RECTAL EVERY 8 HOURS PRN
Status: DISCONTINUED | OUTPATIENT
Start: 2022-08-16 | End: 2022-08-16 | Stop reason: HOSPADM

## 2022-08-16 RX ORDER — CEFAZOLIN SODIUM/WATER 2 G/20 ML
2 SYRINGE (ML) INTRAVENOUS SEE ADMIN INSTRUCTIONS
Status: DISCONTINUED | OUTPATIENT
Start: 2022-08-16 | End: 2022-08-16 | Stop reason: HOSPADM

## 2022-08-16 RX ORDER — ACETAMINOPHEN 325 MG/1
975 TABLET ORAL ONCE
Status: COMPLETED | OUTPATIENT
Start: 2022-08-16 | End: 2022-08-16

## 2022-08-16 RX ORDER — FENTANYL CITRATE 50 UG/ML
25 INJECTION, SOLUTION INTRAMUSCULAR; INTRAVENOUS EVERY 5 MIN PRN
Status: DISCONTINUED | OUTPATIENT
Start: 2022-08-16 | End: 2022-08-16 | Stop reason: HOSPADM

## 2022-08-16 RX ORDER — ONDANSETRON 2 MG/ML
4 INJECTION INTRAMUSCULAR; INTRAVENOUS EVERY 30 MIN PRN
Status: DISCONTINUED | OUTPATIENT
Start: 2022-08-16 | End: 2022-08-16 | Stop reason: HOSPADM

## 2022-08-16 RX ORDER — CEFAZOLIN SODIUM/WATER 2 G/20 ML
2 SYRINGE (ML) INTRAVENOUS
Status: COMPLETED | OUTPATIENT
Start: 2022-08-16 | End: 2022-08-16

## 2022-08-16 RX ORDER — DEXAMETHASONE SODIUM PHOSPHATE 4 MG/ML
INJECTION, SOLUTION INTRA-ARTICULAR; INTRALESIONAL; INTRAMUSCULAR; INTRAVENOUS; SOFT TISSUE PRN
Status: DISCONTINUED | OUTPATIENT
Start: 2022-08-16 | End: 2022-08-16

## 2022-08-16 RX ADMIN — ACETAMINOPHEN 975 MG: 325 TABLET ORAL at 05:49

## 2022-08-16 RX ADMIN — FENTANYL CITRATE 100 MCG: 50 INJECTION, SOLUTION INTRAMUSCULAR; INTRAVENOUS at 07:19

## 2022-08-16 RX ADMIN — HUMAN INSULIN 2 UNITS: 100 INJECTION, SOLUTION SUBCUTANEOUS at 12:18

## 2022-08-16 RX ADMIN — PROPOFOL 150 MG: 10 INJECTION, EMULSION INTRAVENOUS at 07:33

## 2022-08-16 RX ADMIN — SODIUM CHLORIDE, POTASSIUM CHLORIDE, SODIUM LACTATE AND CALCIUM CHLORIDE: 600; 310; 30; 20 INJECTION, SOLUTION INTRAVENOUS at 06:03

## 2022-08-16 RX ADMIN — Medication 2 G: at 07:13

## 2022-08-16 RX ADMIN — SODIUM CHLORIDE, POTASSIUM CHLORIDE, SODIUM LACTATE AND CALCIUM CHLORIDE: 600; 310; 30; 20 INJECTION, SOLUTION INTRAVENOUS at 08:02

## 2022-08-16 RX ADMIN — TOLTERODINE TARTRATE 2 MG: 1 TABLET, FILM COATED ORAL at 12:14

## 2022-08-16 RX ADMIN — ONDANSETRON 4 MG: 2 INJECTION INTRAMUSCULAR; INTRAVENOUS at 07:33

## 2022-08-16 RX ADMIN — PHENYLEPHRINE HYDROCHLORIDE 50 MCG: 10 INJECTION INTRAVENOUS at 09:08

## 2022-08-16 RX ADMIN — LIDOCAINE HYDROCHLORIDE 30 MG: 10 INJECTION, SOLUTION INFILTRATION; PERINEURAL at 07:33

## 2022-08-16 RX ADMIN — DEXAMETHASONE SODIUM PHOSPHATE 4 MG: 4 INJECTION, SOLUTION INTRA-ARTICULAR; INTRALESIONAL; INTRAMUSCULAR; INTRAVENOUS; SOFT TISSUE at 07:33

## 2022-08-16 RX ADMIN — ROCURONIUM BROMIDE 40 MG: 50 INJECTION, SOLUTION INTRAVENOUS at 07:33

## 2022-08-16 RX ADMIN — PROPOFOL 10 MG: 10 INJECTION, EMULSION INTRAVENOUS at 07:28

## 2022-08-16 RX ADMIN — ROCURONIUM BROMIDE 10 MG: 50 INJECTION, SOLUTION INTRAVENOUS at 08:55

## 2022-08-16 ASSESSMENT — ACTIVITIES OF DAILY LIVING (ADL)
ADLS_ACUITY_SCORE: 35

## 2022-08-16 NOTE — ANESTHESIA PROCEDURE NOTES
Airway       Patient location during procedure: OR       Procedure Start/Stop Times: 8/16/2022 7:35 AM  Staff -        Anesthesiologist:  Jason Flores MD       CRNA: Bright Malin APRN CRNA       Performed By: CRNAIndications and Patient Condition       Indications for airway management: jose carlos-procedural       Induction type:intravenous       Mask difficulty assessment: 1 - vent by mask    Final Airway Details       Final airway type: endotracheal airway       Successful airway: ETT - single  Endotracheal Airway Details        ETT size (mm): 7.5       Cuffed: yes       Successful intubation technique: direct laryngoscopy       DL Blade Type: [place] 2       Grade View of Cords: 2       Adjucts: stylet and tooth guard       Position: Right       Measured from: lips       Secured at (cm): 23       Bite block used: None    Post intubation assessment        Placement verified by: capnometry, equal breath sounds and chest rise        Number of attempts at approach: 1       Number of other approaches attempted: 0       Secured with: silk tape       Ease of procedure: easy       Dentition: Intact and Unchanged    Medication(s) Administered   Medication Administration Time: 8/16/2022 7:35 AM

## 2022-08-16 NOTE — OR NURSING
BS is 240 in PACU.  No orders given to treat per Dr Flores.  Check BS again in one hour and report back to Dr Flores

## 2022-08-16 NOTE — INTERVAL H&P NOTE
"I have reviewed the surgical (or preoperative) H&P that is linked to this encounter, and examined the patient. There are no significant changes    Clinical Conditions Present on Arrival:  Clinically Significant Risk Factors Present on Admission                   # DMII: A1C = N/A within past 3 months  # Obesity: Estimated body mass index is 34.13 kg/m  as calculated from the following:    Height as of 8/11/22: 1.753 m (5' 9\").    Weight as of this encounter: 104.8 kg (231 lb 1.6 oz).       "

## 2022-08-16 NOTE — ANESTHESIA POSTPROCEDURE EVALUATION
Patient: Regis Brooke    Procedure: Procedure(s):  CYSTOSCOPY, LEFT URETEROSCOPY, LASER LITHOTRIPSY ON LEFT,  LEFT RETROGRADE PYELOGRAM, LEFT URETERAL STENT PLACEMENT, RIGHT URETEROSCOPY, RIGHT RETROGRADE PYELOGRAM,  RIGHT URETERAL STENT PLACEMENT       Anesthesia Type:  General    Note:  Disposition: Outpatient   Postop Pain Control: Uneventful            Sign Out: Well controlled pain   PONV: No   Neuro/Psych: Uneventful            Sign Out: Acceptable/Baseline neuro status   Airway/Respiratory: Uneventful            Sign Out: Acceptable/Baseline resp. status   CV/Hemodynamics: Uneventful            Sign Out: Acceptable CV status; No obvious hypovolemia; No obvious fluid overload   Other NRE: NONE   DID A NON-ROUTINE EVENT OCCUR? No           Last vitals:  Vitals Value Taken Time   /74 08/16/22 1100   Temp 36.6  C (97.8  F) 08/16/22 1045   Pulse 80 08/16/22 1101   Resp 12 08/16/22 1101   SpO2 95 % 08/16/22 1101   Vitals shown include unvalidated device data.    Electronically Signed By: Jason Flores MD  August 16, 2022  2:19 PM

## 2022-08-16 NOTE — OP NOTE
Location: [facility]     Patient Name: Regis Brooke  Patient : 1950  Patient MRN: [id number]  Patient CSN: [id number]  Patient PCP: Priti Charles    Date of Service: 22     Pre-operative diagnosis: Bilateral ureteral stones, left renal stone    Post-operative diagnosis: Bilateral ureteral stones, left renal stone    Procedure:  Cystoscopy, Bilateral retrograde pyelogram, Rigid Left ureteroscopy with laser lithotripsy for left distal ureteral stone, flexible left ureteroscopy with laser lithotripsy of left renal stone, Left 6 Fr x 28 cm ureteral stent placement, right ureteroscopy, right 4.8 Fr x 28 cm ureteral stent placement    Surgeon: Dr. Jason Zuniga    EBL: 10 mL    Anesthesia: General    Indication: 72 year old male who was found to have a 4 mm right UVJ vs bladder stone as well as a 7 mm left UVJ stone and 8 x 5 mm left lower pole stone.  Treatment of the stones via ureteroscopy was recommended.  Risks, benefits, and alternatives to treatment were discussed with the patient and the patient elected to proceed.    Findings: His urethra was normal. He had a trilobar prostatic hyperplasia with a large ball-valve median lobe.  He had mild bladder trabeculation.  He had normal bilateral ureteral orifices. There was not hydronephrosis of either kidney on retrograde pyelograms (left sided had some air bubbles injected).  Right ureteroscopy was only able to performed in the distal ureter as it was too narrow to go further. I was not able to tell on imaging nor direct vision if there was a right distal ureteral stone.  Placement of a right ureteral stent was very difficult and required the use of a 4.8 Fr stent.  On the left side, the distal ureteral and lower pole stones were seen on  imaging.  A left ureteral stent was in good position.    Overall, this procedure was had moderate increase in difficulty due to the patient's anatomy and took 50% longer than typical due to the anatomy as  well.  His ball valve median lobe on his prostate made it quite difficult to get access into the ureters as well as remove stones out of the bladder.  The left lower pole stone was in a difficult lower pole calyx and took time to move.    Specimens: Left ureteral and renal stones    Procedure:  After written informed consent was obtained the patient was brought into the operating room.  The patient was properly identified prior to the procedure, received preprocedure antibiotics, and had sequential compression devices on the legs.  The patient was then induced under anesthesia.    The patient was placed in dorsal lithotomy position and prepped and draped in the usual sterile fashion.  Cystoscopy was performed with the above findings.    A Right retrograde pyelogram was performed with the above findings.    A straight sensor wire was placed into the Right kidney. The 8/10 Fr coaxial dilators were then used to try to dilate the right ureteral orifice but the 8 Fr dilator was snug. Right rigid ureteroscopy was performed into the distal ureter but I could not go any further.  I elected to place an ureteral stent as I could not determine if there was a stone present or not.  The straight sensor wire was exchanged for the Super Stiff wire.   I attempted placement of a 6 Fr stent but it was extremely tight.  I ended up placing a 4.8 Fr x 28 cm ureteral stent.  There was a good coil in the kidney and a good coil in the bladder.  I verified the distal coil via cystoscopy.    A Left retrograde pyelogram was performed with the above findings.    A straight sensor wire was placed into the Left kidney.  Left rigid ureteroscopy was performed.  The stone was fragmented using the 200 nm laser fiber and holmium laser.  The fragments were removed via the [place] Halo basket and dropped off into the bladder.  All stones >1 mm were removed.      The 8/10 Fr coaxial dilators were then used to place a Super Stiff wire into the Left  kidney.  Over the Super Stiff wire and under fluoroscopic guidance, a 11/13 Fr x 36 cm [employer] ureteral access sheath was placed. This was sutured in place using 2-0 Silk.  Flexible ureteroscopy was performed as above. The lower pole stone was moved to the upper pole.  The stone was broken into small fragments using the Holmium laser and 200 nm laser fiber. The [place] Halo basket was used to remove stone fragments.  All pieces larger than the diameter of the basket were removed. There was only a handful of <1 mm stones remaining.  The access sheath was removed under direct vision.  The straight sensor wire was exchanged for the Super Stiff wire. A 6 Fr x 28 cm ureteral stent was then deployed over the Super Stiff wire. There was a good coil in the kidney and a good coil in the bladder.  I verified the distal coil via cystoscopy.     Using the bladder evacuator, the fragments of the left ureteral stone were removed. However, due to the median lobe, at least 5-6 stones needed to be removed under direct vision/manually.  I do not believe there are any large fragments remaining in his bladder.    Due to bleeding from the median lobe, a 20 Fr carey catheter with 10 mL of water in the balloon was placed.    At this point, the procedure was completed.  He tolerated the procedure well.    Plan: Home with catheter. Remove catheter at home on 8/17/22.  Will need to arrange for right ureteroscopy to evaluate his right ureter.    Jason Zuniga MD  9:51 AM

## 2022-08-19 LAB
APPEARANCE STONE: NORMAL
COMPN STONE: NORMAL
SPECIMEN WT: 67 MG

## 2022-08-21 DIAGNOSIS — E11.9 TYPE 2 DIABETES MELLITUS WITHOUT COMPLICATION, WITHOUT LONG-TERM CURRENT USE OF INSULIN (H): ICD-10-CM

## 2022-08-22 RX ORDER — DULAGLUTIDE 3 MG/.5ML
3 INJECTION, SOLUTION SUBCUTANEOUS WEEKLY
Qty: 6 ML | Refills: 0 | Status: SHIPPED | OUTPATIENT
Start: 2022-08-22 | End: 2022-11-20

## 2022-08-22 NOTE — TELEPHONE ENCOUNTER
"Last Written Prescription Date:  5/25/22  Last Fill Quantity: 6 ml,  # refills: 0   Last office visit provider:  8/11/22     Requested Prescriptions   Pending Prescriptions Disp Refills     TRULICITY 3 MG/0.5ML SOPN [Pharmacy Med Name: TRULICITY 3MG/0.5ML SDP 0.5ML] 6 mL 0     Sig: INJECT 3 MG SUBCUTANEOUS ONCE A WEEK.       GLP-1 Agonists Protocol Failed - 8/22/2022  8:23 AM        Failed - Normal serum creatinine on file in past 12 months     Recent Labs   Lab Test 08/11/22  0807   CR 0.61*       Ok to refill medication if creatinine is low          Passed - HgbA1C in past 3 or 6 months     If HgbA1C is 8 or greater, it needs to be on file within the past 3 months.  If less than 8, must be on file within the past 6 months.     Recent Labs   Lab Test 05/25/22  1539   A1C 8.7*             Passed - Medication is active on med list        Passed - Patient is age 18 or older        Passed - Recent (6 mo) or future (30 days) visit within the authorizing provider's specialty     Patient had office visit in the last 6 months or has a visit in the next 30 days with authorizing provider.  See \"Patient Info\" tab in inbasket, or \"Choose Columns\" in Meds & Orders section of the refill encounter.                 Seth Martinez RN 08/22/22 8:23 AM  "

## 2022-08-30 ENCOUNTER — APPOINTMENT (OUTPATIENT)
Dept: RADIOLOGY | Facility: HOSPITAL | Age: 72
End: 2022-08-30
Attending: UROLOGY
Payer: COMMERCIAL

## 2022-08-30 ENCOUNTER — ANESTHESIA EVENT (OUTPATIENT)
Dept: SURGERY | Facility: HOSPITAL | Age: 72
End: 2022-08-30
Payer: COMMERCIAL

## 2022-08-30 ENCOUNTER — ANESTHESIA (OUTPATIENT)
Dept: SURGERY | Facility: HOSPITAL | Age: 72
End: 2022-08-30
Payer: COMMERCIAL

## 2022-08-30 ENCOUNTER — HOSPITAL ENCOUNTER (OUTPATIENT)
Facility: HOSPITAL | Age: 72
Discharge: HOME | End: 2022-08-30
Attending: UROLOGY | Admitting: UROLOGY
Payer: COMMERCIAL

## 2022-08-30 VITALS
DIASTOLIC BLOOD PRESSURE: 83 MMHG | BODY MASS INDEX: 33.37 KG/M2 | OXYGEN SATURATION: 97 % | HEART RATE: 82 BPM | RESPIRATION RATE: 18 BRPM | SYSTOLIC BLOOD PRESSURE: 163 MMHG | WEIGHT: 226 LBS | TEMPERATURE: 97.8 F

## 2022-08-30 LAB
GLUCOSE BLDC GLUCOMTR-MCNC: 134 MG/DL (ref 70–99)
GLUCOSE BLDC GLUCOMTR-MCNC: 138 MG/DL (ref 70–99)

## 2022-08-30 PROCEDURE — 250N000009 HC RX 250: Performed by: NURSE ANESTHETIST, CERTIFIED REGISTERED

## 2022-08-30 PROCEDURE — 999N000180 XR SURGERY CARM FLUORO LESS THAN 5 MIN

## 2022-08-30 PROCEDURE — 999N000141 HC STATISTIC PRE-PROCEDURE NURSING ASSESSMENT: Performed by: UROLOGY

## 2022-08-30 PROCEDURE — C1769 GUIDE WIRE: HCPCS | Performed by: UROLOGY

## 2022-08-30 PROCEDURE — 258N000003 HC RX IP 258 OP 636: Performed by: NURSE ANESTHETIST, CERTIFIED REGISTERED

## 2022-08-30 PROCEDURE — 250N000011 HC RX IP 250 OP 636: Performed by: NURSE PRACTITIONER

## 2022-08-30 PROCEDURE — 710N000009 HC RECOVERY PHASE 1, LEVEL 1, PER MIN: Performed by: UROLOGY

## 2022-08-30 PROCEDURE — 370N000017 HC ANESTHESIA TECHNICAL FEE, PER MIN: Performed by: UROLOGY

## 2022-08-30 PROCEDURE — 82962 GLUCOSE BLOOD TEST: CPT

## 2022-08-30 PROCEDURE — 272N000001 HC OR GENERAL SUPPLY STERILE: Performed by: UROLOGY

## 2022-08-30 PROCEDURE — C2617 STENT, NON-COR, TEM W/O DEL: HCPCS | Performed by: UROLOGY

## 2022-08-30 PROCEDURE — 250N000011 HC RX IP 250 OP 636: Performed by: NURSE ANESTHETIST, CERTIFIED REGISTERED

## 2022-08-30 PROCEDURE — 258N000003 HC RX IP 258 OP 636: Performed by: ANESTHESIOLOGY

## 2022-08-30 PROCEDURE — 255N000002 HC RX 255 OP 636: Performed by: UROLOGY

## 2022-08-30 PROCEDURE — 250N000025 HC SEVOFLURANE, PER MIN: Performed by: UROLOGY

## 2022-08-30 PROCEDURE — 360N000083 HC SURGERY LEVEL 3 W/ FLUORO, PER MIN: Performed by: UROLOGY

## 2022-08-30 PROCEDURE — 710N000012 HC RECOVERY PHASE 2, PER MINUTE: Performed by: UROLOGY

## 2022-08-30 PROCEDURE — 250N000013 HC RX MED GY IP 250 OP 250 PS 637: Performed by: UROLOGY

## 2022-08-30 DEVICE — URETERAL STENT
Type: IMPLANTABLE DEVICE | Site: URETER | Status: FUNCTIONAL
Brand: PERCUFLEX™ PLUS

## 2022-08-30 RX ORDER — ACETAMINOPHEN 325 MG/1
650 TABLET ORAL EVERY 4 HOURS PRN
Status: DISCONTINUED | OUTPATIENT
Start: 2022-08-30 | End: 2022-08-30 | Stop reason: HOSPADM

## 2022-08-30 RX ORDER — HYDROMORPHONE HYDROCHLORIDE 1 MG/ML
0.2 INJECTION, SOLUTION INTRAMUSCULAR; INTRAVENOUS; SUBCUTANEOUS EVERY 5 MIN PRN
Status: DISCONTINUED | OUTPATIENT
Start: 2022-08-30 | End: 2022-08-30 | Stop reason: HOSPADM

## 2022-08-30 RX ORDER — FENTANYL CITRATE 50 UG/ML
25 INJECTION, SOLUTION INTRAMUSCULAR; INTRAVENOUS
Status: CANCELLED | OUTPATIENT
Start: 2022-08-30

## 2022-08-30 RX ORDER — ONDANSETRON 4 MG/1
4 TABLET, ORALLY DISINTEGRATING ORAL EVERY 30 MIN PRN
Status: DISCONTINUED | OUTPATIENT
Start: 2022-08-30 | End: 2022-08-30 | Stop reason: HOSPADM

## 2022-08-30 RX ORDER — OXYCODONE HYDROCHLORIDE 5 MG/1
5 TABLET ORAL EVERY 4 HOURS PRN
Status: DISCONTINUED | OUTPATIENT
Start: 2022-08-30 | End: 2022-08-30 | Stop reason: HOSPADM

## 2022-08-30 RX ORDER — TOLTERODINE TARTRATE 1 MG/1
2 TABLET, EXTENDED RELEASE ORAL
Status: COMPLETED | OUTPATIENT
Start: 2022-08-30 | End: 2022-08-30

## 2022-08-30 RX ORDER — KETAMINE HYDROCHLORIDE 10 MG/ML
INJECTION INTRAMUSCULAR; INTRAVENOUS PRN
Status: DISCONTINUED | OUTPATIENT
Start: 2022-08-30 | End: 2022-08-30

## 2022-08-30 RX ORDER — HYDROCODONE BITARTRATE AND ACETAMINOPHEN 5; 325 MG/1; MG/1
1 TABLET ORAL EVERY 4 HOURS PRN
Status: DISCONTINUED | OUTPATIENT
Start: 2022-08-30 | End: 2022-08-30 | Stop reason: HOSPADM

## 2022-08-30 RX ORDER — PROPOFOL 10 MG/ML
INJECTION, EMULSION INTRAVENOUS PRN
Status: DISCONTINUED | OUTPATIENT
Start: 2022-08-30 | End: 2022-08-30

## 2022-08-30 RX ORDER — SODIUM CHLORIDE, SODIUM LACTATE, POTASSIUM CHLORIDE, CALCIUM CHLORIDE 600; 310; 30; 20 MG/100ML; MG/100ML; MG/100ML; MG/100ML
INJECTION, SOLUTION INTRAVENOUS CONTINUOUS
Status: DISCONTINUED | OUTPATIENT
Start: 2022-08-30 | End: 2022-08-30 | Stop reason: HOSPADM

## 2022-08-30 RX ORDER — ATROPA BELLADONNA AND OPIUM 16.2; 3 MG/1; MG/1
1 SUPPOSITORY RECTAL EVERY 8 HOURS PRN
Status: DISCONTINUED | OUTPATIENT
Start: 2022-08-30 | End: 2022-08-30 | Stop reason: HOSPADM

## 2022-08-30 RX ORDER — FENTANYL CITRATE 50 UG/ML
25 INJECTION, SOLUTION INTRAMUSCULAR; INTRAVENOUS EVERY 5 MIN PRN
Status: DISCONTINUED | OUTPATIENT
Start: 2022-08-30 | End: 2022-08-30 | Stop reason: HOSPADM

## 2022-08-30 RX ORDER — NALOXONE HYDROCHLORIDE 0.4 MG/ML
0.2 INJECTION, SOLUTION INTRAMUSCULAR; INTRAVENOUS; SUBCUTANEOUS
Status: DISCONTINUED | OUTPATIENT
Start: 2022-08-30 | End: 2022-08-30 | Stop reason: HOSPADM

## 2022-08-30 RX ORDER — NALOXONE HYDROCHLORIDE 0.4 MG/ML
0.4 INJECTION, SOLUTION INTRAMUSCULAR; INTRAVENOUS; SUBCUTANEOUS
Status: DISCONTINUED | OUTPATIENT
Start: 2022-08-30 | End: 2022-08-30 | Stop reason: HOSPADM

## 2022-08-30 RX ORDER — DEXAMETHASONE SODIUM PHOSPHATE 10 MG/ML
INJECTION, SOLUTION INTRAMUSCULAR; INTRAVENOUS PRN
Status: DISCONTINUED | OUTPATIENT
Start: 2022-08-30 | End: 2022-08-30

## 2022-08-30 RX ORDER — FENTANYL CITRATE 50 UG/ML
INJECTION, SOLUTION INTRAMUSCULAR; INTRAVENOUS PRN
Status: DISCONTINUED | OUTPATIENT
Start: 2022-08-30 | End: 2022-08-30

## 2022-08-30 RX ORDER — LIDOCAINE 40 MG/G
CREAM TOPICAL
Status: DISCONTINUED | OUTPATIENT
Start: 2022-08-30 | End: 2022-08-30 | Stop reason: HOSPADM

## 2022-08-30 RX ORDER — ONDANSETRON 2 MG/ML
4 INJECTION INTRAMUSCULAR; INTRAVENOUS EVERY 30 MIN PRN
Status: DISCONTINUED | OUTPATIENT
Start: 2022-08-30 | End: 2022-08-30 | Stop reason: HOSPADM

## 2022-08-30 RX ORDER — ONDANSETRON 2 MG/ML
INJECTION INTRAMUSCULAR; INTRAVENOUS PRN
Status: DISCONTINUED | OUTPATIENT
Start: 2022-08-30 | End: 2022-08-30

## 2022-08-30 RX ORDER — LIDOCAINE HYDROCHLORIDE 10 MG/ML
INJECTION, SOLUTION INFILTRATION; PERINEURAL PRN
Status: DISCONTINUED | OUTPATIENT
Start: 2022-08-30 | End: 2022-08-30

## 2022-08-30 RX ORDER — MEPERIDINE HYDROCHLORIDE 25 MG/ML
12.5 INJECTION INTRAMUSCULAR; INTRAVENOUS; SUBCUTANEOUS
Status: DISCONTINUED | OUTPATIENT
Start: 2022-08-30 | End: 2022-08-30 | Stop reason: HOSPADM

## 2022-08-30 RX ORDER — CEFAZOLIN SODIUM/WATER 2 G/20 ML
2 SYRINGE (ML) INTRAVENOUS SEE ADMIN INSTRUCTIONS
Status: DISCONTINUED | OUTPATIENT
Start: 2022-08-30 | End: 2022-08-30 | Stop reason: HOSPADM

## 2022-08-30 RX ORDER — CEFAZOLIN SODIUM/WATER 2 G/20 ML
2 SYRINGE (ML) INTRAVENOUS
Status: COMPLETED | OUTPATIENT
Start: 2022-08-30 | End: 2022-08-30

## 2022-08-30 RX ADMIN — SUGAMMADEX 400 MG: 100 INJECTION, SOLUTION INTRAVENOUS at 15:57

## 2022-08-30 RX ADMIN — PROPOFOL 160 MG: 10 INJECTION, EMULSION INTRAVENOUS at 15:16

## 2022-08-30 RX ADMIN — KETAMINE HYDROCHLORIDE 50 MG: 10 INJECTION, SOLUTION INTRAMUSCULAR; INTRAVENOUS at 15:16

## 2022-08-30 RX ADMIN — PHENYLEPHRINE HYDROCHLORIDE 100 MCG: 10 INJECTION INTRAVENOUS at 15:16

## 2022-08-30 RX ADMIN — DEXAMETHASONE SODIUM PHOSPHATE 4 MG: 10 INJECTION, SOLUTION INTRAMUSCULAR; INTRAVENOUS at 15:16

## 2022-08-30 RX ADMIN — TOLTERODINE TARTRATE 2 MG: 1 TABLET, FILM COATED ORAL at 16:46

## 2022-08-30 RX ADMIN — SODIUM CHLORIDE, POTASSIUM CHLORIDE, SODIUM LACTATE AND CALCIUM CHLORIDE: 600; 310; 30; 20 INJECTION, SOLUTION INTRAVENOUS at 13:29

## 2022-08-30 RX ADMIN — ONDANSETRON 4 MG: 2 INJECTION INTRAMUSCULAR; INTRAVENOUS at 15:49

## 2022-08-30 RX ADMIN — Medication 2 G: at 15:07

## 2022-08-30 RX ADMIN — FENTANYL CITRATE 100 MCG: 50 INJECTION, SOLUTION INTRAMUSCULAR; INTRAVENOUS at 15:16

## 2022-08-30 RX ADMIN — ROCURONIUM BROMIDE 50 MG: 50 INJECTION, SOLUTION INTRAVENOUS at 15:16

## 2022-08-30 RX ADMIN — LIDOCAINE HYDROCHLORIDE 50 MG: 10 INJECTION, SOLUTION INFILTRATION; PERINEURAL at 15:16

## 2022-08-30 ASSESSMENT — LIFESTYLE VARIABLES: TOBACCO_USE: 1

## 2022-08-30 ASSESSMENT — ACTIVITIES OF DAILY LIVING (ADL)
ADLS_ACUITY_SCORE: 35

## 2022-08-30 NOTE — ANESTHESIA PREPROCEDURE EVALUATION
Anesthesia Pre-Procedure Evaluation    Patient: Regis Brooke   MRN: [id number] : 1950        Procedure : Procedure(s):  CYSTOSCOPY, RIGHT  RETROGRADE PYELOGRAM, RIGHT URETEROSCOPY WITH LASER LITHOTRIPSY, RIGHT URETERAL STENT EXCHANGE, LEFT URETERAL STENT REMOVAL          Past Medical History:   Diagnosis Date     Anemia      Benign prostatic hyperplasia with lower urinary tract symptoms, symptom details unspecified      Calculus of kidney     Created by Conversion      Cognitive deficits     wife states not prominent     Diverticular disease of colon      Essential hypertension     Lisinopril and norvasc.  Replacement Utility updated for latest IMO load     [place] (hard of hearing)      Hyperlipidemia     Created by Conversion      Lumbago     Created by Conversion      Malignant neoplasm of colon (H)      Malignant neoplasm of kidney excluding renal pelvis (H)     Created by Conversion [facility] Annotation: May 29 2011  4:34PM - Negar Lawler: RIGHT KIDNEY S/P  PARTIAL NEPHRECTOMY, 2011  Replacement Utility updated for latest IMO load     Obesity      Type 2 diabetes mellitus without complication (H)     Created by Conversion      Vitamin D deficiency     Created by Conversion  Replacement Utility updated for latest IMO load      Past Surgical History:   Procedure Laterality Date     COLECTOMY RIGHT N/A 2021    Procedure: OPEN RIGHT COLECTOMY;  Surgeon: Ana Dorsey MD;  Location: [facility] [place] [facility]     COMBINED CYSTOSCOPY, INSERT STENT URETER(S) Right 2021    Procedure: CYSTOSCOPY, RIGHT STENT AND URETERAL CATHETERIZATION;  Surgeon: Wyatt Mcnulty MD;  Location: [facility] [place] OR     CYSTOURETEROSCOPY, WITH LITHOTRIPSY USING WENDY 120P LASER AND URETERAL STENT INSERTION Bilateral 2022    Procedure: CYSTOSCOPY, LEFT URETEROSCOPY, LASER LITHOTRIPSY ON LEFT,  LEFT RETROGRADE PYELOGRAM, LEFT URETERAL STENT PLACEMENT, RIGHT URETEROSCOPY, RIGHT RETROGRADE PYELOGRAM,  RIGHT URETERAL STENT  PLACEMENT;  Surgeon: Jason Zuniga MD;  Location: [facility] [place]     HC REMOVAL GALLBLADDER      Description: Cholecystectomy;  Recorded: 05/05/2008;     HC REPAIR UMBILICAL RITU,<6Y/O,REDUC      Description: Umbilical Hernia Repair;  Recorded: 05/05/2008;     IR LUMBAR EPIDURAL STEROID INJECTION  11/6/2008     LAPAROSCOPIC ASSISTED COLECTOMY N/A 7/30/2021    Procedure: LAPAROSCOPIC;  Surgeon: Ana Dorsey MD;  Location: [facility] [place]     ZZ REMV KIDNEY,W/RIB RESECTION      Description: Nephrectomy;  Recorded: 09/12/2011;  Comments: RIGHT PARTIAL NEPHRECTOMY, 11/2010      Allergies   Allergen Reactions     Morphine Nausea and Vomiting      Social History     Tobacco Use     Smoking status: Former Smoker     Smokeless tobacco: Never Used   Substance Use Topics     Alcohol use: Yes     Alcohol/week: 1.0 standard drink     Types: 1 Glasses of wine per week     Comment: VERY LITTLE      Wt Readings from Last 1 Encounters:   08/30/22 102.5 kg (226 lb)        Anesthesia Evaluation   Pt has had prior anesthetic.     No history of anesthetic complications       ROS/MED HX  ENT/Pulmonary:     (+) tobacco use, Past use,     Neurologic: Comment: Cognitive deficits - neg neurologic ROS     Cardiovascular:     (+) Dyslipidemia hypertension-----    METS/Exercise Tolerance: >4 METS    Hematologic:     (+) anemia,     Musculoskeletal:  - neg musculoskeletal ROS     GI/Hepatic:  - neg GI/hepatic ROS     Renal/Genitourinary:     (+) renal disease (renal cell Ca), Nephrolithiasis , BPH,     Endo:     (+) type II DM, Using insulin, Obesity,  (-) Type I DM   Psychiatric/Substance Use:  - neg psychiatric ROS     Infectious Disease:  - neg infectious disease ROS     Malignancy:  - neg malignancy ROS     Other:  - neg other ROS          Physical Exam    Airway  airway exam normal      Mallampati: II   TM distance: > 3 FB   Neck ROM: full   Mouth opening: > 3 cm    Respiratory Devices and Support         Dental  no notable  dental history     (+) caps      Cardiovascular   cardiovascular exam normal          Pulmonary   pulmonary exam normal                OUTSIDE LABS:  CBC:   Lab Results   Component Value Date    WBC 9.3 08/11/2021    WBC 8.8 08/01/2021    HGB 14.9 08/11/2022    HGB 14.6 05/25/2022    HCT 31.2 (L) 08/11/2021    HCT 32.3 (L) 08/01/2021     08/11/2021     08/01/2021     BMP:   Lab Results   Component Value Date     08/11/2022     08/02/2021    POTASSIUM 4.1 08/11/2022    POTASSIUM 3.7 08/02/2021    CHLORIDE 101 08/11/2022    CHLORIDE 105 08/02/2021    CO2 23 08/11/2022    CO2 24 08/02/2021    BUN 10.1 08/11/2022    BUN 6 (L) 08/02/2021    CR 0.61 (L) 08/11/2022    CR 0.6 (L) 05/28/2022     (H) 08/30/2022     (H) 08/16/2022     COAGS: No results found for: PTT, INR, FIBR  POC: No results found for: BGM, HCG, HCGS  HEPATIC:   Lab Results   Component Value Date    ALBUMIN 3.2 (L) 07/21/2021    PROTTOTAL 6.5 07/21/2021    ALT <9 07/21/2021    AST 11 07/21/2021    ALKPHOS 72 07/21/2021    BILITOTAL 0.3 07/21/2021     OTHER:   Lab Results   Component Value Date    A1C 8.7 (H) 05/25/2022    EDER 9.7 08/11/2022    MAG 2.0 07/31/2021    LIPASE 31 03/07/2020    TSH 1.81 07/17/2018       Anesthesia Plan    ASA Status:  2   NPO Status:  NPO Appropriate    Anesthesia Type: General.     - Airway: ETT   Induction: Intravenous, Propofol.   Maintenance: Balanced.        Consents    Anesthesia Plan(s) and associated risks, benefits, and realistic alternatives discussed. Questions answered and patient/representative(s) expressed understanding.    - Discussed:     - Discussed with:  Patient, Spouse      - Extended Intubation/Ventilatory Support Discussed: No.      - Patient is DNR/DNI Status: No    Use of blood products discussed: No .     Postoperative Care    Pain management: IV analgesics, Multi-modal analgesia.   PONV prophylaxis: Ondansetron (or other 5HT-3), Dexamethasone or Solumedrol,  Droperidol or Haldol     Comments:    Other Comments: 50 mg ketamine IV on induction.            Cecilia Fontenot MD

## 2022-08-30 NOTE — OP NOTE
Location: [facility]     Patient Name: Regis Brooke  Patient : 1950  Patient MRN: [id number]  Patient CSN: [id number]  Patient PCP: Priti Charles    Date of Service: 22     Pre-operative diagnosis: Right ureteral stone, bilateral ureteral stents    Post-operative diagnosis: No right ureteral stone, bilateral ureteral stents    Procedure:  Cystoscopy, Right retrograde pyelogram, Right ureteroscopy, Right 6 Fr x 28 cm ureteral stent exchange, left ureteral stent removal    Surgeon: Dr. Jason Zuniga    EBL: 5 mL    Anesthesia: General    Indication: 72 year old male who was found to have a possible 4 mm right UVJ stone along with a left UVJ stone and left renal stone.  He had the left side treated on 22 via ureteroscopy and stent placement.  The right distal ureter was too tight for ureteroscopy to prove if a stone was present or not so a stent was placed (4.8 Fr as a 6 Fr would not pass).  He presents today to remove the left ureteral stent and examine the right ureter via ureteroscopy.  Risks, benefits, and alternatives to treatment were discussed with the patient and the patient elected to proceed.    Findings: His urethra was normal. He had a trilobar prostatic hyperplasia with a large ball-valve median lobe.  He had mild bladder trabeculation.   Bilateral ureteral stents were in good position.  The left ureteral stent removed without difficulty.  No right hydronephrosis on retrograde pyelogram. The right ureter was still too tight for rigid ureteroscopy with a safety wire in place but I was able to place a flexible ureteroscope into the right kidney and evaluate the kidney and ureter. No right ureteral stone was seen.  No tumors in the ureter either.   A new right ureteral stent was in good position.    Specimens: None    Procedure:  After written informed consent was obtained the patient was brought into the operating room.  The patient was properly identified prior to the procedure,  received preprocedure antibiotics, and had sequential compression devices on the legs.  The patient was then induced under anesthesia.    The patient was placed in dorsal lithotomy position and prepped and draped in the usual sterile fashion.  Cystoscopy was performed with the above findings.    The left ureteral stent was removed.      The Right ureteral stent was grasped and brought to the urethral meatus.  A straight sensor wire was placed into the Right kidney through the stent and the stent was removed.     Right rigid ureteroscopy was attempted but I could not advance passed the ureteral orifice.    A Right retrograde pyelogram was performed with the above finding using a 5 Fr open-ended ureteral catheter over the straight sensor wire.  A super stiff wire was placed into the right kidney via the 5 Fr open-ended ureteral catheter. Over the super stiff wire, a flexible ureteroscope was advanced into the right kidney without resistance.  The right kidney and ureter were fully evaluated and no stones were seen.  Just prior to exiting the ureter, I placed a straight sensor wire through the ureteroscope and into the right kidney and removed the ureteroscope.  The straight sensor wire was exchanged for the Super Stiff wire using a 5 Fr open-ended ureteral catheter (with opacification of the kidney before the Super Stiff wire was placed).  A 6 Fr x 28 cm ureteral stent was then deployed over the Super Stiff wire. There was a good coil in the kidney and a good coil in the bladder.  I verified the distal coil via cystoscopy. I emptied the bladder and removed the scope. At this point, the procedure was completed.  He tolerated the procedure well.    Plan: Home. Follow up 9/14/22 for stent removal.  Given configuration of the prostate (I.e. median lobe), if he has voiding concerns, a TURP would likely be helpful.    Jason Zuniga MD  3:56 PM

## 2022-08-30 NOTE — INTERVAL H&P NOTE
"I have reviewed the surgical (or preoperative) H&P that is linked to this encounter, and examined the patient. Noted changes include: s/p bilateral stent placement and left ureteroscopy with laser lithotripsy (unable to perform right ureteroscopy due to tight ureter).  Tolerating the stents well with some mild stent colic.  Here for left stent removal and right ureteroscopy.    Clinical Conditions Present on Arrival:  Clinically Significant Risk Factors Present on Admission                   # Obesity: Estimated body mass index is 33.37 kg/m  as calculated from the following:    Height as of 8/11/22: 1.753 m (5' 9\").    Weight as of this encounter: 102.5 kg (226 lb).       "

## 2022-08-30 NOTE — ANESTHESIA POSTPROCEDURE EVALUATION
Patient: Regis Brooke    Procedure: Procedure(s):  CYSTOSCOPY, RIGHT  RETROGRADE PYELOGRAM, RIGHT URETEROSCOPY, RIGHT URETERAL STENT EXCHANGE, LEFT URETERAL STENT REMOVAL       Anesthesia Type:  General    Note:  Disposition: Outpatient   Postop Pain Control: Uneventful            Sign Out: Well controlled pain   PONV: No   Neuro/Psych: Uneventful            Sign Out: Acceptable/Baseline neuro status   Airway/Respiratory: Uneventful            Sign Out: Acceptable/Baseline resp. status   CV/Hemodynamics: Uneventful            Sign Out: Acceptable CV status; No obvious hypovolemia; No obvious fluid overload   Other NRE: NONE   DID A NON-ROUTINE EVENT OCCUR? No           Last vitals:  Vitals Value Taken Time   /96 08/30/22 1645   Temp 36.9  C (98.4  F) 08/30/22 1645   Pulse 77 08/30/22 1659   Resp 13 08/30/22 1659   SpO2 95 % 08/30/22 1659   Vitals shown include unvalidated device data.    Electronically Signed By: Regis Zepeda MD  August 30, 2022  5:31 PM

## 2022-08-30 NOTE — ANESTHESIA CARE TRANSFER NOTE
Patient: Regis Brooke    Procedure: Procedure(s):  CYSTOSCOPY, RIGHT  RETROGRADE PYELOGRAM, RIGHT URETEROSCOPY, RIGHT URETERAL STENT EXCHANGE, LEFT URETERAL STENT REMOVAL       Diagnosis: Calculus of kidney with calculus of ureter [N20.2]  Diagnosis Additional Information: No value filed.    Anesthesia Type:   General     Note:    Oropharynx: oropharynx clear of all foreign objects and spontaneously breathing  Level of Consciousness: awake and drowsy  Oxygen Supplementation: face mask  Level of Supplemental Oxygen (L/min / FiO2): 5  Independent Airway: airway patency satisfactory and stable  Dentition: dentition unchanged  Vital Signs Stable: post-procedure vital signs reviewed and stable  Report to RN Given: handoff report given  Patient transferred to: PACU    Handoff Report: Identifed the Patient, Identified the Reponsible Provider, Reviewed the pertinent medical history, Discussed the surgical course, Reviewed Intra-OP anesthesia mangement and issues during anesthesia, Set expectations for post-procedure period and Allowed opportunity for questions and acknowledgement of understanding      Vitals:  Vitals Value Taken Time   /91 08/30/22 1605   Temp 36.8  C (98.3  F) 08/30/22 1604   Pulse 88 08/30/22 1606   Resp 25 08/30/22 1606   SpO2 98 % 08/30/22 1606   Vitals shown include unvalidated device data.    Electronically Signed By: IBIS Womack CRNA  August 30, 2022  4:08 PM

## 2022-08-30 NOTE — ANESTHESIA PROCEDURE NOTES
Airway       Patient location during procedure: OR       Procedure Start/Stop Times: 8/30/2022 3:20 PM  Staff -        Anesthesiologist:  Art Bueno MD       CRNA: Rama Carmona APRN CRNA       Performed By: CRNA  Consent for Airway        Urgency: elective  Indications and Patient Condition       Indications for airway management: jose carlos-procedural       Induction type:intravenous       Mask difficulty assessment: 2 - vent by mask + OA or adjuvant +/- NMBA    Final Airway Details       Final airway type: endotracheal airway       Successful airway: ETT - single  Endotracheal Airway Details        ETT size (mm): 8.0       Cuffed: yes       Successful intubation technique: direct laryngoscopy       DL Blade Type: Pappas 2       Grade View of Cords: 2       Adjucts: stylet and tooth guard       Position: Right       Measured from: gums/teeth       Secured at (cm): 22       Bite block used: None    Post intubation assessment        Placement verified by: capnometry, equal breath sounds and chest rise        Number of attempts at approach: 1       Secured with: silk tape       Ease of procedure: easy       Dentition: Intact and Unchanged    Medication(s) Administered   Medication Administration Time: 8/30/2022 3:20 PM

## 2022-09-14 ENCOUNTER — TRANSFERRED RECORDS (OUTPATIENT)
Dept: HEALTH INFORMATION MANAGEMENT | Facility: CLINIC | Age: 72
End: 2022-09-14

## 2022-09-25 ENCOUNTER — HEALTH MAINTENANCE LETTER (OUTPATIENT)
Age: 72
End: 2022-09-25

## 2022-10-14 ENCOUNTER — HOSPITAL ENCOUNTER (OUTPATIENT)
Dept: ULTRASOUND IMAGING | Facility: HOSPITAL | Age: 72
Discharge: HOME | End: 2022-10-14
Attending: UROLOGY | Admitting: UROLOGY
Payer: COMMERCIAL

## 2022-10-14 DIAGNOSIS — N20.2 CALCULUS OF KIDNEY WITH CALCULUS OF URETER: ICD-10-CM

## 2022-10-14 PROCEDURE — 76770 US EXAM ABDO BACK WALL COMP: CPT

## 2022-10-15 DIAGNOSIS — I10 ESSENTIAL HYPERTENSION: ICD-10-CM

## 2022-10-15 NOTE — TELEPHONE ENCOUNTER
"Routing refill request to provider for review/approval because:  Labs out of range:  Cr bp    Last Written Prescription Date:  4/21/22  Last Fill Quantity: 90,  # refills: 1   Last office visit provider:  8/11/22     Requested Prescriptions   Pending Prescriptions Disp Refills     lisinopril (ZESTRIL) 20 MG tablet [Pharmacy Med Name: LISINOPRIL 20MG TABLETS] 90 tablet 1     Sig: TAKE 1 TABLET(20 MG) BY MOUTH DAILY       ACE Inhibitors (Including Combos) Protocol Failed - 10/15/2022  3:15 AM        Failed - Blood pressure under 140/90 in past 12 months     BP Readings from Last 3 Encounters:   08/30/22 (!) 163/83   08/16/22 134/72   08/11/22 138/82                 Failed - Normal serum creatinine on file in past 12 months     Recent Labs   Lab Test 08/11/22  0807   CR 0.61*       Ok to refill medication if creatinine is low          Passed - Recent (12 mo) or future (30 days) visit within the authorizing provider's specialty     Patient has had an office visit with the authorizing provider or a provider within the authorizing providers department within the previous 12 mos or has a future within next 30 days. See \"Patient Info\" tab in inbasket, or \"Choose Columns\" in Meds & Orders section of the refill encounter.              Passed - Medication is active on med list        Passed - Patient is age 18 or older        Passed - Normal serum potassium on file in past 12 months     Recent Labs   Lab Test 08/11/22  0807   POTASSIUM 4.1                  Jayne Rodgers RN 10/15/22 2:11 PM  "

## 2022-10-17 NOTE — TELEPHONE ENCOUNTER
Left message to call back for: Patient  Information to relay to patient: See provider message below and help schedule. Route back to provider pool if bridge refill is needed.

## 2022-10-19 NOTE — TELEPHONE ENCOUNTER
Left message to call back for: Patient x2  Information to relay to patient: See provider message below and help schedule.  Route back to provider pool if bridge is needed.    Letter sent.    Please remove pended medications and close encounter when done.

## 2022-10-21 RX ORDER — LISINOPRIL 20 MG/1
TABLET ORAL
Qty: 90 TABLET | Refills: 1 | OUTPATIENT
Start: 2022-10-21

## 2022-10-28 ENCOUNTER — TRANSFERRED RECORDS (OUTPATIENT)
Dept: HEALTH INFORMATION MANAGEMENT | Facility: CLINIC | Age: 72
End: 2022-10-28

## 2022-11-03 ENCOUNTER — TRANSFERRED RECORDS (OUTPATIENT)
Dept: HEALTH INFORMATION MANAGEMENT | Facility: CLINIC | Age: 72
End: 2022-11-03

## 2022-11-07 DIAGNOSIS — E11.9 TYPE 2 DIABETES MELLITUS WITHOUT COMPLICATION, WITHOUT LONG-TERM CURRENT USE OF INSULIN (H): ICD-10-CM

## 2022-11-08 NOTE — TELEPHONE ENCOUNTER
"Routing refill request to provider for review/approval because:  Labs not current:  a1C    Last Written Prescription Date: 8/9/22  Last Fill Quantity: 90, # refills: 0  Last office visit provider: 5/25/22    Requested Prescriptions   Pending Prescriptions Disp Refills     glipiZIDE (GLUCOTROL XL) 10 MG 24 hr tablet [Pharmacy Med Name: GLIPIZIDE ER 10MG TABLETS] 90 tablet 0     Sig: TAKE 1 TABLET(10 MG) BY MOUTH DAILY       Sulfonylurea Agents Failed - 11/7/2022  3:16 AM        Failed - Patient has documented A1c within the specified period of time.     If HgbA1C is 8 or greater, it needs to be on file within the past 3 months.  If less than 8, must be on file within the past 6 months.     Recent Labs   Lab Test 05/25/22  1539   A1C 8.7*             Failed - Patient has a recent creatinine (normal) within the past 12 mos.     Recent Labs   Lab Test 08/11/22  0807   CR 0.61*       Ok to refill medication if creatinine is low          Passed - Medication is active on med list        Passed - Patient is age 18 or older        Passed - Recent (6 mo) or future (30 days) visit within the authorizing provider's specialty     Patient had office visit in the last 6 months or has a visit in the next 30 days with authorizing provider or within the authorizing provider's specialty.  See \"Patient Info\" tab in inbasket, or \"Choose Columns\" in Meds & Orders section of the refill encounter.                 Delmer Romero RN 11/08/22 3:27 PM     " no

## 2022-11-14 RX ORDER — GLIPIZIDE 10 MG/1
TABLET, FILM COATED, EXTENDED RELEASE ORAL
Qty: 90 TABLET | Refills: 0 | Status: SHIPPED | OUTPATIENT
Start: 2022-11-14 | End: 2023-02-12

## 2023-01-03 ENCOUNTER — OFFICE VISIT (OUTPATIENT)
Dept: FAMILY MEDICINE | Facility: CLINIC | Age: 73
End: 2023-01-03
Payer: COMMERCIAL

## 2023-01-03 VITALS
DIASTOLIC BLOOD PRESSURE: 78 MMHG | BODY MASS INDEX: 35.15 KG/M2 | HEART RATE: 96 BPM | OXYGEN SATURATION: 97 % | WEIGHT: 238 LBS | SYSTOLIC BLOOD PRESSURE: 138 MMHG

## 2023-01-03 DIAGNOSIS — Z85.528 HISTORY OF PRIMARY MALIGNANT NEOPLASM OF KIDNEY: ICD-10-CM

## 2023-01-03 DIAGNOSIS — E78.2 MIXED HYPERLIPIDEMIA: ICD-10-CM

## 2023-01-03 DIAGNOSIS — E66.09 CLASS 1 OBESITY DUE TO EXCESS CALORIES WITH SERIOUS COMORBIDITY AND BODY MASS INDEX (BMI) OF 33.0 TO 33.9 IN ADULT: ICD-10-CM

## 2023-01-03 DIAGNOSIS — E78.00 HYPERCHOLESTEROLEMIA: ICD-10-CM

## 2023-01-03 DIAGNOSIS — Z79.4 TYPE 2 DIABETES MELLITUS WITHOUT COMPLICATION, WITH LONG-TERM CURRENT USE OF INSULIN (H): Primary | ICD-10-CM

## 2023-01-03 DIAGNOSIS — E11.9 TYPE 2 DIABETES MELLITUS WITHOUT COMPLICATION, WITH LONG-TERM CURRENT USE OF INSULIN (H): Primary | ICD-10-CM

## 2023-01-03 DIAGNOSIS — E66.811 CLASS 1 OBESITY DUE TO EXCESS CALORIES WITH SERIOUS COMORBIDITY AND BODY MASS INDEX (BMI) OF 33.0 TO 33.9 IN ADULT: ICD-10-CM

## 2023-01-03 DIAGNOSIS — N20.2 CALCULUS OF KIDNEY AND URETER: ICD-10-CM

## 2023-01-03 LAB
HBA1C MFR BLD: 10.3 % (ref 0–5.6)
HOLD SPECIMEN: NORMAL

## 2023-01-03 PROCEDURE — G0008 ADMIN INFLUENZA VIRUS VAC: HCPCS | Mod: 59 | Performed by: FAMILY MEDICINE

## 2023-01-03 PROCEDURE — 99214 OFFICE O/P EST MOD 30 MIN: CPT | Mod: 25 | Performed by: FAMILY MEDICINE

## 2023-01-03 PROCEDURE — 90662 IIV NO PRSV INCREASED AG IM: CPT | Performed by: FAMILY MEDICINE

## 2023-01-03 PROCEDURE — 36415 COLL VENOUS BLD VENIPUNCTURE: CPT | Performed by: FAMILY MEDICINE

## 2023-01-03 PROCEDURE — 90472 IMMUNIZATION ADMIN EACH ADD: CPT | Performed by: FAMILY MEDICINE

## 2023-01-03 PROCEDURE — 83036 HEMOGLOBIN GLYCOSYLATED A1C: CPT | Performed by: FAMILY MEDICINE

## 2023-01-03 PROCEDURE — 91312 COVID-19 VACCINE BIVALENT BOOSTER 12+ (PFIZER): CPT | Performed by: FAMILY MEDICINE

## 2023-01-03 PROCEDURE — 0124A COVID-19 VACCINE BIVALENT BOOSTER 12+ (PFIZER): CPT | Performed by: FAMILY MEDICINE

## 2023-01-03 PROCEDURE — 90715 TDAP VACCINE 7 YRS/> IM: CPT | Performed by: FAMILY MEDICINE

## 2023-01-03 RX ORDER — DULAGLUTIDE 3 MG/.5ML
INJECTION, SOLUTION SUBCUTANEOUS
COMMUNITY
End: 2023-03-06 | Stop reason: DRUGHIGH

## 2023-01-03 RX ORDER — DULAGLUTIDE 4.5 MG/.5ML
4.5 INJECTION, SOLUTION SUBCUTANEOUS WEEKLY
Qty: 6 ML | Refills: 0 | Status: SHIPPED | OUTPATIENT
Start: 2023-01-03 | End: 2023-07-28

## 2023-01-03 RX ORDER — ATORVASTATIN CALCIUM 80 MG/1
80 TABLET, FILM COATED ORAL DAILY
Qty: 90 TABLET | Refills: 1 | Status: SHIPPED | OUTPATIENT
Start: 2023-01-03 | End: 2023-06-26

## 2023-01-03 NOTE — PROGRESS NOTES
Problem List Items Addressed This Visit        Digestive    Class 1 obesity due to excess calories with body mass index (BMI) of 33.0 to 33.9 in adult     Healthy lifestyle discussed         Relevant Medications    Dulaglutide (TRULICITY) 3 MG/0.5ML SOPN    Dulaglutide (TRULICITY) 4.5 MG/0.5ML SOPN       Endocrine    Hypercholesterolemia     lipitor 80mg po q day, refilled         Relevant Medications    atorvastatin (LIPITOR) 80 MG tablet    Other Relevant Orders    Lipid Profile    Type 2 diabetes mellitus without complication (H) - Primary     Uncontrolled and dramatically worsening diabetes -today's A1c is up from 8.7-10.3.    Continue metformin 2000 mg/day  Continue glipizide 10 mg orally per day  Discontinue  Trulicity 3 mg subq per week.  Start trulicity 4.5mg subcutaneous per week  Continue aspirin, Lipitor, lisinopril    Tdap ordered today.    Diabetic eye exam is ordered today.    Lipid orders had been placed but the patient is noncompliant with fasting so have not been done yet.  Reorder is placed today.    The patient is a former smoker and is encouraged to continue to abstain from cigarettes    Encouraged to limit alcohol    Diabetes education has been unable to schedule with him as noted in February 2022.  A repeat order for diabetes education has been placed.    Referral to endocrinology is also placed today due to his worsening diabetes in the setting of recovering colon cancer and renal cancer as well as multiple other comorbid conditions.         Relevant Medications    Dulaglutide (TRULICITY) 3 MG/0.5ML SOPN    Dulaglutide (TRULICITY) 4.5 MG/0.5ML SOPN    Other Relevant Orders    Adult Eye  Referral    [facility] Adult Diabetes Educator Referral    Adult Endocrinology  Referral       Urinary    Calculus of kidney and ureter     7/25/22 [facility] note - hx kidney cancer and kidney stones            Other    History of primary malignant neoplasm of kidney     Chart reviewed 7/25/22  [facility] note - hx renal cancer and kidney stones        Other Visit Diagnoses     Mixed hyperlipidemia        Relevant Medications    atorvastatin (LIPITOR) 80 MG tablet         Follow-up Visit   Expected date:  Apr 03, 2023 (Approximate)      Follow Up Appointment Details:     Follow-up with whom?: PCP    Follow-Up for what?: Chronic Disease f/u    Chronic Disease f/u: Diabetes    How?: In Person    Is this an as-needed follow-up?: No                     Subjective   Chet is a 72 year old who presents for the following health issues   Chief Complaint   Patient presents with     Diabetes      History of Present Illness       Diabetes:   He presents for follow up of diabetes.  He is checking home blood glucose three times daily. He checks blood glucose before meals and before and after meals.  Blood glucose is sometimes over 200 and never under 70. He is aware of hypoglycemia symptoms including shakiness, dizziness, weakness and confusion. He has no concerns regarding his diabetes at this time.  He is not experiencing numbness or burning in feet, excessive thirst, blurry vision, weight changes or redness, sores or blisters on feet. The patient has not had a diabetic eye exam in the last 12 months.         He eats 2-3 servings of fruits and vegetables daily.He consumes 2 sweetened beverage(s) daily.He exercises with enough effort to increase his heart rate 10 to 19 minutes per day.  He exercises with enough effort to increase his heart rate 3 or less days per week.   He is taking medications regularly.             Objective    /78 (BP Location: Left arm, Patient Position: Left side, Cuff Size: Adult Large)   Pulse 96   Wt 108 kg (238 lb)   SpO2 97%   BMI 35.15 kg/m    Body mass index is 35.15 kg/m .  Physical Exam  Constitutional:       Appearance: Normal appearance.   HENT:      Head: Normocephalic and atraumatic.   Cardiovascular:      Rate and Rhythm: Normal rate and regular rhythm.   Pulmonary:       Effort: Pulmonary effort is normal.   Musculoskeletal:         General: Normal range of motion.      Cervical back: Normal range of motion and neck supple.   Neurological:      General: No focal deficit present.      Mental Status: He is alert.        Diabetic foot exam: normal DP and PT pulses, no trophic changes or ulcerative lesions and normal sensory exam        This note has been dictated using voice recognition software. Any grammatical or context distortions are unintentional and inherent to the software

## 2023-01-03 NOTE — ASSESSMENT & PLAN NOTE
Uncontrolled and dramatically worsening diabetes -today's A1c is up from 8.7-10.3.    Continue metformin 2000 mg/day  Continue glipizide 10 mg orally per day  Discontinue  Trulicity 3 mg subq per week.  Start trulicity 4.5mg subcutaneous per week  Continue aspirin, Lipitor, lisinopril    Tdap ordered today.    Diabetic eye exam is ordered today.    Lipid orders had been placed but the patient is noncompliant with fasting so have not been done yet.  Reorder is placed today.    The patient is a former smoker and is encouraged to continue to abstain from cigarettes    Encouraged to limit alcohol    Diabetes education has been unable to schedule with him as noted in February 2022.  A repeat order for diabetes education has been placed.    Referral to endocrinology is also placed today due to his worsening diabetes in the setting of recovering colon cancer and renal cancer as well as multiple other comorbid conditions.

## 2023-01-04 ENCOUNTER — MYC MEDICAL ADVICE (OUTPATIENT)
Dept: FAMILY MEDICINE | Facility: CLINIC | Age: 73
End: 2023-01-04

## 2023-01-24 ENCOUNTER — MYC MEDICAL ADVICE (OUTPATIENT)
Dept: FAMILY MEDICINE | Facility: CLINIC | Age: 73
End: 2023-01-24
Payer: COMMERCIAL

## 2023-01-24 NOTE — TELEPHONE ENCOUNTER
Spoke with patient, appt made for tomorrow Wednesday 1/25/23 at 1040am.  Pt ok with appt time and date.

## 2023-01-24 NOTE — TELEPHONE ENCOUNTER
Please call patient and see if he can be put in same day appt tomorow, if not recommend urgent care.

## 2023-01-25 ENCOUNTER — OFFICE VISIT (OUTPATIENT)
Dept: FAMILY MEDICINE | Facility: CLINIC | Age: 73
End: 2023-01-25
Payer: COMMERCIAL

## 2023-01-25 VITALS
WEIGHT: 234.3 LBS | RESPIRATION RATE: 18 BRPM | BODY MASS INDEX: 34.7 KG/M2 | HEIGHT: 69 IN | OXYGEN SATURATION: 96 % | DIASTOLIC BLOOD PRESSURE: 88 MMHG | TEMPERATURE: 98.9 F | HEART RATE: 88 BPM | SYSTOLIC BLOOD PRESSURE: 148 MMHG

## 2023-01-25 DIAGNOSIS — J11.1 INFLUENZA-LIKE ILLNESS: Primary | ICD-10-CM

## 2023-01-25 DIAGNOSIS — C18.2 MALIGNANT NEOPLASM OF ASCENDING COLON (H): ICD-10-CM

## 2023-01-25 DIAGNOSIS — J01.10 SUBACUTE FRONTAL SINUSITIS: ICD-10-CM

## 2023-01-25 DIAGNOSIS — E11.9 TYPE 2 DIABETES MELLITUS WITHOUT COMPLICATION, UNSPECIFIED WHETHER LONG TERM INSULIN USE (H): ICD-10-CM

## 2023-01-25 LAB
FLUAV AG SPEC QL IA: NEGATIVE
FLUBV AG SPEC QL IA: NEGATIVE

## 2023-01-25 PROCEDURE — 87804 INFLUENZA ASSAY W/OPTIC: CPT | Performed by: FAMILY MEDICINE

## 2023-01-25 PROCEDURE — U0005 INFEC AGEN DETEC AMPLI PROBE: HCPCS

## 2023-01-25 PROCEDURE — U0003 INFECTIOUS AGENT DETECTION BY NUCLEIC ACID (DNA OR RNA); SEVERE ACUTE RESPIRATORY SYNDROME CORONAVIRUS 2 (SARS-COV-2) (CORONAVIRUS DISEASE [COVID-19]), AMPLIFIED PROBE TECHNIQUE, MAKING USE OF HIGH THROUGHPUT TECHNOLOGIES AS DESCRIBED BY CMS-2020-01-R: HCPCS

## 2023-01-25 PROCEDURE — 99214 OFFICE O/P EST MOD 30 MIN: CPT | Performed by: FAMILY MEDICINE

## 2023-01-25 RX ORDER — AZITHROMYCIN 250 MG/1
TABLET, FILM COATED ORAL
Qty: 6 TABLET | Refills: 0 | Status: SHIPPED | OUTPATIENT
Start: 2023-01-25 | End: 2023-01-30

## 2023-01-25 NOTE — PROGRESS NOTES
"      Problem List Items Addressed This Visit        Digestive    Malignant neoplasm of ascending colon (H)     Surgically excised.            Endocrine    Type 2 diabetes mellitus without complication (H)   Other Visit Diagnoses     Influenza-like illness    -  Primary    Relevant Orders    Influenza A & B Antigen - Clinic Collect (Completed)    Subacute frontal sinusitis        Relevant Medications    azithromycin (ZITHROMAX) 250 MG tablet             Subjective   Chet is a 72 year old who presents for the following health issues   He says he has been sick for 7 days and he has trouble breathing because of nasal congestion and frontal headache.  He has had no fever, chills or sweats.  He says Sudafed helps a little.  He is noted cough, runny and stuffy nose as well as a sore throat.  Cough drops seem to help.  He is just not getting better and wonders what else can be done.  Chief Complaint   Patient presents with     Cold Symptoms     Chest cold, sinus congestive, cough especially lying down, headache, have not had any COVID test in the last 30 days.      History of Present Illness       Reason for visit:  Respiratory problems  Symptom onset:  1-2 weeks ago  Symptoms include:  Breathing & congestion  Symptom intensity:  Severe  Symptom progression:  Staying the same  Had these symptoms before:  No    He eats 2-3 servings of fruits and vegetables daily.He consumes 1 sweetened beverage(s) daily.He exercises with enough effort to increase his heart rate 9 or less minutes per day.  He exercises with enough effort to increase his heart rate 3 or less days per week.   He is taking medications regularly.           Objective    BP (!) 148/88 (BP Location: Left arm, Patient Position: Sitting, Cuff Size: Adult Regular)   Pulse 88   Temp 98.9  F (37.2  C) (Oral)   Resp 18   Ht 1.753 m (5' 9\")   Wt 106.3 kg (234 lb 4.8 oz)   SpO2 96%   BMI 34.60 kg/m    Body mass index is 34.6 kg/m .  Physical Exam  Constitutional:     "   Appearance: Normal appearance.   HENT:      Head: Normocephalic and atraumatic.      Right Ear: Tympanic membrane, ear canal and external ear normal.      Left Ear: Tympanic membrane, ear canal and external ear normal.      Nose: Congestion (right worse than left) present.      Right Sinus: Frontal sinus tenderness present.      Left Sinus: Frontal sinus tenderness present.      Mouth/Throat:      Mouth: Mucous membranes are moist.      Pharynx: Oropharynx is clear.   Eyes:      Conjunctiva/sclera: Conjunctivae normal.      Pupils: Pupils are equal, round, and reactive to light.   Cardiovascular:      Rate and Rhythm: Normal rate and regular rhythm.      Heart sounds: Normal heart sounds.   Pulmonary:      Effort: Pulmonary effort is normal.      Breath sounds: Normal breath sounds.   Abdominal:      General: Bowel sounds are normal.      Palpations: Abdomen is soft.   Musculoskeletal:         General: Normal range of motion.      Cervical back: Normal range of motion and neck supple.   Skin:     General: Skin is warm.      Capillary Refill: Capillary refill takes less than 2 seconds.   Neurological:      General: No focal deficit present.      Mental Status: He is alert and oriented to person, place, and time.   Psychiatric:         Mood and Affect: Mood normal.         Behavior: Behavior normal.         Thought Content: Thought content normal.         Judgment: Judgment normal.            This note has been dictated using voice recognition software. Any grammatical or context distortions are unintentional and inherent to the software

## 2023-01-26 LAB — SARS-COV-2 RNA RESP QL NAA+PROBE: NEGATIVE

## 2023-02-11 DIAGNOSIS — E11.9 TYPE 2 DIABETES MELLITUS WITHOUT COMPLICATION, WITHOUT LONG-TERM CURRENT USE OF INSULIN (H): ICD-10-CM

## 2023-02-12 RX ORDER — GLIPIZIDE 10 MG/1
TABLET, FILM COATED, EXTENDED RELEASE ORAL
Qty: 90 TABLET | Refills: 0 | Status: SHIPPED | OUTPATIENT
Start: 2023-02-12 | End: 2023-05-15

## 2023-02-13 NOTE — TELEPHONE ENCOUNTER
"Routing refill request to provider for review/approval because:  Labs out of range:  cr    Last Written Prescription Date:  11/14/22  Last Fill Quantity: 90,  # refills: 0   Last office visit provider:  1/25/23     Requested Prescriptions   Pending Prescriptions Disp Refills     glipiZIDE (GLUCOTROL XL) 10 MG 24 hr tablet [Pharmacy Med Name: GLIPIZIDE ER 10MG TABLETS] 90 tablet 0     Sig: TAKE 1 TABLET(10 MG) BY MOUTH DAILY       Sulfonylurea Agents Failed - 2/11/2023  3:16 AM        Failed - Patient has a recent creatinine (normal) within the past 12 mos.     Recent Labs   Lab Test 08/11/22  0807   CR 0.61*       Ok to refill medication if creatinine is low          Passed - Patient has documented A1c within the specified period of time.     If HgbA1C is 8 or greater, it needs to be on file within the past 3 months.  If less than 8, must be on file within the past 6 months.     Recent Labs   Lab Test 01/03/23  1115   A1C 10.3*             Passed - Medication is active on med list        Passed - Patient is age 18 or older        Passed - Recent (6 mo) or future (30 days) visit within the authorizing provider's specialty     Patient had office visit in the last 6 months or has a visit in the next 30 days with authorizing provider or within the authorizing provider's specialty.  See \"Patient Info\" tab in inbasket, or \"Choose Columns\" in Meds & Orders section of the refill encounter.                 Jayne Rodgers RN 02/12/23 8:09 PM  "

## 2023-02-16 ENCOUNTER — TRANSFERRED RECORDS (OUTPATIENT)
Dept: HEALTH INFORMATION MANAGEMENT | Facility: CLINIC | Age: 73
End: 2023-02-16

## 2023-02-16 LAB
RETINOPATHY: NEGATIVE
RETINOPATHY: NEGATIVE

## 2023-02-23 DIAGNOSIS — E78.2 MIXED HYPERLIPIDEMIA: ICD-10-CM

## 2023-02-24 RX ORDER — ATORVASTATIN CALCIUM 80 MG/1
TABLET, FILM COATED ORAL
Qty: 90 TABLET | Refills: 1 | OUTPATIENT
Start: 2023-02-24

## 2023-02-24 NOTE — TELEPHONE ENCOUNTER
Refill request Refused - patient should have refills on file    Atorvastatin 80mg was refilled on 1/3/2023 - Disp 90, Ref 1        Glo Onofre RN  02/24/23 3:05 PM  [facility] Nurse Advisor

## 2023-02-25 DIAGNOSIS — E11.9 TYPE 2 DIABETES MELLITUS WITHOUT COMPLICATION (H): ICD-10-CM

## 2023-02-27 NOTE — TELEPHONE ENCOUNTER
"Last Written Prescription Date: 11/23/22  Last Fill Quantity: 360,  # refills: 0  Last office visit provider: 1/25/23    Requested Prescriptions   Pending Prescriptions Disp Refills     metFORMIN (GLUCOPHAGE) 500 MG tablet [Pharmacy Med Name: METFORMIN 500MG TABLETS] 360 tablet 0     Sig: TAKE 2 TABLETS(1000 MG) BY MOUTH TWICE DAILY WITH MEALS       Biguanide Agents Passed - 2/25/2023  3:16 AM        Passed - Patient is age 10 or older        Passed - Patient has documented A1c within the specified period of time.     If HgbA1C is 8 or greater, it needs to be on file within the past 3 months.  If less than 8, must be on file within the past 6 months.     Recent Labs   Lab Test 01/03/23  1115   A1C 10.3*             Passed - Patient's CR is NOT>1.4 OR Patient's EGFR is NOT<45 within past 12 mos.     Recent Labs   Lab Test 08/11/22  0807 07/21/21  1517 12/18/20  1421   GFRESTIMATED >90   < > >60   GFRESTBLACK  --   --  >60    < > = values in this interval not displayed.       Recent Labs   Lab Test 08/11/22  0807   CR 0.61*             Passed - Patient does NOT have a diagnosis of CHF.        Passed - Medication is active on med list        Passed - Recent (6 mo) or future (30 days) visit within the authorizing provider's specialty     Patient had office visit in the last 6 months or has a visit in the next 30 days with authorizing provider or within the authorizing provider's specialty.  See \"Patient Info\" tab in inbasket, or \"Choose Columns\" in Meds & Orders section of the refill encounter.                 Bonnie Fletcher RN 02/26/23 8:12 PM  "

## 2023-03-02 ENCOUNTER — MYC MEDICAL ADVICE (OUTPATIENT)
Dept: FAMILY MEDICINE | Facility: CLINIC | Age: 73
End: 2023-03-02
Payer: COMMERCIAL

## 2023-03-03 NOTE — TELEPHONE ENCOUNTER
Left message to call back for: Patient  Information to relay to patient: Please help schedule with Leslie Suazo to address concerns in MyChart message.

## 2023-03-06 ENCOUNTER — OFFICE VISIT (OUTPATIENT)
Dept: FAMILY MEDICINE | Facility: CLINIC | Age: 73
End: 2023-03-06
Payer: COMMERCIAL

## 2023-03-06 VITALS
TEMPERATURE: 98.6 F | WEIGHT: 235.7 LBS | HEART RATE: 95 BPM | BODY MASS INDEX: 34.91 KG/M2 | OXYGEN SATURATION: 97 % | HEIGHT: 69 IN | SYSTOLIC BLOOD PRESSURE: 168 MMHG | RESPIRATION RATE: 16 BRPM | DIASTOLIC BLOOD PRESSURE: 92 MMHG

## 2023-03-06 DIAGNOSIS — M54.42 ACUTE LEFT-SIDED LOW BACK PAIN WITH LEFT-SIDED SCIATICA: Primary | ICD-10-CM

## 2023-03-06 PROCEDURE — 99213 OFFICE O/P EST LOW 20 MIN: CPT

## 2023-03-06 RX ORDER — METHOCARBAMOL 500 MG/1
500 TABLET, FILM COATED ORAL 2 TIMES DAILY PRN
Qty: 14 TABLET | Refills: 0 | Status: SHIPPED | OUTPATIENT
Start: 2023-03-06 | End: 2023-04-26

## 2023-03-06 NOTE — ASSESSMENT & PLAN NOTE
Differentials considered today include left-sided SI joint dysfunction versus lumbar spine dysfunction.  Patient does have a known herniated disc.  Order placed for physical therapy.  We discussed attempting to manage conservatively over the next couple weeks.  Patient's diabetes is relatively uncontrolled with a hemoglobin A1c of 10 last checked; with we discussed avoiding oral steroids due to this which patient is in agreement with.  Last kidney function was normal, but we did discuss limiting the use of NSAIDs as able.  Rotate with Tylenol.  We will trial a very low-dose muscle relaxer at night to due to patient's report of spasming pain.  Discussed side effects including drowsiness and fatigue in addition to the risk of falls.  We also discussed the use of Voltaren gel versus lidocaine patches for symptom control.  In 4 to 6 weeks if patient is not having relief of symptoms, we discussed to further imaging such as an MRI versus referral to orthospine.  Could also trial gabapentin.  Discussed reasons to seek care including worsening symptoms, persistent symptoms over 4 to 6 weeks, or red flag symptoms.  Patient and wife expressed understanding of and agreement this plan.  All questions were answered.

## 2023-03-06 NOTE — PROGRESS NOTES
Problem List Items Addressed This Visit        Nervous and Auditory    Acute left-sided low back pain with left-sided sciatica - Primary     Differentials considered today include left-sided SI joint dysfunction versus lumbar spine dysfunction.  Patient does have a known herniated disc.  Order placed for physical therapy.  We discussed attempting to manage conservatively over the next couple weeks.  Patient's diabetes is relatively uncontrolled with a hemoglobin A1c of 10 last checked; with we discussed avoiding oral steroids due to this which patient is in agreement with.  Last kidney function was normal, but we did discuss limiting the use of NSAIDs as able.  Rotate with Tylenol.  We will trial a very low-dose muscle relaxer at night to due to patient's report of spasming pain.  Discussed side effects including drowsiness and fatigue in addition to the risk of falls.  We also discussed the use of Voltaren gel versus lidocaine patches for symptom control.  In 4 to 6 weeks if patient is not having relief of symptoms, we discussed to further imaging such as an MRI versus referral to orthospine.  Could also trial gabapentin.  Discussed reasons to seek care including worsening symptoms, persistent symptoms over 4 to 6 weeks, or red flag symptoms.  Patient and wife expressed understanding of and agreement this plan.  All questions were answered.          Of note, patient had elevation in his BP today in clinic. He left clinic before we were able to recheck; rooming staff will help him set up a clinic BP check as chart review shows multiple elevations.    IBIS Early CNP on 3/6/2023 at 9:18 AM       Nevin   Chet is a 72 year old who presents for the following health issues     Chief Complaint   Patient presents with     Musculoskeletal Problem     Left hip and leg 3 week.       -Present about 3 weeks  -Denies an injury; just woke up with it  -No history of similar pain  -Squeezing pain. Shoots down  "the outside of his leg.  -Aggravating factors include walking  -Alleviating factors include rest. Tylenol and ibuprofen will help a little bit.   -No preceeding events or changes in habits    Musculoskeletal Problem    History of Present Illness       Reason for visit:  Leg pain  Symptom onset:  1-2 weeks ago  Symptoms include:  Left pain in hip all the way down to ankle  Symptom intensity:  Moderate  Symptom progression:  Worsening  Had these symptoms before:  No  What makes it worse:  No  What makes it better:  No    He eats 2-3 servings of fruits and vegetables daily.He consumes 1 sweetened beverage(s) daily.He exercises with enough effort to increase his heart rate 9 or less minutes per day.  He exercises with enough effort to increase his heart rate 3 or less days per week.   He is taking medications regularly.       Review of Systems         Objective    BP (!) 168/92 (BP Location: Left arm, Patient Position: Sitting, Cuff Size: Adult Large)   Pulse 95   Temp 98.6  F (37  C) (Oral)   Resp 16   Ht 1.753 m (5' 9\")   Wt 106.9 kg (235 lb 11.2 oz)   SpO2 97%   BMI 34.81 kg/m    Body mass index is 34.81 kg/m .     Physical Exam  Vitals and nursing note reviewed.   Constitutional:       General: He is not in acute distress.     Appearance: Normal appearance. He is not ill-appearing.   Musculoskeletal:      Cervical back: Normal.      Thoracic back: Normal.      Lumbar back: No swelling, tenderness or bony tenderness. Normal range of motion. Positive left straight leg raise test. Negative right straight leg raise test.      Comments: Mildly positive RENEE bilaterally   Neurological:      Mental Status: He is alert.            This note has been dictated using voice recognition software. Any grammatical or context distortions are unintentional and inherent to the software      "

## 2023-03-10 ENCOUNTER — MYC MEDICAL ADVICE (OUTPATIENT)
Dept: FAMILY MEDICINE | Facility: CLINIC | Age: 73
End: 2023-03-10
Payer: COMMERCIAL

## 2023-03-13 ENCOUNTER — OFFICE VISIT (OUTPATIENT)
Dept: FAMILY MEDICINE | Facility: CLINIC | Age: 73
End: 2023-03-13
Payer: COMMERCIAL

## 2023-03-13 VITALS
WEIGHT: 231 LBS | HEIGHT: 69 IN | BODY MASS INDEX: 34.21 KG/M2 | DIASTOLIC BLOOD PRESSURE: 84 MMHG | OXYGEN SATURATION: 97 % | HEART RATE: 85 BPM | TEMPERATURE: 97.8 F | SYSTOLIC BLOOD PRESSURE: 163 MMHG | RESPIRATION RATE: 14 BRPM

## 2023-03-13 DIAGNOSIS — M79.2 NEUROPATHIC PAIN, LEG, LEFT: ICD-10-CM

## 2023-03-13 DIAGNOSIS — M54.42 ACUTE LEFT-SIDED LOW BACK PAIN WITH LEFT-SIDED SCIATICA: ICD-10-CM

## 2023-03-13 DIAGNOSIS — I10 ESSENTIAL HYPERTENSION: Primary | ICD-10-CM

## 2023-03-13 PROCEDURE — 99214 OFFICE O/P EST MOD 30 MIN: CPT

## 2023-03-13 RX ORDER — LISINOPRIL 40 MG/1
20 TABLET ORAL DAILY
Qty: 90 TABLET | Refills: 0 | Status: SHIPPED | OUTPATIENT
Start: 2023-03-13 | End: 2023-03-14

## 2023-03-13 RX ORDER — GABAPENTIN 100 MG/1
100 CAPSULE ORAL 3 TIMES DAILY
Qty: 90 CAPSULE | Refills: 1 | Status: SHIPPED | OUTPATIENT
Start: 2023-03-13 | End: 2023-04-26

## 2023-03-13 NOTE — ASSESSMENT & PLAN NOTE
Uncontrolled.  Will increase his lisinopril to 40 mg daily.  Discussed expected therapeutic effects and potential side effects.  Also currently taking 10 mg amlodipine.  Plan to check BMP and blood pressure in approximately 4 weeks and evaluate for response.

## 2023-03-13 NOTE — ASSESSMENT & PLAN NOTE
Muscle relaxers intermittently effective per patient report.  Discussed he can continue them if he is finding benefit.  He has not to been seen by physical therapy as of yet, due to wait times.  We discussed conservative management to, as we did the last visit, with physical therapy and medication management.  We will initiate gabapentin, with a goal dosing of 100 mg 3 times daily.  Discussed titration up.  Discussed expected therapeutic effects and potential side effects.  Reviewed last kidney function done 6 months ago, which was normal.  I have also placed a referral to orthospine, as patient is interested in injections.

## 2023-03-13 NOTE — PATIENT INSTRUCTIONS
Increase your lisinopril to 40 mg daily.  I sent in a new prescription to your pharmacy.  We will do blood work tomorrow at your exam, but I would want repeat blood work in 4 weeks.  Please schedule a nurse blood pressure check at this time as well.  Start taking gabapentin for your leg pain.  Our goal will be 100 mg 3 times daily.  Start with 1 pill at night.  Do this for approximately 1 week.  Then add on a pill in the morning.  Again, do this for a week.  Finally, add on your third pill in the middle of the day.  If you are tolerating this medication well with no side effects, you can increase her dose quicker than this.  I have also placed a referral to the spine specialist to evaluate for potential injections.  They will want you to have completed a number of physical therapy sessions, in addition to the medication management we have done, so I would recommend scheduling appoint with them after you are able to do all this.

## 2023-03-13 NOTE — PROGRESS NOTES
Problem List Items Addressed This Visit        Nervous and Auditory    Acute left-sided low back pain with left-sided sciatica    Relevant Medications    gabapentin (NEURONTIN) 100 MG capsule    Neuropathic pain, leg, left     Muscle relaxers intermittently effective per patient report.  Discussed he can continue them if he is finding benefit.  He has not to been seen by physical therapy as of yet, due to wait times.  We discussed conservative management to, as we did the last visit, with physical therapy and medication management.  We will initiate gabapentin, with a goal dosing of 100 mg 3 times daily.  Discussed titration up.  Discussed expected therapeutic effects and potential side effects.  Reviewed last kidney function done 6 months ago, which was normal.  I have also placed a referral to orthospine, as patient is interested in injections.         Relevant Medications    gabapentin (NEURONTIN) 100 MG capsule    Other Relevant Orders    Spine  Referral       Circulatory    Hypertension - Primary     Uncontrolled.  Will increase his lisinopril to 40 mg daily.  Discussed expected therapeutic effects and potential side effects.  Also currently taking 10 mg amlodipine.  Plan to check BMP and blood pressure in approximately 4 weeks and evaluate for response.         Relevant Medications    lisinopril (ZESTRIL) 40 MG tablet    Other Relevant Orders    Basic metabolic panel  (Ca, Cl, CO2, Creat, Gluc, K, Na, BUN)      IBIS Early CNP on 3/13/2023 at 8:30 AM      Subjective   Chet is a 72 year old who presents for the following health issues  accompanied by his spouse  Chief Complaint   Patient presents with     Hypertension     Elevated at home     left leg     From hip to foot      Patient is being seen in follow-up for leg pain and blood pressure.  He was seen last week for acute left-sided low back pain with radicular symptoms.  At that time, I placed order for physical therapy and started  "him on a low-dose muscle relaxant.  Muscle relaxant is intermittently effective.  He has not been seen by physical therapy yet.  Symptoms are about the same.  Denies any red flag symptoms today.  Is interested in spinal injections for pain management.  Is able to complete activities of daily living, but pain worsens with any sort of movement.    Blood pressure was elevated last week.  Remains elevated today.       Review of Systems         Objective    BP (!) 163/84 (BP Location: Left arm, Patient Position: Sitting, Cuff Size: Adult Large)   Pulse 85   Temp 97.8  F (36.6  C) (Oral)   Resp 14   Ht 1.753 m (5' 9\")   Wt 104.8 kg (231 lb)   SpO2 97%   BMI 34.11 kg/m    Body mass index is 34.11 kg/m .  Physical Exam  Vitals and nursing note reviewed.   Constitutional:       General: He is not in acute distress.     Appearance: Normal appearance. He is not ill-appearing.      Comments: Appears uncomfortable   Cardiovascular:      Rate and Rhythm: Normal rate and regular rhythm.   Pulmonary:      Effort: Pulmonary effort is normal. No respiratory distress.   Musculoskeletal:      Comments: Comprehensive exam not completed today, as patient was just seen last week and there are no changes to his symptoms.   Neurological:      Mental Status: He is alert.   Psychiatric:         Mood and Affect: Mood normal.         Behavior: Behavior normal.         Thought Content: Thought content normal.          This note has been dictated using voice recognition software. Any grammatical or context distortions are unintentional and inherent to the software      "

## 2023-03-14 ENCOUNTER — OFFICE VISIT (OUTPATIENT)
Dept: FAMILY MEDICINE | Facility: CLINIC | Age: 73
End: 2023-03-14
Payer: COMMERCIAL

## 2023-03-14 VITALS
HEIGHT: 69 IN | HEART RATE: 84 BPM | DIASTOLIC BLOOD PRESSURE: 83 MMHG | TEMPERATURE: 97.9 F | WEIGHT: 231.56 LBS | SYSTOLIC BLOOD PRESSURE: 151 MMHG | RESPIRATION RATE: 16 BRPM | BODY MASS INDEX: 34.3 KG/M2 | OXYGEN SATURATION: 98 %

## 2023-03-14 DIAGNOSIS — N40.1 BENIGN PROSTATIC HYPERPLASIA WITH LOWER URINARY TRACT SYMPTOMS, SYMPTOM DETAILS UNSPECIFIED: ICD-10-CM

## 2023-03-14 DIAGNOSIS — Z00.00 ENCOUNTER FOR MEDICARE ANNUAL WELLNESS EXAM: Primary | ICD-10-CM

## 2023-03-14 DIAGNOSIS — E66.811 CLASS 1 OBESITY DUE TO EXCESS CALORIES WITH SERIOUS COMORBIDITY AND BODY MASS INDEX (BMI) OF 33.0 TO 33.9 IN ADULT: ICD-10-CM

## 2023-03-14 DIAGNOSIS — E66.09 CLASS 1 OBESITY DUE TO EXCESS CALORIES WITH SERIOUS COMORBIDITY AND BODY MASS INDEX (BMI) OF 33.0 TO 33.9 IN ADULT: ICD-10-CM

## 2023-03-14 DIAGNOSIS — M54.42 ACUTE LEFT-SIDED LOW BACK PAIN WITH LEFT-SIDED SCIATICA: ICD-10-CM

## 2023-03-14 DIAGNOSIS — E78.00 HYPERCHOLESTEROLEMIA: ICD-10-CM

## 2023-03-14 DIAGNOSIS — D50.9 MICROCYTIC ANEMIA: ICD-10-CM

## 2023-03-14 DIAGNOSIS — E11.9 TYPE 2 DIABETES MELLITUS WITHOUT COMPLICATION, UNSPECIFIED WHETHER LONG TERM INSULIN USE (H): ICD-10-CM

## 2023-03-14 DIAGNOSIS — I10 ESSENTIAL HYPERTENSION: ICD-10-CM

## 2023-03-14 DIAGNOSIS — C18.9 MALIGNANT NEOPLASM OF COLON, UNSPECIFIED PART OF COLON (H): ICD-10-CM

## 2023-03-14 LAB
ALBUMIN SERPL BCG-MCNC: 4.6 G/DL (ref 3.5–5.2)
ALP SERPL-CCNC: 81 U/L (ref 40–129)
ALT SERPL W P-5'-P-CCNC: 27 U/L (ref 10–50)
ANION GAP SERPL CALCULATED.3IONS-SCNC: 14 MMOL/L (ref 7–15)
AST SERPL W P-5'-P-CCNC: 22 U/L (ref 10–50)
BILIRUB SERPL-MCNC: 0.8 MG/DL
BUN SERPL-MCNC: 13.1 MG/DL (ref 8–23)
CALCIUM SERPL-MCNC: 9.6 MG/DL (ref 8.8–10.2)
CHLORIDE SERPL-SCNC: 101 MMOL/L (ref 98–107)
CHOLEST SERPL-MCNC: 128 MG/DL
CREAT SERPL-MCNC: 0.55 MG/DL (ref 0.67–1.17)
CREAT UR-MCNC: 36.4 MG/DL
DEPRECATED HCO3 PLAS-SCNC: 25 MMOL/L (ref 22–29)
ERYTHROCYTE [DISTWIDTH] IN BLOOD BY AUTOMATED COUNT: 12.5 % (ref 10–15)
GFR SERPL CREATININE-BSD FRML MDRD: >90 ML/MIN/1.73M2
GLUCOSE SERPL-MCNC: 237 MG/DL (ref 70–99)
HCT VFR BLD AUTO: 44.5 % (ref 40–53)
HDLC SERPL-MCNC: 58 MG/DL
HGB BLD-MCNC: 15.4 G/DL (ref 13.3–17.7)
LDLC SERPL CALC-MCNC: 55 MG/DL
MCH RBC QN AUTO: 31.4 PG (ref 26.5–33)
MCHC RBC AUTO-ENTMCNC: 34.6 G/DL (ref 31.5–36.5)
MCV RBC AUTO: 91 FL (ref 78–100)
MICROALBUMIN UR-MCNC: 98.3 MG/L
MICROALBUMIN/CREAT UR: 270.05 MG/G CR (ref 0–17)
NONHDLC SERPL-MCNC: 70 MG/DL
PLATELET # BLD AUTO: 188 10E3/UL (ref 150–450)
POTASSIUM SERPL-SCNC: 4.4 MMOL/L (ref 3.4–5.3)
PROT SERPL-MCNC: 7.3 G/DL (ref 6.4–8.3)
RBC # BLD AUTO: 4.91 10E6/UL (ref 4.4–5.9)
SODIUM SERPL-SCNC: 140 MMOL/L (ref 136–145)
TRIGL SERPL-MCNC: 76 MG/DL
WBC # BLD AUTO: 6.4 10E3/UL (ref 4–11)

## 2023-03-14 PROCEDURE — 80053 COMPREHEN METABOLIC PANEL: CPT

## 2023-03-14 PROCEDURE — 36415 COLL VENOUS BLD VENIPUNCTURE: CPT

## 2023-03-14 PROCEDURE — G0439 PPPS, SUBSEQ VISIT: HCPCS

## 2023-03-14 PROCEDURE — 82570 ASSAY OF URINE CREATININE: CPT

## 2023-03-14 PROCEDURE — 82043 UR ALBUMIN QUANTITATIVE: CPT

## 2023-03-14 PROCEDURE — 99207 PR FOOT EXAM NO CHARGE: CPT

## 2023-03-14 PROCEDURE — 80061 LIPID PANEL: CPT

## 2023-03-14 PROCEDURE — 85027 COMPLETE CBC AUTOMATED: CPT

## 2023-03-14 RX ORDER — LISINOPRIL 40 MG/1
40 TABLET ORAL DAILY
Qty: 90 TABLET | Refills: 0 | Status: SHIPPED | OUTPATIENT
Start: 2023-03-14 | End: 2023-06-12

## 2023-03-14 ASSESSMENT — ENCOUNTER SYMPTOMS
PALPITATIONS: 0
HEMATURIA: 0
NAUSEA: 0
PARESTHESIAS: 0
SORE THROAT: 0
ARTHRALGIAS: 0
ABDOMINAL PAIN: 0
HEARTBURN: 0
FEVER: 0
FREQUENCY: 0
HEADACHES: 0
DYSURIA: 0
COUGH: 0
HEMATOCHEZIA: 0
DIZZINESS: 0
WEAKNESS: 0
DIARRHEA: 0
MYALGIAS: 0
EYE PAIN: 0
CHILLS: 0
CONSTIPATION: 0
JOINT SWELLING: 0
SHORTNESS OF BREATH: 0
NERVOUS/ANXIOUS: 0

## 2023-03-14 ASSESSMENT — ACTIVITIES OF DAILY LIVING (ADL): CURRENT_FUNCTION: NO ASSISTANCE NEEDED

## 2023-03-14 NOTE — ASSESSMENT & PLAN NOTE
Annual exam.  Chronic conditions are stable, with the exception of documentation made elsewhere.  Working to improve blood pressure and diabetes.  Updated health maintenance.  Declines lung cancer screening.  Next colonoscopy 2025.  Updated labs as documented.

## 2023-03-14 NOTE — ASSESSMENT & PLAN NOTE
See visit note from 3/13 for additional details.  Patient did start gabapentin last night; reports he tolerated well with no side effects.  We will plan to increase it to a goal dosing of 3 times daily as outlined previously.  Has planned follow-up with physical therapy.  If conservative management with medications and physical therapy fail, patient does have an active referral to orthospine for potential injections.

## 2023-03-14 NOTE — ASSESSMENT & PLAN NOTE
We will recheck CBC today, the patient denies any symptoms.  Has not been supplementing with iron.

## 2023-03-14 NOTE — ASSESSMENT & PLAN NOTE
Last assessed 2 months ago with patient's primary care physician.  Showed worsening of hemoglobin A1c up to 10.3.  At that time, she increased his Trulicity dose and referred him to endocrinology.  Patient has not been seen by endocrine, and did not know that a referral was placed.  I provided him with the number to schedule with them.  Foot exam completed and normal today.  Microalbumin obtained.  Patient did have an eye exam, which he reports was normal, and we have requested these records.  Discussed diet as documented elsewhere.  No adjustments today; will assist patient in scheduling follow-up with his PCP for the month of April to reassess.

## 2023-03-14 NOTE — PATIENT INSTRUCTIONS
I will follow up with you on labs drawn today  You will need to follow up with Dr. Charles at some point in the month of April. At this time, you will need a repeat hemoglobin A1C, a recheck of your kidney function/electrolytes, and a check on your blood pressure. You can also discuss follow up on your pain at that time.  Call to schedule an appointment with endocrinology to discuss your diabetes. [phone].  As your pain improves, I encourage you to work on your exercise as we talked about. Try to decrease salt intake and work on decreasing the amount of times you are eating out per week.     Patient Education   Personalized Prevention Plan  You are due for the preventive services outlined below.  Your care team is available to assist you in scheduling these services.  If you have already completed any of these items, please share that information with your care team to update in your medical record.  Health Maintenance Due   Topic Date Due    Eye Exam  Never done    LUNG CANCER SCREENING  05/23/2012    Cholesterol Lab  06/11/2022    Kidney Microalbumin Urine Test  02/09/2023       Exercise for a Healthier Heart  You may wonder how you can improve the health of your heart. If you re thinking about exercise, you re on the right track. You don t need to become an athlete. But you do need a certain amount of brisk exercise to help strengthen your heart. If you have been diagnosed with a heart condition, your healthcare provider may advise exercise to help stabilize your condition. To help make exercise a habit, choose safe, fun activities.      Exercise with a friend. When activity is fun, you're more likely to stick with it.   Before you start  Check with your healthcare provider before starting an exercise program. This is especially important if you have not been active for a while. It's also important if you have a long-term (chronic) health problem such as heart disease, diabetes, or obesity. Or if you are at  high risk for having these problems.   Why exercise?  Exercising regularly offers many healthy rewards. It can help you do all of the following:   Improve your blood cholesterol level to help prevent further heart trouble  Lower your blood pressure to help prevent a stroke or heart attack  Control diabetes, or reduce your risk of getting this disease  Improve your heart and lung function  Reach and stay at a healthy weight  Make your muscles stronger so you can stay active  Prevent falls and fractures by slowing the loss of bone mass (osteoporosis)  Manage stress better  Reduce your blood pressure  Improve your sense of self and your body image  Exercise tips    Ease into your routine. Set small goals. Then build on them. If you are not sure what your activity level should be, talk with your healthcare provider first before starting an exercise routine.  Exercise on most days. Aim for a total of 150 minutes (2 hours and 30 minutes) or more of moderate-intensity aerobic activity each week. Or 75 minutes (1 hour and 15 minutes) or more of vigorous-intensity aerobic activity each week. Or try for a combination of both. Moderate activity means that you breathe heavier and your heart rate increases but you can still talk. Think about doing 40 minutes of moderate exercise, 3 to 4 times a week. For best results, activity should last for about 40 minutes to lower blood pressure and cholesterol. It's OK to work up to the 40-minute period over time. Examples of moderate-intensity activity are walking 1 mile in 15 minutes. Or doing 30 to 45 minutes of yard work.  Step up your daily activity level.  Along with your exercise program, try being more active the whole day. Walk instead of drive. Or park further away so that you take more steps each day. Do more household tasks or yard work. You may not be able to meet the advised mount of physical activity. But doing some moderate- or vigorous-intensity aerobic activity can help  reduce your risk for heart disease. Your healthcare provider can help you figure out what is best for you.  Choose 1 or more activities you enjoy.  Walking is one of the easiest things you can do. You can also try swimming, riding a bike, dancing, or taking an exercise class.    When to call your healthcare provider  Call your healthcare provider if you have any of these:   Chest pain or feel dizzy or lightheaded  Burning, tightness, pressure, or heaviness in your chest, neck, shoulders, back, or arms  Abnormal shortness of breath  More joint or muscle pain  A very fast or irregular heartbeat (palpitations)  [employer] last reviewed this educational content on 7/1/2019 2000-2021 [employer]. All rights reserved. This information is not intended as a substitute for professional medical care. Always follow your healthcare professional's instructions.          Understanding USDA MyPlate  The USDA has guidelines to help you make healthy food choices. These are called MyPlate. MyPlate shows the food groups that make up healthy meals using the image of a place setting. Before you eat, think about the healthiest choices for what to put on your plate or in your cup or bowl. To learn more about building a healthy plate, visit www.choosemyplate.gov.    The food groups  Fruits. Any fruit or 100% fruit juice counts as part of the Fruit Group. Fruits may be fresh, canned, frozen, or dried, and may be whole, cut-up, or pureed. Make 1/2 of your plate fruits and vegetables.  Vegetables. Any vegetable or 100% vegetable juice counts as a member of the Vegetable Group. Vegetables may be fresh, frozen, canned, or dried. They can be served raw or cooked and may be whole, cut-up, or mashed. Make 1/2 of your plate fruits and vegetables.  Grains. All foods made from grains are part of the Grains Group. These include wheat, rice, oats, cornmeal, and barley. Grains are often used to make foods such as bread, pasta, oatmeal,  cereal, tortillas, and grits. Grains should be no more than 1/4 of your plate. At least half of your grains should be whole grains.  Protein. This group includes meat, poultry, seafood, beans and peas, eggs, processed soy products (such as tofu), nuts (including nut butters), and seeds. Make protein choices no more than 1/4 of your plate. Meat and poultry choices should be lean or low fat.  Dairy. The Dairy Group includes all fluid milk products and foods made from milk that contain calcium, such as yogurt and cheese. (Foods that have little calcium, such as cream, butter, and cream cheese, are not part of this group.) Most dairy choices should be low-fat or fat-free.  Oils. Oils aren't a food group, but they do contain essential nutrients. However it's important to watch your intake of oils. These are fats that are liquid at room temperature. They include canola, corn, olive, soybean, vegetable, and sunflower oil. Foods that are mainly oil include mayonnaise, certain salad dressings, and soft margarines. You likely already get your daily oil allowance from the foods you eat.  Things to limit  Eating healthy also means limiting these things in your diet:     Salt (sodium). Many processed foods have a lot of sodium. To keep sodium intake down, eat fresh vegetables, meats, poultry, and seafood when possible. Purchase low-sodium, reduced-sodium, or no-salt-added food products at the store. And don't add salt to your meals at home. Instead, season them with herbs and spices such as dill, oregano, cumin, and paprika. Or try adding flavor with lemon or lime zest and juice.  Saturated fat. Saturated fats are most often found in animal products such as beef, pork, and chicken. They are often solid at room temperature, such as butter. To reduce your saturated fat intake, choose leaner cuts of meat and poultry. And try healthier cooking methods such as grilling, broiling, roasting, or baking. For a simple lower-fat swap, use  plain nonfat yogurt instead of mayonnaise when making potato salad or macaroni salad.  Added sugars. These are sugars added to foods. They are in foods such as ice cream, candy, soda, fruit drinks, sports drinks, energy drinks, cookies, pastries, jams, and syrups. Cut down on added sugars by sharing sweet treats with a family member or friend. You can also choose fruit for dessert, and drink water or other unsweetened beverages.     [employer] last reviewed this educational content on 6/1/2020 2000-2021 [employer]. All rights reserved. This information is not intended as a substitute for professional medical care. Always follow your healthcare professional's instructions.          Signs of Hearing Loss      Hearing much better with one ear can be a sign of hearing loss.   Hearing loss is a problem shared by many people. In fact, it is one of the most common health problems, particularly as people age. Most people age 65 and older have some hearing loss. By age 80, almost everyone does. Hearing loss often occurs slowly over the years. So you may not realize your hearing has gotten worse.  Have your hearing checked  Call your healthcare provider if you:  Have to strain to hear normal conversation  Have to watch other people s faces very carefully to follow what they re saying  Need to ask people to repeat what they ve said  Often misunderstand what people are saying  Turn the volume of the television or radio up so high that others complain  Feel that people are mumbling when they re talking to you  Find that the effort to hear leaves you feeling tired and irritated  Notice, when using the phone, that you hear better with one ear than the other  [employer] last reviewed this educational content on 1/1/2020 2000-2021 [employer]. All rights reserved. This information is not intended as a substitute for professional medical care. Always follow your healthcare professional's  instructions.

## 2023-03-14 NOTE — PROGRESS NOTES
"SUBJECTIVE:   Chet is a 72 year old who presents for Preventive Visit.    Patient has been advised of split billing requirements and indicates understanding: Yes     Are you in the first 12 months of your Medicare coverage?  No    Healthy Habits:     In general, how would you rate your overall health?  Good    Frequency of exercise:  1 day/week    Duration of exercise:  Less than 15 minutes    Do you usually eat at least 4 servings of fruit and vegetables a day, include whole grains    & fiber and avoid regularly eating high fat or \"junk\" foods?  No    Taking medications regularly:  Yes    Medication side effects:  None    Ability to successfully perform activities of daily living:  No assistance needed    Home Safety:  No safety concerns identified    Hearing Impairment:  Need to ask people to speak up or repeat themselves    In the past 6 months, have you been bothered by leaking of urine?  No    In general, how would you rate your overall mental or emotional health?  Good      PHQ-2 Total Score: 0    Additional concerns today:  No    Have you ever done Advance Care Planning? (For example, a Health Directive, POLST, or a discussion with a medical provider or your loved ones about your wishes): No, advance care planning information given to patient to review.  Advanced care planning was discussed at today's visit.     Fall risk  Fallen 2 or more times in the past year?: No  Any fall with injury in the past year?: No    Cognitive Screening   1) Repeat 3 items (Leader, Season, Table)  PASS  2) Clock draw: NORMAL  3) 3 item recall: Recalls 1 object   Results: ABNORMAL clock, 1-2 items recalled: PROBABLE COGNITIVE IMPAIRMENT, **INFORM PROVIDER**    Mini-CogTM Luis Herrera. Licensed by the author for use in [facility]; reprinted with permission (wilver@.[facility]). All rights reserved.      Do you have sleep apnea, excessive snoring or daytime drowsiness?: no    Reviewed and updated as needed this visit by " clinical staff   Tobacco  Allergies  Meds              Reviewed and updated as needed this visit by Provider                 Social History     Tobacco Use     Smoking status: Former     Smokeless tobacco: Never   Substance Use Topics     Alcohol use: Yes     Alcohol/week: 1.0 standard drink     Types: 1 Glasses of wine per week     Comment: VERY LITTLE       Alcohol Use 3/14/2023   Prescreen: >3 drinks/day or >7 drinks/week? No     Current providers sharing in care for this patient include:   Patient Care Team:  Priti Charles MD as PCP - General  Priti Charles MD as Assigned PCP  Ana Dorsey MD as Haritha Colon, PharmD as MTM Pharamcist (Pharmacist)    The following health maintenance items are reviewed in Epic and correct as of today:  Health Maintenance   Topic Date Due     EYE EXAM  Never done     LUNG CANCER SCREENING  05/23/2012     MEDICARE ANNUAL WELLNESS VISIT  11/27/2020     LIPID  06/11/2022     MICROALBUMIN  02/09/2023     A1C  07/03/2023     BMP  08/11/2023     DIABETIC FOOT EXAM  01/03/2024     ANNUAL REVIEW OF HM ORDERS  01/25/2024     FALL RISK ASSESSMENT  03/14/2024     ADVANCE CARE PLANNING  11/27/2024     COLORECTAL CANCER SCREENING  11/03/2032     DTAP/TDAP/TD IMMUNIZATION (4 - Td or Tdap) 01/03/2033     HEPATITIS C SCREENING  Completed     PHQ-2 (once per calendar year)  Completed     INFLUENZA VACCINE  Completed     Pneumococcal Vaccine: 65+ Years  Completed     ZOSTER IMMUNIZATION  Completed     AORTIC ANEURYSM SCREENING (SYSTEM ASSIGNED)  Completed     COVID-19 Vaccine  Completed     IPV IMMUNIZATION  Aged Out     MENINGITIS IMMUNIZATION  Aged Out     Lab work is in process  Labs reviewed in EPIC  BP Readings from Last 3 Encounters:   03/14/23 (!) 151/83   03/13/23 (!) 163/84   03/06/23 (!) 168/92    Wt Readings from Last 3 Encounters:   03/14/23 105 kg (231 lb 9 oz)   03/13/23 104.8 kg (231 lb)   03/06/23 106.9 kg (235 lb 11.2 oz)                  Patient Active  Problem List   Diagnosis     Hypercholesterolemia     Hypertension     Calculus of kidney and ureter     Type 2 diabetes mellitus without complication (H)     History of primary malignant neoplasm of kidney     Vitamin D Deficiency     Preventative health care     Wears hearing aid - bilateral     Pain in both hands     Cognitive deficits     Microcytic anemia     Malignant neoplasm of ascending colon (H)     Colon cancer (H)     Diverticular disease of colon     Bilateral impacted cerumen     Benign prostatic hyperplasia with lower urinary tract symptoms, symptom details unspecified     Class 1 obesity due to excess calories with body mass index (BMI) of 33.0 to 33.9 in adult     RLQ abdominal mass     Acute left-sided low back pain with left-sided sciatica     Neuropathic pain, leg, left     Encounter for Medicare annual wellness exam     Past Surgical History:   Procedure Laterality Date     COLECTOMY RIGHT N/A 7/30/2021    Procedure: OPEN RIGHT COLECTOMY;  Surgeon: Ana Dorsey MD;  [place] [facility] [place]     COMBINED CYSTOSCOPY, INSERT STENT URETER(S) Right 7/30/2021    Procedure: CYSTOSCOPY, RIGHT STENT AND URETERAL CATHETERIZATION;  Surgeon: Wyatt Mcnulty MD;  [place] [facility] [place]     COMBINED CYSTOSCOPY, RETROGRADES, URETEROSCOPY, LASER HOLMIUM LITHOTRIPSY URETER(S), INSERT STENT Bilateral 8/30/2022    Procedure: CYSTOSCOPY, RIGHT  RETROGRADE PYELOGRAM, RIGHT URETEROSCOPY, RIGHT URETERAL STENT EXCHANGE, LEFT URETERAL STENT REMOVAL;  Surgeon: Jason Zuniga MD;  [place] [facility] [place]     CYSTOURETEROSCOPY, WITH LITHOTRIPSY USING WENDY 120P LASER AND URETERAL STENT INSERTION Bilateral 8/16/2022    Procedure: CYSTOSCOPY, LEFT URETEROSCOPY, LASER LITHOTRIPSY ON LEFT,  LEFT RETROGRADE PYELOGRAM, LEFT URETERAL STENT PLACEMENT, RIGHT URETEROSCOPY, RIGHT RETROGRADE PYELOGRAM,  RIGHT URETERAL STENT PLACEMENT;  Surgeon: Jason Zuniga MD;  [place] [facility] [place]     HC REMOVAL  GALLBLADDER      Description: Cholecystectomy;  Recorded: 05/05/2008;     IR LUMBAR EPIDURAL STEROID INJECTION  11/6/2008     LAPAROSCOPIC ASSISTED COLECTOMY N/A 7/30/2021    Procedure: LAPAROSCOPIC;  Surgeon: Ana Dorsey MD;  Location: [facility] [place]     [facility] REMV KIDNEY,W/RIB RESECTION      Description: Nephrectomy;  Recorded: 09/12/2011;  Comments: RIGHT PARTIAL NEPHRECTOMY, 11/2010     [facility]MBIL[facility] RIT[facility],<4Y/O,REDUC      Description: Umbilical Hernia Repair;  Recorded: 05/05/2008;       Social History     Tobacco Use     Smoking status: Former     Smokeless tobacco: Never   Substance Use Topics     Alcohol use: Yes     Alcohol/week: 1.0 standard drink     Types: 1 Glasses of wine per week     Comment: VERY LITTLE     No family history on file.      Current Outpatient Medications   Medication Sig Dispense Refill     amLODIPine (NORVASC) 10 MG tablet TAKE 1 TABLET(10 MG) BY MOUTH DAILY AS DIRECTED 90 tablet 3     aspirin (ASA) 325 MG EC tablet Take 325 mg by mouth daily       atorvastatin (LIPITOR) 80 MG tablet Take 1 tablet (80 mg) by mouth daily 90 tablet 1     calcium carbonate (OS-EDER) 500 MG tablet Take 1 tablet by mouth daily       CRANBERRY EXTRACT (CRANBERRY ORAL) [CRANBERRY EXTRACT (CRANBERRY ORAL)] Take by mouth.       cyanocobalamin (VITAMIN B-12) 100 MCG tablet Take 100 mcg by mouth daily       Dulaglutide (TRULICITY) 4.5 MG/0.5ML SOPN Inject 4.5 mg Subcutaneous once a week 6 mL 0     gabapentin (NEURONTIN) 100 MG capsule Take 1 capsule (100 mg) by mouth 3 times daily for 30 days 90 capsule 1     glipiZIDE (GLUCOTROL XL) 10 MG 24 hr tablet TAKE 1 TABLET(10 MG) BY MOUTH DAILY 90 tablet 0     lisinopril (ZESTRIL) 40 MG tablet Take 1 tablet (40 mg) by mouth daily for 90 days 90 tablet 0     metFORMIN (GLUCOPHAGE) 500 MG tablet TAKE 2 TABLETS(1000 MG) BY MOUTH TWICE DAILY WITH MEALS 360 tablet 1     methocarbamol (ROBAXIN) 500 MG tablet Take 1 tablet (500 mg) by mouth 2 times daily as needed  for muscle spasms 14 tablet 0     MULTIVITAMIN ORAL Take 1 tablet by mouth daily        ONETOUCH VERIO IQ test strip USE AND DISCARD 1 TEST     STRIP 3 TIMES DAILY AS     DIRECTED. 300 strip 3     tamsulosin (FLOMAX) 0.4 MG capsule TAKE 1 CAPSULE(0.4 MG) BY MOUTH DAILY 90 capsule 2     Vitamin D3 (CHOLECALCIFEROL) 125 MCG (5000 UT) tablet Take 125 mcg by mouth daily       Allergies   Allergen Reactions     Morphine Nausea and Vomiting     Recent Labs   Lab Test 01/03/23  1115 08/11/22  0807 05/28/22  1734 05/25/22  1539 02/09/22  0759 08/02/21  0712 07/31/21  0758 07/21/21  1517 06/11/21  0659 06/04/21  1219 12/18/20  1421 12/18/20  1357 03/07/20  1146 11/27/19  1537 11/28/18  1125 07/17/18  1125   A1C 10.3*  --   --  8.7* 7.1*  --   --   --   --    < >  --    < >  --   --    < >  --    LDL  --   --   --   --   --   --   --   --  49  --   --   --   --  53  --  67   HDL  --   --   --   --   --   --   --   --  44  --   --   --   --  47  --  45   TRIG  --   --   --   --   --   --   --   --  40  --   --   --   --  46  --  60   ALT  --   --   --   --   --   --   --  <9  --   --  9  --  16 16   < > 24   CR  --  0.61* 0.6*  --   --  0.65*   < > 0.70  --   --  0.68*  --  0.82 0.79   < > 0.81   GFRESTIMATED  --  >90 >60  --   --  >90   < > >90  --   --  >60  --  >60 >60   < > >60   GFRESTBLACK  --   --   --   --   --   --   --   --   --   --  >60  --  >60 >60   < > >60   POTASSIUM  --  4.1  --   --   --  3.7   < > 4.5  --   --  4.0  --  4.0 4.0   < > 4.3   TSH  --   --   --   --   --   --   --   --   --   --   --   --   --   --   --  1.81    < > = values in this interval not displayed.        Review of Systems   Constitutional: Negative for chills and fever.   HENT: Negative for congestion, ear pain, hearing loss and sore throat.    Eyes: Negative for pain and visual disturbance.   Respiratory: Negative for cough and shortness of breath.    Cardiovascular: Negative for chest pain, palpitations and peripheral edema.  "  Gastrointestinal: Negative for abdominal pain, constipation, diarrhea, heartburn, hematochezia and nausea.   Genitourinary: Negative for dysuria, frequency, genital sores, hematuria, impotence, penile discharge and urgency.   Musculoskeletal: Negative for arthralgias, joint swelling and myalgias.   Skin: Negative for rash.   Neurological: Negative for dizziness, weakness, headaches and paresthesias.   Psychiatric/Behavioral: Negative for mood changes. The patient is not nervous/anxious.        OBJECTIVE:   There were no vitals taken for this visit. Estimated body mass index is 34.11 kg/m  as calculated from the following:    Height as of 3/13/23: 1.753 m (5' 9\").    Weight as of 3/13/23: 104.8 kg (231 lb).     Physical Exam  GENERAL: healthy, alert and no distress  EYES: Eyes grossly normal to inspection, PERRL and conjunctivae and sclerae normal  HENT: ear canals and TM's normal, nose and mouth without ulcers or lesions  NECK: no adenopathy, no asymmetry, masses, or scars and thyroid normal to palpation  RESP: lungs clear to auscultation - no rales, rhonchi or wheezes  CV: regular rate and rhythm, normal S1 S2, no S3 or S4, no murmur, click or rub, no peripheral edema and peripheral pulses strong  ABDOMEN: soft, nontender, no hepatosplenomegaly, no masses and bowel sounds normal. Large fatty mass in right abdomen, previously noted.  MS: no gross musculoskeletal defects noted, no edema  SKIN: no suspicious lesions or rashes  NEURO: Normal strength and tone, mentation intact and speech normal  PSYCH: mentation appears normal, affect normal/bright  Diabetic foot exam: normal DP and PT pulses, no trophic changes or ulcerative lesions and normal sensory exam        ASSESSMENT / PLAN:     Problem List Items Addressed This Visit        Nervous and Auditory    Acute left-sided low back pain with left-sided sciatica     See visit note from 3/13 for additional details.  Patient did start gabapentin last night; reports he " tolerated well with no side effects.  We will plan to increase it to a goal dosing of 3 times daily as outlined previously.  Has planned follow-up with physical therapy.  If conservative management with medications and physical therapy fail, patient does have an active referral to orthospine for potential injections.            Digestive    Colon cancer (H)     Last colonoscopy 11/2022.  Reviewed today.  Recommended follow-up in 3 years; health maintenance adjusted today.         Class 1 obesity due to excess calories with body mass index (BMI) of 33.0 to 33.9 in adult     Discussed diet and exercise today.  Exercise is relatively limited due to patient's ongoing problems with pain.  They do report that they have purchased a recumbent bike, and as he makes progress in his pain with physical therapy, both he and his wife are planning to increase their physical activity.  We also discussed diet; he does not follow a specific diet and reports that they eat out multiple times a week.  Discussed high salt content in fast food/processed food and how it can affect things like blood pressure and overall health.            Endocrine    Hypercholesterolemia     Updated fasting labs today.  Current medications include Lipitor 80 mg daily.         Relevant Orders    Lipid panel reflex to direct LDL Fasting    Type 2 diabetes mellitus without complication (H)     Last assessed 2 months ago with patient's primary care physician.  Showed worsening of hemoglobin A1c up to 10.3.  At that time, she increased his Trulicity dose and referred him to endocrinology.  Patient has not been seen by endocrine, and did not know that a referral was placed.  I provided him with the number to schedule with them.  Foot exam completed and normal today.  Microalbumin obtained.  Patient did have an eye exam, which he reports was normal, and we have requested these records.  Discussed diet as documented elsewhere.  No adjustments today; will assist  "patient in scheduling follow-up with his PCP for the month of April to reassess.         Relevant Orders    Albumin Random Urine Quantitative with Creat Ratio    FOOT EXAM (Completed)       Circulatory    Hypertension     Current medications include 10 mg amlodipine.  At our visit on 3/13 I increased his lisinopril to 40 mg daily due to multiple instances of elevated readings in clinic.  Will plan to reassess in 1 month.         Relevant Medications    lisinopril (ZESTRIL) 40 MG tablet    Other Relevant Orders    Comprehensive metabolic panel (BMP + Alb, Alk Phos, ALT, AST, Total. Bili, TP)       Urinary    Benign prostatic hyperplasia with lower urinary tract symptoms, symptom details unspecified     Remains on Flomax 0.4 mg daily.  Reports good control symptoms.            Hematologic    Microcytic anemia     We will recheck CBC today, the patient denies any symptoms.  Has not been supplementing with iron.         Relevant Orders    CBC with platelets       Other    Encounter for Medicare annual wellness exam - Primary     Annual exam.  Chronic conditions are stable, with the exception of documentation made elsewhere.  Working to improve blood pressure and diabetes.  Updated health maintenance.  Declines lung cancer screening.  Next colonoscopy 2025.  Updated labs as documented.            COUNSELING:  Reviewed preventive health counseling, as reflected in patient instructions       Regular exercise       Healthy diet/nutrition       Bladder control       Fall risk prevention       Alcohol Use        Colon cancer screening      BMI:   Estimated body mass index is 34.11 kg/m  as calculated from the following:    Height as of 3/13/23: 1.753 m (5' 9\").    Weight as of 3/13/23: 104.8 kg (231 lb).   Weight management plan: Discussed healthy diet and exercise guidelines      He reports that he has quit smoking. He has never used smokeless tobacco.      Appropriate preventive services were discussed with this patient, " including applicable screening as appropriate for cardiovascular disease, diabetes, osteopenia/osteoporosis, and glaucoma.  As appropriate for age/gender, discussed screening for colorectal cancer, prostate cancer, breast cancer, and cervical cancer. Checklist reviewing preventive services available has been given to the patient.    Reviewed patients plan of care and provided an AVS. The Basic Care Plan (routine screening as documented in Health Maintenance) for Regis meets the Care Plan requirement. This Care Plan has been established and reviewed with the Patient and spouse.    IBIS Early CNP  [facility]    Identified Health Risks:    He is at risk for lack of exercise and has been provided with information to increase physical activity for the benefit of his well-being.  The patient was counseled and encouraged to consider modifying their diet and eating habits. He was provided with information on recommended healthy diet options.  The patient was provided with written information regarding signs of hearing loss.

## 2023-03-14 NOTE — ASSESSMENT & PLAN NOTE
Last colonoscopy 11/2022.  Reviewed today.  Recommended follow-up in 3 years; health maintenance adjusted today.

## 2023-03-14 NOTE — ASSESSMENT & PLAN NOTE
Discussed diet and exercise today.  Exercise is relatively limited due to patient's ongoing problems with pain.  They do report that they have purchased a recumbent bike, and as he makes progress in his pain with physical therapy, both he and his wife are planning to increase their physical activity.  We also discussed diet; he does not follow a specific diet and reports that they eat out multiple times a week.  Discussed high salt content in fast food/processed food and how it can affect things like blood pressure and overall health.

## 2023-03-14 NOTE — ASSESSMENT & PLAN NOTE
Current medications include 10 mg amlodipine.  At our visit on 3/13 I increased his lisinopril to 40 mg daily due to multiple instances of elevated readings in clinic.  Will plan to reassess in 1 month.

## 2023-03-29 ENCOUNTER — HOSPITAL ENCOUNTER (OUTPATIENT)
Dept: PHYSICAL THERAPY | Facility: REHABILITATION | Age: 73
Discharge: HOME | End: 2023-03-29
Payer: COMMERCIAL

## 2023-03-29 DIAGNOSIS — M25.69 DECREASED RANGE OF MOTION OF TRUNK AND BACK: ICD-10-CM

## 2023-03-29 DIAGNOSIS — M54.42 ACUTE LEFT-SIDED LOW BACK PAIN WITH LEFT-SIDED SCIATICA: ICD-10-CM

## 2023-03-29 DIAGNOSIS — M62.81 MUSCLE WEAKNESS (GENERALIZED): Primary | ICD-10-CM

## 2023-03-29 PROCEDURE — 97110 THERAPEUTIC EXERCISES: CPT | Mod: GP | Performed by: PHYSICAL THERAPIST

## 2023-03-29 PROCEDURE — 97112 NEUROMUSCULAR REEDUCATION: CPT | Mod: GP | Performed by: PHYSICAL THERAPIST

## 2023-03-29 PROCEDURE — 97161 PT EVAL LOW COMPLEX 20 MIN: CPT | Mod: GP | Performed by: PHYSICAL THERAPIST

## 2023-03-29 NOTE — PROGRESS NOTES
[facility]    OUTPATIENT PHYSICAL THERAPY ORTHOPEDIC EVALUATION  PLAN OF TREATMENT FOR OUTPATIENT REHABILITATION  (COMPLETE FOR INITIAL CLAIMS ONLY)  Patient's Last Name, First Name, M.I.  YOB: 1950  Regis Brooke    Provider s Name:  [facility]   Medical Record No.  [id number]   Start of Care Date:  03/29/23   Onset Date:  02/01/23   Type:     _X__PT   ___OT   ___SLP Medical Diagnosis:  Acute left-sided low back pain with left-sided sciatica (M54.42)     PT Diagnosis:  Acute low back pain with L leg pain with flexion bias with decreased trunk ROM and muscle weakness   Visits from SOC:  1      _________________________________________________________________________________  Plan of Treatment/Functional Goals:              Goals     Goal Description: Pt will be able to be walk through the grocery store without increase in back pain for improved QOL in 90 days.  Target Date: 06/26/23       Goal Description: Pt will be able to sleep without L leg pain interrupting sleep for improved quality of sleep in 90 days.  Target Date: 06/26/23       Goal Description: Pt will be able to bike and perform strengthening exercise program without increase in back or leg sx for improvement to be able to perform on small engine work in 90 days.  Target Date: 06/26/23                   Therapy Frequency:  1 time/week  Predicted Duration of Therapy Intervention:  90 days    Yamilet Hess, PT, DTaP, CLT                 I CERTIFY THE NEED FOR THESE SERVICES FURNISHED UNDER        THIS PLAN OF TREATMENT AND WHILE UNDER MY CARE     (Physician co-signature of this document indicates review and certification of the therapy plan).                     Certification Date From:  03/29/23   Certification Date To:  06/26/23    Referring Provider:  Leslie Suazo CNP    Initial Assessment        See  Epic Evaluation Start of Care Date: 03/29/23 03/29/23 0700   General Information   Type of Visit Initial OP Ortho PT Evaluation   Start of Care Date 03/29/23   Referring Physician Leslie Suazo CNP   Patient/Family Goals Statement no pain   Orders Evaluate and Treat   Date of Order 03/06/23   Certification Required? Yes   Medical Diagnosis Acute left-sided low back pain with left-sided sciatica (M54.42)   Surgical/Medical history reviewed Yes   Precautions/Limitations no known precautions/limitations   General Information Comments Pt reports h/o gallbladder maybe 30 years ago.   Body Part(s)   Body Part(s) Lumbar Spine/SI   Presentation and Etiology   Pertinent history of current problem (include personal factors and/or comorbidities that impact the POC) Pt reports insidious onset LBP with L leg pain around February 2023. Pt reports it started in his back but then started bothering down the leg and can hurt quite a bit in his shin. Pt reports he can also get numbness and tingling into L shin and into his foot. Pt reports he had a h/o disc herniation but when they went to scan his back they found kidney cancer so they did surgery and pt did not have to do any other treatment for that. Pt also reports about 2 years ago he had colon cancer and had surgery for that as well and is doing well with a clean scan about 6 months ago. Pt reports this past year he also had 2 surgeries for kidney stones. Pt will go back next month to see Kidney MD to discuss recurrent kidney stones.   Impairments A. Pain;D. Decreased ROM;E. Decreased flexibility;F. Decreased strength and endurance;H. Impaired gait;K. Numbness;L. Tingling   Functional Limitations perform activities of daily living;perform desired leisure / sports activities   Symptom Location Low back and down left leg into the shin   How/Where did it occur From insidious onset   Onset date of current episode/exacerbation 02/01/23   Chronicity New   Pain quality  C. Aching   Frequency of pain/symptoms C. With activity   Pain/symptoms are: Worse during the night   Pain/symptoms exacerbated by B. Walking;G. Certain positions;J. ADL;K. Home tasks   Pain/symptoms eased by A. Sitting   Progression of symptoms since onset: Worsened   Fall Risk Screen   Fall screen completed by PT   Have you fallen 2 or more times in the past year? No   Have you fallen and had an injury in the past year? No   Is patient a fall risk? No   Abuse Screen (yes response referral indicated)   Feels Unsafe at Home or Work/School no   Feels Threatened by Someone no   Does Anyone Try to Keep You From Having Contact with Others or Doing Things Outside Your Home? no   Physical Signs of Abuse Present no   Functional Scales   Functional Scales Other   Other Scales  NDI 24%   Lumbar Spine/SI Objective Findings   Hip Flexor Flexibility Sig tightness B LE   Flexion ROM Trunk flexion WNL with min Left low back pain   Extension ROM Min limited with increase in L leg sx   Right Side Bending ROM Mod limited with increased low back pain   Left Side Bending ROM Mod limited with increased low back pain   Hip Flexion (L2) Strength R hip flexor 4+/5, L hip flexor 4/5 without pain   Knee Extension (L3) Strength B quad 5/5 with some increased left shin pain   Ankle Dorsiflexion (L4) Strength B DF 5/5 without increased left leg pain   Ankle Plantar Flexion (S1) Strength B heel raises x10 reps with fatigue   Lumbar/Hip/Knee/Foot Strength Comments Significant posterior trunk strength deficits noted with difficulty with sit to stand and standing lumbar trunk ROM testing   Segmental Mobility Significant hypomobility lumbar spine   Slump Test Positive L LE   Clinical Impression   Criteria for Skilled Therapeutic Interventions Met yes, treatment indicated   PT Diagnosis Acute low back pain with L leg pain with flexion bias with decreased trunk ROM and muscle weakness   Clinical Presentation Stable/Uncomplicated   Clinical Decision  Making (Complexity) Low complexity   Therapy Frequency 1 time/week   Predicted Duration of Therapy Intervention (days/wks) 90 days   Risk & Benefits of therapy have been explained Yes   Patient, Family & other staff in agreement with plan of care Yes   Clinical Impression Comments Pt demo's signs and sx consistent with LBP in flexion biased pattern with decreased trunk ROM and muscle weakness causing difficulty with ADLs, functional mobility and walking distance. Pt is good candidate for skilled PT services as outlined to address impairments and reach goals.   ORTHO GOALS   PT Ortho Eval Goals 1;2;3   Ortho Goal 1   Goal Description Pt will be able to be walk through the grocery store without increase in back pain for improved QOL in 90 days.   Target Date 06/26/23   Ortho Goal 2   Goal Description Pt will be able to sleep without L leg pain interrupting sleep for improved quality of sleep in 90 days.   Target Date 06/26/23   Ortho Goal 3   Goal Description Pt will be able to bike and perform strengthening exercise program without increase in back or leg sx for improvement to be able to perform on small engine work in 90 days.   Target Date 06/26/23   Total Evaluation Time   PT Eval, Low Complexity Minutes (11965) 30   Therapy Certification   Certification date from 03/29/23   Certification date to 06/26/23   Medical Diagnosis Acute left-sided low back pain with left-sided sciatica (M54.42)

## 2023-04-05 ENCOUNTER — HOSPITAL ENCOUNTER (OUTPATIENT)
Dept: PHYSICAL THERAPY | Facility: REHABILITATION | Age: 73
Discharge: HOME | End: 2023-04-05
Payer: COMMERCIAL

## 2023-04-05 DIAGNOSIS — M25.69 DECREASED RANGE OF MOTION OF TRUNK AND BACK: ICD-10-CM

## 2023-04-05 DIAGNOSIS — M54.42 ACUTE LEFT-SIDED LOW BACK PAIN WITH LEFT-SIDED SCIATICA: Primary | ICD-10-CM

## 2023-04-05 DIAGNOSIS — M62.81 MUSCLE WEAKNESS (GENERALIZED): ICD-10-CM

## 2023-04-05 PROCEDURE — 97112 NEUROMUSCULAR REEDUCATION: CPT | Mod: GP

## 2023-04-05 PROCEDURE — 97110 THERAPEUTIC EXERCISES: CPT | Mod: GP

## 2023-04-25 ENCOUNTER — HOSPITAL ENCOUNTER (OUTPATIENT)
Dept: PHYSICAL THERAPY | Facility: REHABILITATION | Age: 73
Discharge: HOME | End: 2023-04-25
Payer: COMMERCIAL

## 2023-04-25 DIAGNOSIS — M54.42 ACUTE LEFT-SIDED LOW BACK PAIN WITH LEFT-SIDED SCIATICA: Primary | ICD-10-CM

## 2023-04-25 DIAGNOSIS — M25.69 DECREASED RANGE OF MOTION OF TRUNK AND BACK: ICD-10-CM

## 2023-04-25 DIAGNOSIS — M62.81 MUSCLE WEAKNESS (GENERALIZED): ICD-10-CM

## 2023-04-25 PROCEDURE — 97110 THERAPEUTIC EXERCISES: CPT | Mod: GP | Performed by: PHYSICAL THERAPIST

## 2023-04-26 ENCOUNTER — OFFICE VISIT (OUTPATIENT)
Dept: FAMILY MEDICINE | Facility: CLINIC | Age: 73
End: 2023-04-26
Payer: COMMERCIAL

## 2023-04-26 VITALS
DIASTOLIC BLOOD PRESSURE: 78 MMHG | BODY MASS INDEX: 34 KG/M2 | RESPIRATION RATE: 16 BRPM | HEIGHT: 69 IN | SYSTOLIC BLOOD PRESSURE: 128 MMHG | HEART RATE: 103 BPM | OXYGEN SATURATION: 97 % | WEIGHT: 229.6 LBS

## 2023-04-26 DIAGNOSIS — E11.9 TYPE 2 DIABETES MELLITUS WITHOUT COMPLICATION, UNSPECIFIED WHETHER LONG TERM INSULIN USE (H): ICD-10-CM

## 2023-04-26 DIAGNOSIS — I10 ESSENTIAL HYPERTENSION: ICD-10-CM

## 2023-04-26 LAB
ANION GAP SERPL CALCULATED.3IONS-SCNC: 14 MMOL/L (ref 7–15)
BUN SERPL-MCNC: 12 MG/DL (ref 8–23)
CALCIUM SERPL-MCNC: 9.7 MG/DL (ref 8.8–10.2)
CHLORIDE SERPL-SCNC: 103 MMOL/L (ref 98–107)
CREAT SERPL-MCNC: 0.7 MG/DL (ref 0.67–1.17)
DEPRECATED HCO3 PLAS-SCNC: 23 MMOL/L (ref 22–29)
GFR SERPL CREATININE-BSD FRML MDRD: >90 ML/MIN/1.73M2
GLUCOSE SERPL-MCNC: 169 MG/DL (ref 70–99)
HBA1C MFR BLD: 9.9 % (ref 0–5.6)
POTASSIUM SERPL-SCNC: 4.7 MMOL/L (ref 3.4–5.3)
SODIUM SERPL-SCNC: 140 MMOL/L (ref 136–145)

## 2023-04-26 PROCEDURE — 99214 OFFICE O/P EST MOD 30 MIN: CPT | Performed by: FAMILY MEDICINE

## 2023-04-26 PROCEDURE — 36415 COLL VENOUS BLD VENIPUNCTURE: CPT | Performed by: FAMILY MEDICINE

## 2023-04-26 PROCEDURE — 80048 BASIC METABOLIC PNL TOTAL CA: CPT | Performed by: FAMILY MEDICINE

## 2023-04-26 PROCEDURE — 83036 HEMOGLOBIN GLYCOSYLATED A1C: CPT | Performed by: FAMILY MEDICINE

## 2023-04-26 NOTE — ASSESSMENT & PLAN NOTE
Uncontrolled diabetes -today's A1c is 9.9 today     Continue metformin 2000 mg/day  Continue glipizide 10 mg orally per day  trulicity 4.5mg subcutaneous per week  Continue aspirin, Lipitor, lisinopril    Diabetic eye exam is ordered today.     Lipid done and look good 3/14/2023     The patient is a former smoker and is encouraged to continue to abstain from cigarettes     Encouraged to limit alcohol    Diabetes education has still not been done. He is encouraged to schedule with both endocrine and diabetes ed.   We discussed the importance of getting control of his blood sugars especially as he recovers from colon cancer and to optimize his comorbid conditions

## 2023-04-26 NOTE — PROGRESS NOTES
Assessment & Plan   Problem List Items Addressed This Visit        Endocrine    Type 2 diabetes mellitus without complication (H)     Uncontrolled diabetes -today's A1c is 9.9 today     Continue metformin 2000 mg/day  Continue glipizide 10 mg orally per day  trulicity 4.5mg subcutaneous per week  Continue aspirin, Lipitor, lisinopril    Diabetic eye exam is ordered today.     Lipid done and look good 3/14/2023     The patient is a former smoker and is encouraged to continue to abstain from cigarettes     Encouraged to limit alcohol    Diabetes education has still not been done. He is encouraged to schedule with both endocrine and diabetes ed.   We discussed the importance of getting control of his blood sugars especially as he recovers from colon cancer and to optimize his comorbid conditions         Relevant Orders    Adult Endocrinology  Referral       Circulatory    Hypertension            Priti Charles MD  [facility]    Nevin Rosenberg is a 72 year old, presenting for the following health issues:  Follow Up (Diabetes and A1C)        4/26/2023    10:29 AM   Additional Questions   Roomed by Tomás Oneal MA   Accompanied by wife         4/26/2023    10:29 AM   Patient Reported Additional Medications   Patient reports taking the following new medications None     History of Present Illness       Diabetes:   He presents for follow up of diabetes.  He is checking home blood glucose two times daily. He checks blood glucose before meals and at bedtime.  Blood glucose is sometimes over 200 and never under 70. He is aware of hypoglycemia symptoms including shakiness, dizziness and weakness. He has no concerns regarding his diabetes at this time.  He is not experiencing numbness or burning in feet, excessive thirst, blurry vision, weight changes or redness, sores or blisters on feet.         Reason for visit:  Left hip and leg pain    He eats 2-3 servings of fruits and vegetables  "daily.He consumes 1 sweetened beverage(s) daily.He exercises with enough effort to increase his heart rate 10 to 19 minutes per day.  He exercises with enough effort to increase his heart rate 3 or less days per week.   He is taking medications regularly.           Objective    /78 (BP Location: Left arm, Patient Position: Sitting, Cuff Size: Adult Large)   Pulse 103   Resp 16   Ht 1.745 m (5' 8.7\")   Wt 104.1 kg (229 lb 9.6 oz)   SpO2 97%   BMI 34.20 kg/m    Body mass index is 34.2 kg/m .  Physical Exam  Constitutional:       Appearance: Normal appearance.   HENT:      Head: Normocephalic and atraumatic.   Cardiovascular:      Rate and Rhythm: Normal rate and regular rhythm.   Pulmonary:      Effort: Pulmonary effort is normal.   Musculoskeletal:         General: Normal range of motion.      Cervical back: Normal range of motion and neck supple.   Neurological:      General: No focal deficit present.      Mental Status: He is alert.          "

## 2023-05-15 DIAGNOSIS — N40.1 BENIGN PROSTATIC HYPERPLASIA WITH LOWER URINARY TRACT SYMPTOMS, SYMPTOM DETAILS UNSPECIFIED: ICD-10-CM

## 2023-05-15 DIAGNOSIS — E11.9 TYPE 2 DIABETES MELLITUS WITHOUT COMPLICATION, WITHOUT LONG-TERM CURRENT USE OF INSULIN (H): ICD-10-CM

## 2023-05-15 RX ORDER — GLIPIZIDE 10 MG/1
TABLET, FILM COATED, EXTENDED RELEASE ORAL
Qty: 90 TABLET | Refills: 0 | Status: SHIPPED | OUTPATIENT
Start: 2023-05-15 | End: 2023-08-14

## 2023-05-15 RX ORDER — TAMSULOSIN HYDROCHLORIDE 0.4 MG/1
CAPSULE ORAL
Qty: 90 CAPSULE | Refills: 2 | Status: SHIPPED | OUTPATIENT
Start: 2023-05-15 | End: 2024-02-12

## 2023-05-15 NOTE — TELEPHONE ENCOUNTER
"glipiZIDE (GLUCOTROL XL) 10 MG 24 hr tablet  Last Written Prescription Date:  2/12/2023  Last Fill Quantity: 90,  # refills: 0   Last office visit provider:  4/26/2023     Routing refill request to provider for review/approval because:  A break in medication    tamsulosin (FLOMAX) 0.4 MG capsule  Last Written Prescription Date:  5/11/2022  Last Fill Quantity: 90,  # refills: 2   Last office visit provider:  4/26/2023     Requested Prescriptions   Pending Prescriptions Disp Refills     glipiZIDE (GLUCOTROL XL) 10 MG 24 hr tablet [Pharmacy Med Name: GLIPIZIDE ER 10MG TABLETS] 90 tablet 0     Sig: TAKE 1 TABLET(10 MG) BY MOUTH DAILY       Sulfonylurea Agents Passed - 5/15/2023  3:16 AM        Passed - Patient has documented A1c within the specified period of time.     If HgbA1C is 8 or greater, it needs to be on file within the past 3 months.  If less than 8, must be on file within the past 6 months.     Recent Labs   Lab Test 04/26/23  1024   A1C 9.9*             Passed - Medication is active on med list        Passed - Patient is age 18 or older        Passed - Patient has a recent creatinine (normal) within the past 12 mos.     Recent Labs   Lab Test 04/26/23  1024   CR 0.70       Ok to refill medication if creatinine is low          Passed - Recent (6 mo) or future (30 days) visit within the authorizing provider's specialty     Patient had office visit in the last 6 months or has a visit in the next 30 days with authorizing provider or within the authorizing provider's specialty.  See \"Patient Info\" tab in inbasket, or \"Choose Columns\" in Meds & Orders section of the refill encounter.               tamsulosin (FLOMAX) 0.4 MG capsule [Pharmacy Med Name: TAMSULOSIN 0.4MG CAPSULES] 90 capsule 2     Sig: TAKE 1 CAPSULE(0.4 MG) BY MOUTH DAILY       Alpha Blockers Passed - 5/15/2023  3:16 AM        Passed - Blood pressure under 140/90 in past 12 months     BP Readings from Last 3 Encounters:   04/26/23 128/78   03/14/23 " "(!) 151/83   03/13/23 (!) 163/84                 Passed - Recent (12 mo) or future (30 days) visit within the authorizing provider's specialty     Patient has had an office visit with the authorizing provider or a provider within the authorizing providers department within the previous 12 mos or has a future within next 30 days. See \"Patient Info\" tab in inbasket, or \"Choose Columns\" in Meds & Orders section of the refill encounter.              Passed - Patient does not have Tadalafil, Vardenafil, or Sildenafil on their medication list        Passed - Medication is active on med list        Passed - Patient is 18 years of age or older             Herminia Schmidt RN 05/15/23 3:23 AM  "

## 2023-06-12 DIAGNOSIS — I10 ESSENTIAL HYPERTENSION: ICD-10-CM

## 2023-06-12 RX ORDER — LISINOPRIL 40 MG/1
40 TABLET ORAL DAILY
Qty: 90 TABLET | Refills: 0 | Status: SHIPPED | OUTPATIENT
Start: 2023-06-12 | End: 2024-01-22

## 2023-06-12 NOTE — TELEPHONE ENCOUNTER
Medication Request  Medication name: lisinopril (ZESTRIL) 40 MG tablet  Requested Pharmacy: [facility] [id number]  When was patient last seen?:  04/26/23  Patient offered appointment:  [facility] request. (07/26/23)  Okay to leave a detailed message: yes

## 2023-06-12 NOTE — TELEPHONE ENCOUNTER
"Routing refill request to provider for review/approval because:  Update sig - is patient continuing on this medication?    Last Written Prescription Date:  3/14/2022  Last Fill Quantity: 90,  # refills: 0   Last office visit provider:  4/26/2023     Requested Prescriptions   Pending Prescriptions Disp Refills     lisinopril (ZESTRIL) 40 MG tablet 90 tablet 0     Sig: Take 1 tablet (40 mg) by mouth daily       ACE Inhibitors (Including Combos) Protocol Passed - 6/12/2023  3:25 PM        Passed - Blood pressure under 140/90 in past 12 months     BP Readings from Last 3 Encounters:   04/26/23 128/78   03/14/23 (!) 151/83   03/13/23 (!) 163/84                 Passed - Recent (12 mo) or future (30 days) visit within the authorizing provider's specialty     Patient has had an office visit with the authorizing provider or a provider within the authorizing providers department within the previous 12 mos or has a future within next 30 days. See \"Patient Info\" tab in inbasket, or \"Choose Columns\" in Meds & Orders section of the refill encounter.              Passed - Medication is active on med list        Passed - Patient is age 18 or older        Passed - Normal serum creatinine on file in past 12 months     Recent Labs   Lab Test 04/26/23  1024   CR 0.70       Ok to refill medication if creatinine is low          Passed - Normal serum potassium on file in past 12 months     Recent Labs   Lab Test 04/26/23  1024   POTASSIUM 4.7                  Glo Onofre RN 06/12/23 3:25 PM  "

## 2023-06-19 ENCOUNTER — MYC MEDICAL ADVICE (OUTPATIENT)
Dept: FAMILY MEDICINE | Facility: CLINIC | Age: 73
End: 2023-06-19
Payer: COMMERCIAL

## 2023-06-19 NOTE — TELEPHONE ENCOUNTER
3/14 OV notes-    Hypertension       Current medications include 10 mg amlodipine.  At our visit on 3/13 I increased his lisinopril to 40 mg daily due to multiple instances of elevated readings in clinic.  Will plan to reassess in 1 month.           Follow-up on 4/26 indicates patient to continue on lisinopril.     Confirmed with pharmacy patient had picked up an old rx that had been discontinued. Attempt to discuss with patient via telephone, no answer generic vm left. [employer] message also sent.

## 2023-06-25 DIAGNOSIS — E78.2 MIXED HYPERLIPIDEMIA: ICD-10-CM

## 2023-06-25 DIAGNOSIS — I10 ESSENTIAL HYPERTENSION: ICD-10-CM

## 2023-06-26 RX ORDER — ATORVASTATIN CALCIUM 80 MG/1
TABLET, FILM COATED ORAL
Qty: 90 TABLET | Refills: 3 | Status: SHIPPED | OUTPATIENT
Start: 2023-06-26 | End: 2024-04-10

## 2023-06-26 RX ORDER — AMLODIPINE BESYLATE 10 MG/1
TABLET ORAL
Qty: 90 TABLET | Refills: 3 | Status: SHIPPED | OUTPATIENT
Start: 2023-06-26 | End: 2024-04-10

## 2023-06-26 NOTE — TELEPHONE ENCOUNTER
"Last Written Prescription Date:  3/27/2023  Last Fill Quantity: 90,  # refills: 0   Last office visit provider:  4/26/2023     Requested Prescriptions   Pending Prescriptions Disp Refills     amLODIPine (NORVASC) 10 MG tablet [Pharmacy Med Name: AMLODIPINE BESYLATE 10MG TABLETS] 90 tablet 0     Sig: TAKE 1 TABLET(10 MG) BY MOUTH DAILY AS DIRECTED       Calcium Channel Blockers Protocol  Passed - 6/26/2023 10:35 AM        Passed - Blood pressure under 140/90 in past 12 months     BP Readings from Last 3 Encounters:   04/26/23 128/78   03/14/23 (!) 151/83   03/13/23 (!) 163/84                 Passed - Recent (12 mo) or future (30 days) visit within the authorizing provider's specialty     Patient has had an office visit with the authorizing provider or a provider within the authorizing providers department within the previous 12 mos or has a future within next 30 days. See \"Patient Info\" tab in inbasket, or \"Choose Columns\" in Meds & Orders section of the refill encounter.              Passed - Medication is active on med list        Passed - Patient is age 18 or older        Passed - Normal serum creatinine on file in past 12 months     Recent Labs   Lab Test 04/26/23  1024   CR 0.70       Ok to refill medication if creatinine is low        Last Written Prescription Date:  1/3/2023  Last Fill Quantity: 90,  # refills: 1   Last office visit provider:  4/26/2023        atorvastatin (LIPITOR) 80 MG tablet [Pharmacy Med Name: ATORVASTATIN 80MG TABLETS] 90 tablet 1     Sig: TAKE 1 TABLET(80 MG) BY MOUTH DAILY       Statins Protocol Passed - 6/26/2023 10:35 AM        Passed - LDL on file in past 12 months     Recent Labs   Lab Test 03/14/23  0746   LDL 55             Passed - No abnormal creatine kinase in past 12 months     No lab results found.             Passed - Recent (12 mo) or future (30 days) visit within the authorizing provider's specialty     Patient has had an office visit with the authorizing provider or a " "provider within the authorizing providers department within the previous 12 mos or has a future within next 30 days. See \"Patient Info\" tab in inbasket, or \"Choose Columns\" in Meds & Orders section of the refill encounter.              Passed - Medication is active on med list        Passed - Patient is age 18 or older             Amalia Granados RN 06/26/23 10:36 AM  "

## 2023-06-30 ENCOUNTER — TELEPHONE (OUTPATIENT)
Dept: FAMILY MEDICINE | Facility: CLINIC | Age: 73
End: 2023-06-30
Payer: COMMERCIAL

## 2023-06-30 NOTE — TELEPHONE ENCOUNTER
Patient Quality Outreach    Patient is due for the following:   Diabetes -  Diabetic Follow-Up Visit    Next Steps:   Patient was scheduled for 7/26/23    Type of outreach:    Phone, spoke to patient/parent. Patient    Next Steps:  Reach out within 90 days via Phone.    Max number of attempts reached: No. Will try again in 90 days if patient still on fail list.    Questions for provider review:    None           Tomás Oneal MA  Chart routed to Self.

## 2023-07-28 ENCOUNTER — TELEPHONE (OUTPATIENT)
Dept: FAMILY MEDICINE | Facility: CLINIC | Age: 73
End: 2023-07-28

## 2023-07-28 ENCOUNTER — OFFICE VISIT (OUTPATIENT)
Dept: ENDOCRINOLOGY | Facility: CLINIC | Age: 73
End: 2023-07-28
Payer: COMMERCIAL

## 2023-07-28 VITALS
OXYGEN SATURATION: 96 % | HEART RATE: 95 BPM | SYSTOLIC BLOOD PRESSURE: 140 MMHG | WEIGHT: 224 LBS | BODY MASS INDEX: 33.37 KG/M2 | DIASTOLIC BLOOD PRESSURE: 78 MMHG

## 2023-07-28 DIAGNOSIS — E11.9 TYPE 2 DIABETES MELLITUS WITHOUT COMPLICATION, UNSPECIFIED WHETHER LONG TERM INSULIN USE (H): ICD-10-CM

## 2023-07-28 PROCEDURE — 99214 OFFICE O/P EST MOD 30 MIN: CPT | Performed by: NURSE PRACTITIONER

## 2023-07-28 RX ORDER — POLYETHYLENE GLYCOL 3350, SODIUM CHLORIDE, SODIUM BICARBONATE, POTASSIUM CHLORIDE 420; 11.2; 5.72; 1.48 G/4L; G/4L; G/4L; G/4L
POWDER, FOR SOLUTION ORAL
COMMUNITY
End: 2024-04-10

## 2023-07-28 NOTE — TELEPHONE ENCOUNTER
Patient Quality Outreach    Patient is due for the following:   Diabetes -  A1C and BP Check  Hypertension -  Hypertension follow-up visit    Next Steps:   Patient cancel appt on 7/26/23, left voicmail to call clinic to reschedule appt for a follow up on diabetes and BP recheck    Type of outreach:    Phone, left message for patient/parent to call back.    Next Steps:  Reach out within 90 days via Phone.    Max number of attempts reached: No. Will try again in 90 days if patient still on fail list.    Questions for provider review:    None           Tomás Oneal MA  Chart routed to Self.

## 2023-07-28 NOTE — PROGRESS NOTES
"[facility] ENDOCRINOLOGY    Diabetes Note 7/28/2023    Regis Brooke, 1950, [id number]          Reason for visit      1. Type 2 diabetes mellitus without complication, unspecified whether long term insulin use (H)        HPI     Regis Brooke is a very pleasant 73 year old old male who presents for follow up.  SUMMARY:    Chet is here in referral for poorly managed DM 2. He is here with his wife. She is a diabetic as well, and will also be seeing me in referral in about a month.    He has been a diabetic for about 13 years. He does not regularly check his BG because he doesn't like sticking himself.  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 He has been taking Metformin and Glipizide XL for some time.     His most recent A1c was 9.9 and somewhat improved from his previous of 10.3.  As little as a year and a half ago, his A1c was 7.1, and in 2021, it was 6.0.    History of Renal cancer, with R nephrectomy in 2010. Lithotrypsy done in August of 2022. Current Renal function is within normal range.     Pt is on an ACE and a Calcium Channel Blocker for HTN management as well as Renal protection.     History of Colon Cancer. He was experiencing Iron Deficiency Anemia, and a Colonoscopy found a friable ulceration. Bx proved to be moderately differentiated adenocarcinoma. He underwent a R Colectomy in late July of 2021.     Recent Lipid profile showed good response to high dose Statin.     Chet has 3 pieces of exercise equipment at his disposal in his home. He is not using any of them at the present time. Treadmill, \"Jeremiah\", and a Stationary Bike. He spends much of his day \"tinkering\" with things in either his garage, or someone else's.     Blood glucose data:      Past Medical History     Patient Active Problem List   Diagnosis    Hypercholesterolemia    Hypertension    Calculus of kidney and ureter    Type 2 diabetes mellitus " without complication (H)    History of primary malignant neoplasm of kidney    Vitamin D Deficiency    Preventative health care    Wears hearing aid - bilateral    Pain in both hands    Cognitive deficits    Microcytic anemia    Malignant neoplasm of ascending colon (H)    Colon cancer (H)    Diverticular disease of colon    Bilateral impacted cerumen    Benign prostatic hyperplasia with lower urinary tract symptoms, symptom details unspecified    Class 1 obesity due to excess calories with body mass index (BMI) of 33.0 to 33.9 in adult    RLQ abdominal mass    Acute left-sided low back pain with left-sided sciatica    Neuropathic pain, leg, left    Encounter for Medicare annual wellness exam        Family History       family history is not on file.    Social History      reports that he has quit smoking. He has never used smokeless tobacco. He reports current alcohol use of about 1.0 standard drink of alcohol per week. He reports that he does not use drugs.      Review of Systems     Patient has no polyuria or polydipsia, no chest pain, dyspnea or TIA's, no numbness, tingling or pain in extremities  Remainder negative except as noted in HPI.    Vital Signs     BP (!) 140/78 (BP Location: Right arm, Patient Position: Sitting, Cuff Size: Adult Large)   Pulse 95   Wt 101.6 kg (224 lb)   SpO2 96%   BMI 33.37 kg/m    Wt Readings from Last 3 Encounters:   07/28/23 101.6 kg (224 lb)   04/26/23 104.1 kg (229 lb 9.6 oz)   03/14/23 105 kg (231 lb 9 oz)       Physical Exam     Constitutional:  Well developed, Well nourished  HENT:  Normocephalic,   Neck: normal in appearance  Eyes:  PERRL, Conjunctiva pink  Respiratory:  No respiratory distress  Skin: No acanthosis nigricans, lipoatrophy or lipodystrophy  Neurologic:  Alert & oriented x 3, nonfocal  Psychiatric:  Affect, Mood, Insight appropriate        Assessment     1. Type 2 diabetes mellitus without complication, unspecified whether long term insulin use (H)   "      Plan     We would like to avoid Insulin if at all possible, however it might be necessary if we are unable to find him affordable medications. Jardiance would be a good choice for him, and this was ordered today. It is also Tier 3, and it remains to be seen if he can use it.     Januvia is also Tier 3, which would be a choice, but won't get him the drop in his A1c for which we are looking.     Chet is going to work on utilizing his exercise equipment again. He notes that \"I used to do an hour a day\". Likely this was happening about the time that he had an A1c of 6.    Follow-up with me in 3 months.      Angela Garner NP  [facility]  7/28/2023  1:09 PM      Lab Results     Microalbumin Urine mg/dL   Date Value Ref Range Status   02/09/2022 2.85 (H) 0.00 - 1.99 mg/dL Final       Cholesterol   Date Value Ref Range Status   03/14/2023 128 <200 mg/dL Final     Direct Measure HDL   Date Value Ref Range Status   03/14/2023 58 >=40 mg/dL Final     Triglycerides   Date Value Ref Range Status   03/14/2023 76 <150 mg/dL Final       [unfilled]      Current Medications     Outpatient Medications Prior to Visit   Medication Sig Dispense Refill    amLODIPine (NORVASC) 10 MG tablet TAKE 1 TABLET(10 MG) BY MOUTH DAILY AS DIRECTED 90 tablet 3    aspirin (ASA) 325 MG EC tablet Take 325 mg by mouth daily      atorvastatin (LIPITOR) 80 MG tablet TAKE 1 TABLET(80 MG) BY MOUTH DAILY 90 tablet 3    calcium carbonate (OS-EDER) 500 MG tablet Take 1 tablet by mouth daily      CRANBERRY EXTRACT (CRANBERRY ORAL) [CRANBERRY EXTRACT (CRANBERRY ORAL)] Take by mouth.      cyanocobalamin (VITAMIN B-12) 100 MCG tablet Take 100 mcg by mouth daily      glipiZIDE (GLUCOTROL XL) 10 MG 24 hr tablet TAKE 1 TABLET(10 MG) BY MOUTH DAILY 90 tablet 0    lisinopril (ZESTRIL) 40 MG tablet Take 1 tablet (40 mg) by mouth daily 90 tablet 0    metFORMIN (GLUCOPHAGE) 500 MG tablet TAKE 2 TABLETS(1000 MG) BY MOUTH TWICE DAILY WITH MEALS 360 tablet 1    " MULTIVITAMIN ORAL Take 1 tablet by mouth daily       ONETOUCH VERIO IQ test strip USE AND DISCARD 1 TEST     STRIP 3 TIMES DAILY AS     DIRECTED. 300 strip 3    tamsulosin (FLOMAX) 0.4 MG capsule TAKE 1 CAPSULE(0.4 MG) BY MOUTH DAILY 90 capsule 2    Vitamin D3 (CHOLECALCIFEROL) 125 MCG (5000 UT) tablet Take 125 mcg by mouth daily      polyethylene glycol-electrolytes (NULYTELY) 420 g solution MIX AND DRINK AS DIRECTED      Dulaglutide (TRULICITY) 4.5 MG/0.5ML SOPN Inject 4.5 mg Subcutaneous once a week (Patient not taking: Reported on 7/28/2023) 6 mL 0     No facility-administered medications prior to visit.         Answers submitted by the patient for this visit:  Symptoms you have experienced in the last 30 days (Submitted on 7/28/2023)  General Symptoms: No  Skin Symptoms: No  HENT Symptoms: No  EYE SYMPTOMS: No  HEART SYMPTOMS: No  LUNG SYMPTOMS: No  INTESTINAL SYMPTOMS: No  URINARY SYMPTOMS: No  REPRODUCTIVE SYMPTOMS: No  SKELETAL SYMPTOMS: No  BLOOD SYMPTOMS: No  NERVOUS SYSTEM SYMPTOMS: No  MENTAL HEALTH SYMPTOMS: No

## 2023-07-28 NOTE — LETTER
"    7/28/2023         RE: Regis Brooke  [address] [place] [address]        Dear Colleague,    Thank you for referring your patient, Regis Brooke, to the [facility]. Please see a copy of my visit note below.    [facility] ENDOCRINOLOGY    Diabetes Note 7/28/2023    Regis Brooke, 1950, [id number]          Reason for visit      1. Type 2 diabetes mellitus without complication, unspecified whether long term insulin use (H)        HPI     Regis Brooke is a very pleasant 73 year old old male who presents for follow up.  SUMMARY:    Chet is here in referral for poorly managed DM 2. He is here with his wife. She is a diabetic as well, and will also be seeing me in referral in about a month.    He has been a diabetic for about 13 years. He does not regularly check his BG because he doesn't like sticking himself.  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 He has been taking Metformin and Glipizide XL for some time.     His most recent A1c was 9.9 and somewhat improved from his previous of 10.3.  As little as a year and a half ago, his A1c was 7.1, and in 2021, it was 6.0.    History of Renal cancer, with R nephrectomy in 2010. Lithotrypsy done in August of 2022. Current Renal function is within normal range.     Pt is on an ACE and a Calcium Channel Blocker for HTN management as well as Renal protection.     History of Colon Cancer. He was experiencing Iron Deficiency Anemia, and a Colonoscopy found a friable ulceration. Bx proved to be moderately differentiated adenocarcinoma. He underwent a R Colectomy in late July of 2021.     Recent Lipid profile showed good response to high dose Statin.     Chet has 3 pieces of exercise equipment at his disposal in his home. He is not using any of them at the present time. Treadmill, \"Jeremiah\", and a Stationary Bike. He spends much of his day " "\"tinkering\" with things in either his garage, or someone else's.     Blood glucose data:      Past Medical History     Patient Active Problem List   Diagnosis     Hypercholesterolemia     Hypertension     Calculus of kidney and ureter     Type 2 diabetes mellitus without complication (H)     History of primary malignant neoplasm of kidney     Vitamin D Deficiency     Preventative health care     Wears hearing aid - bilateral     Pain in both hands     Cognitive deficits     Microcytic anemia     Malignant neoplasm of ascending colon (H)     Colon cancer (H)     Diverticular disease of colon     Bilateral impacted cerumen     Benign prostatic hyperplasia with lower urinary tract symptoms, symptom details unspecified     Class 1 obesity due to excess calories with body mass index (BMI) of 33.0 to 33.9 in adult     RLQ abdominal mass     Acute left-sided low back pain with left-sided sciatica     Neuropathic pain, leg, left     Encounter for Medicare annual wellness exam        Family History       family history is not on file.    Social History      reports that he has quit smoking. He has never used smokeless tobacco. He reports current alcohol use of about 1.0 standard drink of alcohol per week. He reports that he does not use drugs.      Review of Systems     Patient has no polyuria or polydipsia, no chest pain, dyspnea or TIA's, no numbness, tingling or pain in extremities  Remainder negative except as noted in HPI.    Vital Signs     BP (!) 140/78 (BP Location: Right arm, Patient Position: Sitting, Cuff Size: Adult Large)   Pulse 95   Wt 101.6 kg (224 lb)   SpO2 96%   BMI 33.37 kg/m    Wt Readings from Last 3 Encounters:   07/28/23 101.6 kg (224 lb)   04/26/23 104.1 kg (229 lb 9.6 oz)   03/14/23 105 kg (231 lb 9 oz)       Physical Exam     Constitutional:  Well developed, Well nourished  HENT:  Normocephalic,   Neck: normal in appearance  Eyes:  PERRL, Conjunctiva pink  Respiratory:  No respiratory " "distress  Skin: No acanthosis nigricans, lipoatrophy or lipodystrophy  Neurologic:  Alert & oriented x 3, nonfocal  Psychiatric:  Affect, Mood, Insight appropriate        Assessment     1. Type 2 diabetes mellitus without complication, unspecified whether long term insulin use (H)        Plan     We would like to avoid Insulin if at all possible, however it might be necessary if we are unable to find him affordable medications. Jardiance would be a good choice for him, and this was ordered today. It is also Tier 3, and it remains to be seen if he can use it.     Januvia is also Tier 3, which would be a choice, but won't get him the drop in his A1c for which we are looking.     Chet is going to work on utilizing his exercise equipment again. He notes that \"I used to do an hour a day\". Likely this was happening about the time that he had an A1c of 6.    Follow-up with me in 3 months.      Angela Garner NP  [facility]  7/28/2023  1:09 PM      Lab Results     Microalbumin Urine mg/dL   Date Value Ref Range Status   02/09/2022 2.85 (H) 0.00 - 1.99 mg/dL Final       Cholesterol   Date Value Ref Range Status   03/14/2023 128 <200 mg/dL Final     Direct Measure HDL   Date Value Ref Range Status   03/14/2023 58 >=40 mg/dL Final     Triglycerides   Date Value Ref Range Status   03/14/2023 76 <150 mg/dL Final       [unfilled]      Current Medications     Outpatient Medications Prior to Visit   Medication Sig Dispense Refill     amLODIPine (NORVASC) 10 MG tablet TAKE 1 TABLET(10 MG) BY MOUTH DAILY AS DIRECTED 90 tablet 3     aspirin (ASA) 325 MG EC tablet Take 325 mg by mouth daily       atorvastatin (LIPITOR) 80 MG tablet TAKE 1 TABLET(80 MG) BY MOUTH DAILY 90 tablet 3     calcium carbonate (OS-EDER) 500 MG tablet Take 1 tablet by mouth daily       CRANBERRY EXTRACT (CRANBERRY ORAL) [CRANBERRY EXTRACT (CRANBERRY ORAL)] Take by mouth.       cyanocobalamin (VITAMIN B-12) 100 MCG tablet Take 100 mcg by mouth daily       " glipiZIDE (GLUCOTROL XL) 10 MG 24 hr tablet TAKE 1 TABLET(10 MG) BY MOUTH DAILY 90 tablet 0     lisinopril (ZESTRIL) 40 MG tablet Take 1 tablet (40 mg) by mouth daily 90 tablet 0     metFORMIN (GLUCOPHAGE) 500 MG tablet TAKE 2 TABLETS(1000 MG) BY MOUTH TWICE DAILY WITH MEALS 360 tablet 1     MULTIVITAMIN ORAL Take 1 tablet by mouth daily        ONETOUCH VERIO IQ test strip USE AND DISCARD 1 TEST     STRIP 3 TIMES DAILY AS     DIRECTED. 300 strip 3     tamsulosin (FLOMAX) 0.4 MG capsule TAKE 1 CAPSULE(0.4 MG) BY MOUTH DAILY 90 capsule 2     Vitamin D3 (CHOLECALCIFEROL) 125 MCG (5000 UT) tablet Take 125 mcg by mouth daily       polyethylene glycol-electrolytes (NULYTELY) 420 g solution MIX AND DRINK AS DIRECTED       Dulaglutide (TRULICITY) 4.5 MG/0.5ML SOPN Inject 4.5 mg Subcutaneous once a week (Patient not taking: Reported on 7/28/2023) 6 mL 0     No facility-administered medications prior to visit.         Answers submitted by the patient for this visit:  Symptoms you have experienced in the last 30 days (Submitted on 7/28/2023)  General Symptoms: No  Skin Symptoms: No  HENT Symptoms: No  EYE SYMPTOMS: No  HEART SYMPTOMS: No  LUNG SYMPTOMS: No  INTESTINAL SYMPTOMS: No  URINARY SYMPTOMS: No  REPRODUCTIVE SYMPTOMS: No  SKELETAL SYMPTOMS: No  BLOOD SYMPTOMS: No  NERVOUS SYSTEM SYMPTOMS: No  MENTAL HEALTH SYMPTOMS: No      Again, thank you for allowing me to participate in the care of your patient.        Sincerely,        Angela Garner NP

## 2023-07-28 NOTE — LETTER
September 22, 2023      Regis Brooke  [address] [place] [address]        Dear Regis,     We had been trying to reach you via telephone and [employer] message to schedule your diabetes follow up appointment, unfortunately it was unsuccessful.  Please call the clinic at [phone] to schedule a visit or via [employer].  Thank you      Sincerely,        Priti Charles MD

## 2023-08-05 ENCOUNTER — HEALTH MAINTENANCE LETTER (OUTPATIENT)
Age: 73
End: 2023-08-05

## 2023-08-13 DIAGNOSIS — E11.9 TYPE 2 DIABETES MELLITUS WITHOUT COMPLICATION, WITHOUT LONG-TERM CURRENT USE OF INSULIN (H): ICD-10-CM

## 2023-08-13 NOTE — TELEPHONE ENCOUNTER
"Routing refill request to provider for review/approval because:  Labs not current:  A1C    Last Written Prescription Date:  5/15/23  Last Fill Quantity: 90,  # refills: 0   Last office visit provider:   4/26/23    Requested Prescriptions   Pending Prescriptions Disp Refills    glipiZIDE (GLUCOTROL XL) 10 MG 24 hr tablet [Pharmacy Med Name: GLIPIZIDE ER 10MG TABLETS] 90 tablet 0     Sig: TAKE 1 TABLET(10 MG) BY MOUTH DAILY       Sulfonylurea Agents Failed - 8/13/2023  3:15 AM        Failed - Patient has documented A1c within the specified period of time.     If HgbA1C is 8 or greater, it needs to be on file within the past 3 months.  If less than 8, must be on file within the past 6 months.     Recent Labs   Lab Test 04/26/23  1024   A1C 9.9*             Passed - Medication is active on med list        Passed - Patient is age 18 or older        Passed - Patient has a recent creatinine (normal) within the past 12 mos.     Recent Labs   Lab Test 04/26/23  1024   CR 0.70       Ok to refill medication if creatinine is low          Passed - Recent (6 mo) or future (30 days) visit within the authorizing provider's specialty     Patient had office visit in the last 6 months or has a visit in the next 30 days with authorizing provider or within the authorizing provider's specialty.  See \"Patient Info\" tab in inbasket, or \"Choose Columns\" in Meds & Orders section of the refill encounter.                 Palma Galeano RN 08/13/23 6:25 AM  "

## 2023-08-14 RX ORDER — GLIPIZIDE 10 MG/1
TABLET, FILM COATED, EXTENDED RELEASE ORAL
Qty: 90 TABLET | Refills: 0 | Status: SHIPPED | OUTPATIENT
Start: 2023-08-14 | End: 2023-11-03

## 2023-08-26 NOTE — ANESTHESIA CARE TRANSFER NOTE
Patient: Regis Brooke    Procedure: Procedure(s):  CYSTOSCOPY, LEFT URETEROSCOPY, LASER LITHOTRIPSY ON LEFT,  LEFT RETROGRADE PYELOGRAM, LEFT URETERAL STENT PLACEMENT, RIGHT URETEROSCOPY, RIGHT RETROGRADE PYELOGRAM,  RIGHT URETERAL STENT PLACEMENT       Diagnosis: Calculus of ureter [N20.1]  Diagnosis Additional Information: No value filed.    Anesthesia Type:   General     Note:    Oropharynx: oropharynx clear of all foreign objects and spontaneously breathing  Level of Consciousness: drowsy  Oxygen Supplementation: face mask  Level of Supplemental Oxygen (L/min / FiO2): 6  Independent Airway: airway patency satisfactory and stable  Dentition: dentition unchanged  Vital Signs Stable: post-procedure vital signs reviewed and stable  Report to RN Given: handoff report given  Patient transferred to: PACU    Handoff Report: Identifed the Patient, Identified the Reponsible Provider, Reviewed the pertinent medical history, Discussed the surgical course, Reviewed Intra-OP anesthesia mangement and issues during anesthesia, Set expectations for post-procedure period and Allowed opportunity for questions and acknowledgement of understanding      Vitals:  Vitals Value Taken Time   BP     Temp     Pulse 81 08/16/22 0946   Resp 9 08/16/22 0946   SpO2 100 % 08/16/22 0946   Vitals shown include unvalidated device data.    Electronically Signed By: IBIS Fairchild CRNA  August 16, 2022  9:47 AM   present

## 2023-08-27 DIAGNOSIS — E11.9 TYPE 2 DIABETES MELLITUS WITHOUT COMPLICATION (H): ICD-10-CM

## 2023-08-27 NOTE — TELEPHONE ENCOUNTER
"Routing refill request to provider for review/approval because:  Labs not current:  A1c    Last Written Prescription Date:  2/26/2023  Last Fill Quantity: 360,  # refills: 1   Last office visit: 4/26/2023           Requested Prescriptions   Pending Prescriptions Disp Refills    metFORMIN (GLUCOPHAGE) 500 MG tablet [Pharmacy Med Name: METFORMIN 500MG TABLETS] 360 tablet 1     Sig: TAKE 2 TABLETS(1000 MG) BY MOUTH TWICE DAILY WITH MEALS       Biguanide Agents Failed - 8/27/2023  3:15 AM        Failed - Patient has documented A1c within the specified period of time.     If HgbA1C is 8 or greater, it needs to be on file within the past 3 months.  If less than 8, must be on file within the past 6 months.     Recent Labs   Lab Test 04/26/23  1024   A1C 9.9*             Passed - Patient is age 10 or older        Passed - Patient's CR is NOT>1.4 OR Patient's EGFR is NOT<45 within past 12 mos.     Recent Labs   Lab Test 04/26/23  1024 07/21/21  1517 12/18/20  1421   GFRESTIMATED >90   < > >60   GFRESTBLACK  --   --  >60    < > = values in this interval not displayed.       Recent Labs   Lab Test 04/26/23  1024   CR 0.70             Passed - Patient does NOT have a diagnosis of CHF.        Passed - Medication is active on med list        Passed - Recent (6 mo) or future (30 days) visit within the authorizing provider's specialty     Patient had office visit in the last 6 months or has a visit in the next 30 days with authorizing provider or within the authorizing provider's specialty.  See \"Patient Info\" tab in inbasket, or \"Choose Columns\" in Meds & Orders section of the refill encounter.                 Carrol Boyd RN 08/27/23 4:22 AM  "

## 2023-09-01 NOTE — TELEPHONE ENCOUNTER
Patient Quality Outreach    Patient is due for the following:   Diabetes -  A1C    Next Steps:   [employer] message sent    Type of outreach:    Sent MyChart message.    Next Steps:  Reach out within 90 days via [employer].    Max number of attempts reached: No. Will try again in 90 days if patient still on fail list.    Questions for provider review:    None           Tomás Oneal MA  Chart routed to Self.       Quality 226: Preventive Care And Screening: Tobacco Use: Screening And Cessation Intervention: Tobacco Screening not Performed for Medical Reasons Detail Level: Detailed Quality 130: Documentation Of Current Medications In The Medical Record: Current Medications Documented Quality 431: Preventive Care And Screening: Unhealthy Alcohol Use - Screening: Patient screened for unhealthy alcohol use using a single question and scores less than 2 times per year

## 2023-09-22 NOTE — TELEPHONE ENCOUNTER
Patient Quality Outreach    Patient is due for the following:   Diabetes -  A1C and Diabetic Follow-Up Visit  Hypertension -  BP check    Next Steps:   3rd attempts, letter sent out to patient home address    Type of outreach:    Sent letter.    Next Steps:  Reach out within 90 days via Letter.    Max number of attempts reached: Yes. Will try again in 90 days if patient still on fail list.    Questions for provider review:    None           Tomás Oneal MA  Chart routed to Self.

## 2023-10-23 ENCOUNTER — LAB (OUTPATIENT)
Dept: LAB | Facility: CLINIC | Age: 73
End: 2023-10-23
Payer: COMMERCIAL

## 2023-10-23 DIAGNOSIS — E11.9 TYPE 2 DIABETES MELLITUS WITHOUT COMPLICATION, UNSPECIFIED WHETHER LONG TERM INSULIN USE (H): ICD-10-CM

## 2023-10-23 LAB
ANION GAP SERPL CALCULATED.3IONS-SCNC: 15 MMOL/L (ref 7–15)
BUN SERPL-MCNC: 16.6 MG/DL (ref 8–23)
CALCIUM SERPL-MCNC: 9.6 MG/DL (ref 8.8–10.2)
CHLORIDE SERPL-SCNC: 103 MMOL/L (ref 98–107)
CREAT SERPL-MCNC: 0.82 MG/DL (ref 0.67–1.17)
DEPRECATED HCO3 PLAS-SCNC: 24 MMOL/L (ref 22–29)
EGFRCR SERPLBLD CKD-EPI 2021: >90 ML/MIN/1.73M2
GLUCOSE SERPL-MCNC: 110 MG/DL (ref 70–99)
HBA1C MFR BLD: 6.5 % (ref 0–5.6)
POTASSIUM SERPL-SCNC: 4.7 MMOL/L (ref 3.4–5.3)
SODIUM SERPL-SCNC: 142 MMOL/L (ref 135–145)

## 2023-10-23 PROCEDURE — 36415 COLL VENOUS BLD VENIPUNCTURE: CPT

## 2023-10-23 PROCEDURE — 80048 BASIC METABOLIC PNL TOTAL CA: CPT

## 2023-10-23 PROCEDURE — 83036 HEMOGLOBIN GLYCOSYLATED A1C: CPT

## 2023-11-03 ENCOUNTER — OFFICE VISIT (OUTPATIENT)
Dept: ENDOCRINOLOGY | Facility: CLINIC | Age: 73
End: 2023-11-03
Payer: COMMERCIAL

## 2023-11-03 VITALS
HEART RATE: 107 BPM | BODY MASS INDEX: 31.37 KG/M2 | WEIGHT: 210.6 LBS | SYSTOLIC BLOOD PRESSURE: 153 MMHG | DIASTOLIC BLOOD PRESSURE: 74 MMHG | OXYGEN SATURATION: 99 %

## 2023-11-03 DIAGNOSIS — E11.9 TYPE 2 DIABETES MELLITUS WITHOUT COMPLICATION, WITHOUT LONG-TERM CURRENT USE OF INSULIN (H): Primary | ICD-10-CM

## 2023-11-03 DIAGNOSIS — E11.9 TYPE 2 DIABETES MELLITUS WITHOUT COMPLICATION, WITHOUT LONG-TERM CURRENT USE OF INSULIN (H): ICD-10-CM

## 2023-11-03 PROCEDURE — 99214 OFFICE O/P EST MOD 30 MIN: CPT | Performed by: NURSE PRACTITIONER

## 2023-11-03 RX ORDER — GLIPIZIDE 10 MG/1
TABLET, FILM COATED, EXTENDED RELEASE ORAL
Qty: 90 TABLET | Refills: 0 | Status: SHIPPED | OUTPATIENT
Start: 2023-11-03 | End: 2024-02-01

## 2023-11-03 NOTE — LETTER
11/3/2023         RE: Regis Brooke  [address] [place] [address]        Dear Colleague,    Thank you for referring your patient, Regis Brooke, to the [facility] [place]. Please see a copy of my visit note below.    [facility] ENDOCRINOLOGY    Diabetes Note 11/3/2023    Regis Brooke, 1950, [id number]          Reason for visit      1. Type 2 diabetes mellitus without complication, without long-term current use of insulin (H)        HPI     Regis Brooke is a very pleasant 73 year old old male who presents for follow up.  SUMMARY:    Chet is seen today in follow-up for DM 2. His current A1c is 6.5 and quite remarkably better than his previous of 9.9. The addition of Jardiance has done the trick.     It is expensive for them, however they are making the effort. He has also made some adjustments to his diet, and isn't eating quite as much, nor quite as much CHO.  Morning BG are around 130.     In addition to the Jardiance 10 mg daily, he is also taking Metformin 1000 mg BID, and Glipizide XL, 10 mg daily.     Renal function remains excellent.        Blood glucose data:      Past Medical History     Patient Active Problem List   Diagnosis     Hypercholesterolemia     Hypertension     Calculus of kidney and ureter     Type 2 diabetes mellitus without complication (H)     History of primary malignant neoplasm of kidney     Vitamin D Deficiency     Preventative health care     Wears hearing aid - bilateral     Pain in both hands     Cognitive deficits     Microcytic anemia     Malignant neoplasm of ascending colon (H)     Colon cancer (H)     Diverticular disease of colon     Bilateral impacted cerumen     Benign prostatic hyperplasia with lower urinary tract symptoms, symptom details unspecified     Class 1 obesity due to excess calories with body mass index (BMI) of 33.0 to 33.9 in adult     RLQ abdominal mass     Acute left-sided low back pain with left-sided sciatica      Neuropathic pain, leg, left     Encounter for Medicare annual wellness exam        Family History       family history is not on file.    Social History      reports that he has quit smoking. He has never used smokeless tobacco. He reports current alcohol use of about 1.0 standard drink of alcohol per week. He reports that he does not use drugs.      Review of Systems     Patient has no polyuria or polydipsia, no chest pain, dyspnea or TIA's, no numbness, tingling or pain in extremities  Remainder negative except as noted in HPI.    Vital Signs     BP (!) 153/74 (BP Location: Left arm, Patient Position: Sitting, Cuff Size: Adult Large)   Pulse 107   Wt 95.5 kg (210 lb 9.6 oz)   SpO2 99%   BMI 31.37 kg/m    Wt Readings from Last 3 Encounters:   11/03/23 95.5 kg (210 lb 9.6 oz)   07/28/23 101.6 kg (224 lb)   04/26/23 104.1 kg (229 lb 9.6 oz)       Physical Exam     Constitutional:  Well developed, Well nourished  HENT:  Normocephalic,   Neck: normal in appearance  Eyes:  PERRL, Conjunctiva pink  Respiratory:  No respiratory distress  Skin: No acanthosis nigricans, lipoatrophy or lipodystrophy  Neurologic:  Alert & oriented x 3, nonfocal  Psychiatric:  Affect, Mood, Insight appropriate        Assessment     1. Type 2 diabetes mellitus without complication, without long-term current use of insulin (H)        Plan     Chet will continue with his current medication regimen and will follow-up with me in 6 months.         Angela Garner NP  [facility]  11/3/2023  7:30 AM      Lab Results     Microalbumin Urine mg/dL   Date Value Ref Range Status   02/09/2022 2.85 (H) 0.00 - 1.99 mg/dL Final       Cholesterol   Date Value Ref Range Status   03/14/2023 128 <200 mg/dL Final     Direct Measure HDL   Date Value Ref Range Status   03/14/2023 58 >=40 mg/dL Final     Triglycerides   Date Value Ref Range Status   03/14/2023 76 <150 mg/dL Final             Current Medications     Outpatient Medications Prior to Visit    Medication Sig Dispense Refill     amLODIPine (NORVASC) 10 MG tablet TAKE 1 TABLET(10 MG) BY MOUTH DAILY AS DIRECTED 90 tablet 3     aspirin (ASA) 325 MG EC tablet Take 325 mg by mouth daily       atorvastatin (LIPITOR) 80 MG tablet TAKE 1 TABLET(80 MG) BY MOUTH DAILY 90 tablet 3     calcium carbonate (OS-EDER) 500 MG tablet Take 1 tablet by mouth daily       CRANBERRY EXTRACT (CRANBERRY ORAL) [CRANBERRY EXTRACT (CRANBERRY ORAL)] Take by mouth.       cyanocobalamin (VITAMIN B-12) 100 MCG tablet Take 100 mcg by mouth daily       empagliflozin (JARDIANCE) 10 MG TABS tablet Take 1 tablet (10 mg) by mouth daily 90 tablet 1     glipiZIDE (GLUCOTROL XL) 10 MG 24 hr tablet TAKE 1 TABLET(10 MG) BY MOUTH DAILY 90 tablet 0     lisinopril (ZESTRIL) 40 MG tablet Take 1 tablet (40 mg) by mouth daily 90 tablet 0     metFORMIN (GLUCOPHAGE) 500 MG tablet TAKE 2 TABLETS(1000 MG) BY MOUTH TWICE DAILY WITH MEALS 360 tablet 0     MULTIVITAMIN ORAL Take 1 tablet by mouth daily        ONETOUCH VERIO IQ test strip USE AND DISCARD 1 TEST     STRIP 3 TIMES DAILY AS     DIRECTED. 300 strip 3     polyethylene glycol-electrolytes (NULYTELY) 420 g solution MIX AND DRINK AS DIRECTED       tamsulosin (FLOMAX) 0.4 MG capsule TAKE 1 CAPSULE(0.4 MG) BY MOUTH DAILY 90 capsule 2     Vitamin D3 (CHOLECALCIFEROL) 125 MCG (5000 UT) tablet Take 125 mcg by mouth daily       No facility-administered medications prior to visit.                 11/3   133    11/2  122, 150    11/1  150, 132    10/31  112, 128    10/30  117    10/29  130    10/28  104, 124    10/27  96, 119, 163    10/26  133,113    10/25  115, 141, 206    10/24  137, 89, 184, 150      Again, thank you for allowing me to participate in the care of your patient.        Sincerely,        Angela Garner NP

## 2023-11-03 NOTE — PROGRESS NOTES
[facility] ENDOCRINOLOGY    Diabetes Note 11/3/2023    Regis Brooke, 1950, [id number]          Reason for visit      1. Type 2 diabetes mellitus without complication, without long-term current use of insulin (H)        HPI     Regis Brooke is a very pleasant 73 year old old male who presents for follow up.  SUMMARY:    Chet is seen today in follow-up for DM 2. His current A1c is 6.5 and quite remarkably better than his previous of 9.9. The addition of Jardiance has done the trick.     It is expensive for them, however they are making the effort. He has also made some adjustments to his diet, and isn't eating quite as much, nor quite as much CHO.  Morning BG are around 130.     In addition to the Jardiance 10 mg daily, he is also taking Metformin 1000 mg BID, and Glipizide XL, 10 mg daily.     Renal function remains excellent.        Blood glucose data:      Past Medical History     Patient Active Problem List   Diagnosis    Hypercholesterolemia    Hypertension    Calculus of kidney and ureter    Type 2 diabetes mellitus without complication (H)    History of primary malignant neoplasm of kidney    Vitamin D Deficiency    Preventative health care    Wears hearing aid - bilateral    Pain in both hands    Cognitive deficits    Microcytic anemia    Malignant neoplasm of ascending colon (H)    Colon cancer (H)    Diverticular disease of colon    Bilateral impacted cerumen    Benign prostatic hyperplasia with lower urinary tract symptoms, symptom details unspecified    Class 1 obesity due to excess calories with body mass index (BMI) of 33.0 to 33.9 in adult    RLQ abdominal mass    Acute left-sided low back pain with left-sided sciatica    Neuropathic pain, leg, left    Encounter for Medicare annual wellness exam        Family History       family history is not on file.    Social History      reports that he has quit smoking. He has never used smokeless tobacco. He reports current alcohol use of about 1.0  standard drink of alcohol per week. He reports that he does not use drugs.      Review of Systems     Patient has no polyuria or polydipsia, no chest pain, dyspnea or TIA's, no numbness, tingling or pain in extremities  Remainder negative except as noted in HPI.    Vital Signs     BP (!) 153/74 (BP Location: Left arm, Patient Position: Sitting, Cuff Size: Adult Large)   Pulse 107   Wt 95.5 kg (210 lb 9.6 oz)   SpO2 99%   BMI 31.37 kg/m    Wt Readings from Last 3 Encounters:   11/03/23 95.5 kg (210 lb 9.6 oz)   07/28/23 101.6 kg (224 lb)   04/26/23 104.1 kg (229 lb 9.6 oz)       Physical Exam     Constitutional:  Well developed, Well nourished  HENT:  Normocephalic,   Neck: normal in appearance  Eyes:  PERRL, Conjunctiva pink  Respiratory:  No respiratory distress  Skin: No acanthosis nigricans, lipoatrophy or lipodystrophy  Neurologic:  Alert & oriented x 3, nonfocal  Psychiatric:  Affect, Mood, Insight appropriate        Assessment     1. Type 2 diabetes mellitus without complication, without long-term current use of insulin (H)        Plan     Chet will continue with his current medication regimen and will follow-up with me in 6 months.         Angela Garner NP  [facility]  11/3/2023  7:30 AM      Lab Results     Microalbumin Urine mg/dL   Date Value Ref Range Status   02/09/2022 2.85 (H) 0.00 - 1.99 mg/dL Final       Cholesterol   Date Value Ref Range Status   03/14/2023 128 <200 mg/dL Final     Direct Measure HDL   Date Value Ref Range Status   03/14/2023 58 >=40 mg/dL Final     Triglycerides   Date Value Ref Range Status   03/14/2023 76 <150 mg/dL Final             Current Medications     Outpatient Medications Prior to Visit   Medication Sig Dispense Refill    amLODIPine (NORVASC) 10 MG tablet TAKE 1 TABLET(10 MG) BY MOUTH DAILY AS DIRECTED 90 tablet 3    aspirin (ASA) 325 MG EC tablet Take 325 mg by mouth daily      atorvastatin (LIPITOR) 80 MG tablet TAKE 1 TABLET(80 MG) BY MOUTH DAILY 90 tablet 3     calcium carbonate (OS-EDER) 500 MG tablet Take 1 tablet by mouth daily      CRANBERRY EXTRACT (CRANBERRY ORAL) [CRANBERRY EXTRACT (CRANBERRY ORAL)] Take by mouth.      cyanocobalamin (VITAMIN B-12) 100 MCG tablet Take 100 mcg by mouth daily      empagliflozin (JARDIANCE) 10 MG TABS tablet Take 1 tablet (10 mg) by mouth daily 90 tablet 1    glipiZIDE (GLUCOTROL XL) 10 MG 24 hr tablet TAKE 1 TABLET(10 MG) BY MOUTH DAILY 90 tablet 0    lisinopril (ZESTRIL) 40 MG tablet Take 1 tablet (40 mg) by mouth daily 90 tablet 0    metFORMIN (GLUCOPHAGE) 500 MG tablet TAKE 2 TABLETS(1000 MG) BY MOUTH TWICE DAILY WITH MEALS 360 tablet 0    MULTIVITAMIN ORAL Take 1 tablet by mouth daily       ONETOUCH VERIO IQ test strip USE AND DISCARD 1 TEST     STRIP 3 TIMES DAILY AS     DIRECTED. 300 strip 3    polyethylene glycol-electrolytes (NULYTELY) 420 g solution MIX AND DRINK AS DIRECTED      tamsulosin (FLOMAX) 0.4 MG capsule TAKE 1 CAPSULE(0.4 MG) BY MOUTH DAILY 90 capsule 2    Vitamin D3 (CHOLECALCIFEROL) 125 MCG (5000 UT) tablet Take 125 mcg by mouth daily       No facility-administered medications prior to visit.                 11/3   133    11/2  122, 150    11/1  150, 132    10/31  112, 128    10/30  117    10/29  130    10/28  104, 124    10/27  96, 119, 163    10/26  133,113    10/25  115, 141, 206    10/24  137, 89, 184, 150

## 2023-11-06 ENCOUNTER — PATIENT OUTREACH (OUTPATIENT)
Dept: GASTROENTEROLOGY | Facility: CLINIC | Age: 73
End: 2023-11-06
Payer: COMMERCIAL

## 2024-01-18 ENCOUNTER — TELEPHONE (OUTPATIENT)
Dept: FAMILY MEDICINE | Facility: CLINIC | Age: 74
End: 2024-01-18

## 2024-01-18 DIAGNOSIS — I10 ESSENTIAL HYPERTENSION: ICD-10-CM

## 2024-01-18 DIAGNOSIS — E11.9 TYPE 2 DIABETES MELLITUS WITHOUT COMPLICATION, UNSPECIFIED WHETHER LONG TERM INSULIN USE (H): ICD-10-CM

## 2024-01-18 RX ORDER — EMPAGLIFLOZIN 10 MG/1
10 TABLET, FILM COATED ORAL DAILY
Qty: 90 TABLET | Refills: 1 | Status: SHIPPED | OUTPATIENT
Start: 2024-01-18 | End: 2024-07-15

## 2024-01-18 NOTE — TELEPHONE ENCOUNTER
Requested Prescriptions   Pending Prescriptions Disp Refills    JARDIANCE 10 MG TABS tablet [Pharmacy Med Name: JARDIANCE 10MG TABLETS] 90 tablet 1     Sig: TAKE 1 TABLET(10 MG) BY MOUTH DAILY       Sodium Glucose Co-Transport Inhibitor Agents Failed - 1/18/2024 10:01 AM        Failed - Has GFR on file in past 12 months and most recent value is normal        Passed - Patient has documented A1c within the specified period of time.     If HgbA1C is 8 or greater, it needs to be on file within the past 3 months.  If less than 8, must be on file within the past 6 months.     Recent Labs   Lab Test 10/23/23  0818   A1C 6.5*             Passed - Medication is active on med list        Passed - Recent (6 mo) or future (90 days) visit within the authorizing provider's specialty     The patient must have completed an in-person or virtual visit within the past 6 months or has a future visit scheduled within the next 90 days with the authorizing provider s specialty.  Urgent care and e-visits do not quality as an office visit for this protocol.          Passed - Medication indicated for associated diagnosis     Medication is associated with one or more of the following diagnoses:     Diabetic nephropathy, With Albuminuria - Type 2 diabetes mellitus     Disorder of cardiovascular system; Prophylaxis - Type 2 diabetes mellitus     Type 2 diabetes mellitus    Disorder of cardiovascular system; Prophylaxis - Heart failure   Chronic kidney disease, (At risk of progression) to reduce the risk of sustained   estimated GFR decline, end-stage kidney disease, cardiovascular death,   and hospitalization for heart failure     Heart failure, (NYHA class II to IV, reduced ejection fraction) to reduce risk of  cardiovascular death and hospitalization           Passed - Patient is age 18 or older        Passed - Patient has documented normal Potassium within the last 12 mos.     Recent Labs   Lab Test 10/23/23  0818   POTASSIUM 4.7               GFR done 10/23/23 - >90

## 2024-01-18 NOTE — TELEPHONE ENCOUNTER
Patient Quality Outreach    Patient is due for the following:   Hypertension -  BP check    Next Steps:   No follow up needed at this time.    Type of outreach:    Sent Getourguidet message.      Questions for provider review:    None           Flora Matthews MA

## 2024-01-22 RX ORDER — LISINOPRIL 40 MG/1
40 TABLET ORAL DAILY
Qty: 90 TABLET | Refills: 0 | Status: SHIPPED | OUTPATIENT
Start: 2024-01-22 | End: 2024-04-10

## 2024-01-29 DIAGNOSIS — E11.9 TYPE 2 DIABETES MELLITUS WITHOUT COMPLICATION, UNSPECIFIED WHETHER LONG TERM INSULIN USE (H): ICD-10-CM

## 2024-01-31 DIAGNOSIS — E11.9 TYPE 2 DIABETES MELLITUS WITHOUT COMPLICATION, WITHOUT LONG-TERM CURRENT USE OF INSULIN (H): ICD-10-CM

## 2024-02-01 RX ORDER — GLIPIZIDE 10 MG/1
TABLET, FILM COATED, EXTENDED RELEASE ORAL
Qty: 90 TABLET | Refills: 0 | Status: SHIPPED | OUTPATIENT
Start: 2024-02-01 | End: 2024-05-03

## 2024-02-10 DIAGNOSIS — N40.1 BENIGN PROSTATIC HYPERPLASIA WITH LOWER URINARY TRACT SYMPTOMS, SYMPTOM DETAILS UNSPECIFIED: ICD-10-CM

## 2024-02-12 RX ORDER — TAMSULOSIN HYDROCHLORIDE 0.4 MG/1
CAPSULE ORAL
Qty: 90 CAPSULE | Refills: 2 | Status: SHIPPED | OUTPATIENT
Start: 2024-02-12 | End: 2024-04-10

## 2024-02-13 ENCOUNTER — PATIENT OUTREACH (OUTPATIENT)
Dept: CARE COORDINATION | Facility: CLINIC | Age: 74
End: 2024-02-13
Payer: COMMERCIAL

## 2024-02-27 ENCOUNTER — PATIENT OUTREACH (OUTPATIENT)
Dept: CARE COORDINATION | Facility: CLINIC | Age: 74
End: 2024-02-27
Payer: COMMERCIAL

## 2024-03-02 ENCOUNTER — HEALTH MAINTENANCE LETTER (OUTPATIENT)
Age: 74
End: 2024-03-02

## 2024-04-04 SDOH — HEALTH STABILITY: PHYSICAL HEALTH: ON AVERAGE, HOW MANY MINUTES DO YOU ENGAGE IN EXERCISE AT THIS LEVEL?: 10 MIN

## 2024-04-04 SDOH — HEALTH STABILITY: PHYSICAL HEALTH: ON AVERAGE, HOW MANY DAYS PER WEEK DO YOU ENGAGE IN MODERATE TO STRENUOUS EXERCISE (LIKE A BRISK WALK)?: 1 DAY

## 2024-04-04 ASSESSMENT — SOCIAL DETERMINANTS OF HEALTH (SDOH): HOW OFTEN DO YOU GET TOGETHER WITH FRIENDS OR RELATIVES?: ONCE A WEEK

## 2024-04-10 ENCOUNTER — OFFICE VISIT (OUTPATIENT)
Dept: FAMILY MEDICINE | Facility: CLINIC | Age: 74
End: 2024-04-10
Payer: COMMERCIAL

## 2024-04-10 VITALS
OXYGEN SATURATION: 97 % | SYSTOLIC BLOOD PRESSURE: 140 MMHG | BODY MASS INDEX: 32.16 KG/M2 | WEIGHT: 212.2 LBS | HEART RATE: 96 BPM | RESPIRATION RATE: 16 BRPM | DIASTOLIC BLOOD PRESSURE: 78 MMHG | HEIGHT: 68 IN | TEMPERATURE: 98.5 F

## 2024-04-10 DIAGNOSIS — Z00.00 HEALTH CARE MAINTENANCE: ICD-10-CM

## 2024-04-10 DIAGNOSIS — Z00.00 ENCOUNTER FOR MEDICARE ANNUAL WELLNESS EXAM: ICD-10-CM

## 2024-04-10 DIAGNOSIS — E66.09 CLASS 1 OBESITY DUE TO EXCESS CALORIES WITH SERIOUS COMORBIDITY AND BODY MASS INDEX (BMI) OF 32.0 TO 32.9 IN ADULT: ICD-10-CM

## 2024-04-10 DIAGNOSIS — I10 ESSENTIAL HYPERTENSION: ICD-10-CM

## 2024-04-10 DIAGNOSIS — E78.00 HYPERCHOLESTEROLEMIA: ICD-10-CM

## 2024-04-10 DIAGNOSIS — C18.9 MALIGNANT NEOPLASM OF COLON, UNSPECIFIED PART OF COLON (H): ICD-10-CM

## 2024-04-10 DIAGNOSIS — Z85.528 HISTORY OF PRIMARY MALIGNANT NEOPLASM OF KIDNEY: ICD-10-CM

## 2024-04-10 DIAGNOSIS — N20.2 CALCULUS OF KIDNEY AND URETER: ICD-10-CM

## 2024-04-10 DIAGNOSIS — Z77.22 SECOND HAND SMOKE EXPOSURE: ICD-10-CM

## 2024-04-10 DIAGNOSIS — Z13.0 SCREENING, ANEMIA, DEFICIENCY, IRON: ICD-10-CM

## 2024-04-10 DIAGNOSIS — Z13.228 SCREENING FOR METABOLIC DISORDER: ICD-10-CM

## 2024-04-10 DIAGNOSIS — E78.2 MIXED HYPERLIPIDEMIA: ICD-10-CM

## 2024-04-10 DIAGNOSIS — E11.9 TYPE 2 DIABETES MELLITUS WITHOUT COMPLICATION, WITHOUT LONG-TERM CURRENT USE OF INSULIN (H): Primary | ICD-10-CM

## 2024-04-10 DIAGNOSIS — E55.9 VITAMIN D DEFICIENCY: ICD-10-CM

## 2024-04-10 DIAGNOSIS — E66.811 CLASS 1 OBESITY DUE TO EXCESS CALORIES WITH SERIOUS COMORBIDITY AND BODY MASS INDEX (BMI) OF 32.0 TO 32.9 IN ADULT: ICD-10-CM

## 2024-04-10 DIAGNOSIS — C18.2 MALIGNANT NEOPLASM OF ASCENDING COLON (H): ICD-10-CM

## 2024-04-10 DIAGNOSIS — N40.1 BENIGN PROSTATIC HYPERPLASIA WITH LOWER URINARY TRACT SYMPTOMS, SYMPTOM DETAILS UNSPECIFIED: ICD-10-CM

## 2024-04-10 DIAGNOSIS — Z97.4 WEARS HEARING AID: ICD-10-CM

## 2024-04-10 DIAGNOSIS — M54.2 NECK PAIN: ICD-10-CM

## 2024-04-10 PROBLEM — H61.23 BILATERAL IMPACTED CERUMEN: Status: RESOLVED | Noted: 2021-08-11 | Resolved: 2024-04-10

## 2024-04-10 LAB
ALBUMIN SERPL BCG-MCNC: 4.7 G/DL (ref 3.5–5.2)
ALP SERPL-CCNC: 75 U/L (ref 40–150)
ALT SERPL W P-5'-P-CCNC: 24 U/L (ref 0–70)
ANION GAP SERPL CALCULATED.3IONS-SCNC: 13 MMOL/L (ref 7–15)
AST SERPL W P-5'-P-CCNC: 24 U/L (ref 0–45)
BILIRUB SERPL-MCNC: 0.6 MG/DL
BUN SERPL-MCNC: 17.8 MG/DL (ref 8–23)
CALCIUM SERPL-MCNC: 9.7 MG/DL (ref 8.8–10.2)
CHLORIDE SERPL-SCNC: 103 MMOL/L (ref 98–107)
CHOLEST SERPL-MCNC: 141 MG/DL
CREAT SERPL-MCNC: 0.68 MG/DL (ref 0.67–1.17)
CREAT UR-MCNC: 65.5 MG/DL
DEPRECATED HCO3 PLAS-SCNC: 25 MMOL/L (ref 22–29)
EGFRCR SERPLBLD CKD-EPI 2021: >90 ML/MIN/1.73M2
ERYTHROCYTE [DISTWIDTH] IN BLOOD BY AUTOMATED COUNT: 12.8 % (ref 10–15)
GLUCOSE SERPL-MCNC: 111 MG/DL (ref 70–99)
HBA1C MFR BLD: 6.7 % (ref 0–5.6)
HCT VFR BLD AUTO: 46.5 % (ref 40–53)
HDLC SERPL-MCNC: 63 MG/DL
HGB BLD-MCNC: 15.9 G/DL (ref 13.3–17.7)
HOLD SPECIMEN: NORMAL
HOLD SPECIMEN: NORMAL
LDLC SERPL CALC-MCNC: 60 MG/DL
MCH RBC QN AUTO: 31.9 PG (ref 26.5–33)
MCHC RBC AUTO-ENTMCNC: 34.2 G/DL (ref 31.5–36.5)
MCV RBC AUTO: 93 FL (ref 78–100)
MICROALBUMIN UR-MCNC: 76.2 MG/L
MICROALBUMIN/CREAT UR: 116.34 MG/G CR (ref 0–17)
NONHDLC SERPL-MCNC: 78 MG/DL
PLATELET # BLD AUTO: 190 10E3/UL (ref 150–450)
POTASSIUM SERPL-SCNC: 4.1 MMOL/L (ref 3.4–5.3)
PROT SERPL-MCNC: 7.5 G/DL (ref 6.4–8.3)
RBC # BLD AUTO: 4.99 10E6/UL (ref 4.4–5.9)
SODIUM SERPL-SCNC: 141 MMOL/L (ref 135–145)
TRIGL SERPL-MCNC: 89 MG/DL
VIT D+METAB SERPL-MCNC: 64 NG/ML (ref 20–50)
WBC # BLD AUTO: 8.2 10E3/UL (ref 4–11)

## 2024-04-10 PROCEDURE — G0439 PPPS, SUBSEQ VISIT: HCPCS | Performed by: FAMILY MEDICINE

## 2024-04-10 PROCEDURE — 36415 COLL VENOUS BLD VENIPUNCTURE: CPT | Performed by: FAMILY MEDICINE

## 2024-04-10 PROCEDURE — 82306 VITAMIN D 25 HYDROXY: CPT | Performed by: FAMILY MEDICINE

## 2024-04-10 PROCEDURE — 99214 OFFICE O/P EST MOD 30 MIN: CPT | Mod: 25 | Performed by: FAMILY MEDICINE

## 2024-04-10 PROCEDURE — 80053 COMPREHEN METABOLIC PANEL: CPT | Performed by: FAMILY MEDICINE

## 2024-04-10 PROCEDURE — 90662 IIV NO PRSV INCREASED AG IM: CPT | Performed by: FAMILY MEDICINE

## 2024-04-10 PROCEDURE — 85027 COMPLETE CBC AUTOMATED: CPT | Performed by: FAMILY MEDICINE

## 2024-04-10 PROCEDURE — 80061 LIPID PANEL: CPT | Performed by: FAMILY MEDICINE

## 2024-04-10 PROCEDURE — 82570 ASSAY OF URINE CREATININE: CPT | Performed by: FAMILY MEDICINE

## 2024-04-10 PROCEDURE — 83036 HEMOGLOBIN GLYCOSYLATED A1C: CPT | Performed by: FAMILY MEDICINE

## 2024-04-10 PROCEDURE — 90480 ADMN SARSCOV2 VAC 1/ONLY CMP: CPT | Performed by: FAMILY MEDICINE

## 2024-04-10 PROCEDURE — 91320 SARSCV2 VAC 30MCG TRS-SUC IM: CPT | Performed by: FAMILY MEDICINE

## 2024-04-10 PROCEDURE — G0008 ADMIN INFLUENZA VIRUS VAC: HCPCS | Performed by: FAMILY MEDICINE

## 2024-04-10 PROCEDURE — 82043 UR ALBUMIN QUANTITATIVE: CPT | Performed by: FAMILY MEDICINE

## 2024-04-10 RX ORDER — AMLODIPINE BESYLATE 10 MG/1
10 TABLET ORAL DAILY
Qty: 90 TABLET | Refills: 3 | Status: SHIPPED | OUTPATIENT
Start: 2024-04-10

## 2024-04-10 RX ORDER — TAMSULOSIN HYDROCHLORIDE 0.4 MG/1
0.4 CAPSULE ORAL DAILY
Qty: 90 CAPSULE | Refills: 3 | Status: SHIPPED | OUTPATIENT
Start: 2024-04-10

## 2024-04-10 RX ORDER — ATORVASTATIN CALCIUM 80 MG/1
80 TABLET, FILM COATED ORAL DAILY
Qty: 90 TABLET | Refills: 3 | Status: SHIPPED | OUTPATIENT
Start: 2024-04-10

## 2024-04-10 RX ORDER — LISINOPRIL 40 MG/1
40 TABLET ORAL DAILY
Qty: 90 TABLET | Refills: 3 | Status: SHIPPED | OUTPATIENT
Start: 2024-04-10

## 2024-04-10 NOTE — ASSESSMENT & PLAN NOTE
improvingObesity with bmi 32.50 and chronic comorbid weight related hx colon cancer, hx kidney cancer, kidney stones, diabetes.     Healthy lifestyle discussed.

## 2024-04-10 NOTE — ASSESSMENT & PLAN NOTE
Diabetes managed by endocrinology Angela Garner NP  - A1c done today is 6.7 excellent control.  Will defer prescriptions of diabetic medications to Angela  Currently it appears he is on Glucotrol 10 mg daily, Jardiance 10 mg p.o. daily, and metformin 1000 mg p.o. twice daily with statin Lipitor 80 mg p.o. daily, he takes aspirin and ACE inhibitor lisinopril 40 mg p.o. daily

## 2024-04-10 NOTE — ASSESSMENT & PLAN NOTE
Second hand smoke exposure - all his life, discussed lung cancer screen. He desires this. Risks and benefits reviewed.  He will check to see if insurance covers, if not could consult with pulm through [facility] to see what they recommend.

## 2024-04-10 NOTE — PATIENT INSTRUCTIONS
Preventive Care Advice   This is general advice given by our system to help you stay healthy. However, your care team may have specific advice just for you. Please talk to your care team about your preventive care needs.  Nutrition  Eat 5 or more servings of fruits and vegetables each day.  Try wheat bread, brown rice and whole grain pasta (instead of white bread, rice, and pasta).  Get enough calcium and vitamin D. Check the label on foods and aim for 100% of the RDA (recommended daily allowance).  Lifestyle  Exercise at least 150 minutes each week   (30 minutes a day, 5 days a week).  Do muscle strengthening activities 2 days a week. These help control your weight and prevent disease.  No smoking.  Wear sunscreen to prevent skin cancer.  Have a dental exam and cleaning every 6 months.  Yearly exams  See your health care team every year to talk about:  Any changes in your health.  Any medicines your care team has prescribed.  Preventive care, family planning, and ways to prevent chronic diseases.  Shots (vaccines)   HPV shots (up to age 26), if you've never had them before.  Hepatitis B shots (up to age 59), if you've never had them before.  COVID-19 shot: Get this shot when it's due.  Flu shot: Get a flu shot every year.  Tetanus shot: Get a tetanus shot every 10 years.  Pneumococcal, hepatitis A, and RSV shots: Ask your care team if you need these based on your risk.  Shingles shot (for age 50 and up).  General health tests  Diabetes screening:  Starting at age 35, Get screened for diabetes at least every 3 years.  If you are younger than age 35, ask your care team if you should be screened for diabetes.  Cholesterol test: At age 39, start having a cholesterol test every 5 years, or more often if advised.  Bone density scan (DEXA): At age 50, ask your care team if you should have this scan for osteoporosis (brittle bones).  Hepatitis C: Get tested at least once in your life.  STIs (sexually transmitted  infections)  Before age 24: Ask your care team if you should be screened for STIs.  After age 24: Get screened for STIs if you're at risk. You are at risk for STIs (including HIV) if:  You are sexually active with more than one person.  You don't use condoms every time.  You or a partner was diagnosed with a sexually transmitted infection.  If you are at risk for HIV, ask about PrEP medicine to prevent HIV.  Get tested for HIV at least once in your life, whether you are at risk for HIV or not.  Cancer screening tests  Cervical cancer screening: If you have a cervix, begin getting regular cervical cancer screening tests at age 21. Most people who have regular screenings with normal results can stop after age 65. Talk about this with your provider.  Breast cancer scan (mammogram): If you've ever had breasts, begin having regular mammograms starting at age 40. This is a scan to check for breast cancer.  Colon cancer screening: It is important to start screening for colon cancer at age 45.  Have a colonoscopy test every 10 years (or more often if you're at risk) Or, ask your provider about stool tests like a FIT test every year or Cologuard test every 3 years.  To learn more about your testing options, visit: https://www.[employer]/114701.pdf.  For help making a decision, visit: https://bit.ly/di90027.  Prostate cancer screening test: If you have a prostate and are age 55 to 69, ask your provider if you would benefit from a yearly prostate cancer screening test.  Lung cancer screening: If you are a current or former smoker age 50 to 80, ask your care team if ongoing lung cancer screenings are right for you.  For informational purposes only. Not to replace the advice of your health care provider. Copyright   2023 [employer]. All rights reserved. Clinically reviewed by the [employer] [facility] Transitions Program. [employer] [id number] - REV 01/24.    Hearing Loss: Care Instructions  Overview     Hearing loss is a  sudden or slow decrease in how well you hear. It can range from slight to profound. Permanent hearing loss can occur with aging. It also can happen when you are exposed long-term to loud noise. Examples include listening to loud music, riding motorcycles, or being around other loud machines.  Hearing loss can affect your work and home life. It can make you feel lonely or depressed. You may feel that you have lost your independence. But hearing aids and other devices can help you hear better and feel connected to others.  Follow-up care is a key part of your treatment and safety. Be sure to make and go to all appointments, and call your doctor if you are having problems. It's also a good idea to know your test results and keep a list of the medicines you take.  How can you care for yourself at home?  Avoid loud noises whenever possible. This helps keep your hearing from getting worse.  Always wear hearing protection around loud noises.  Wear a hearing aid as directed.  A professional can help you pick a hearing aid that will work best for you.  You can also get hearing aids over the counter for mild to moderate hearing loss.  Have hearing tests as your doctor suggests. They can show whether your hearing has changed. Your hearing aid may need to be adjusted.  Use other devices as needed. These may include:  Telephone amplifiers and hearing aids that can connect to a television, stereo, radio, or microphone.  Devices that use lights or vibrations. These alert you to the doorbell, a ringing telephone, or a baby monitor.  Television closed-captioning. This shows the words at the bottom of the screen. Most new TVs can do this.  TTY (text telephone). This lets you type messages back and forth on the telephone instead of talking or listening. These devices are also called TDD. When messages are typed on the keyboard, they are sent over the phone line to a receiving TTY. The message is shown on a monitor.  Use text  "messaging, social media, and email if it is hard for you to communicate by telephone.  Try to learn a listening technique called speechreading. It is not lipreading. You pay attention to people's gestures, expressions, posture, and tone of voice. These clues can help you understand what a person is saying. Face the person you are talking to, and have them face you. Make sure the lighting is good. You need to see the other person's face clearly.  Think about counseling if you need help to adjust to your hearing loss.  When should you call for help?  Watch closely for changes in your health, and be sure to contact your doctor if:    You think your hearing is getting worse.     You have new symptoms, such as dizziness or nausea.   Where can you learn more?  Go to https://www.[employer].net/patiented  Enter R798 in the search box to learn more about \"Hearing Loss: Care Instructions.\"  Current as of: September 27, 2023               Content Version: 14.0    0837-3079 [employer].   Care instructions adapted under license by your healthcare professional. If you have questions about a medical condition or this instruction, always ask your healthcare professional. [employer] disclaims any warranty or liability for your use of this information.      "

## 2024-04-10 NOTE — ASSESSMENT & PLAN NOTE
Hypertension - current elevation of systolic blood pressure, diastolic is normal.  Will continue Norvasc 10 mg p.o. daily as well as lisinopril 40 mg p.o. daily.  CMP is ordered today to monitor.

## 2024-04-10 NOTE — ASSESSMENT & PLAN NOTE
Vaccines: flu, covid and rsv  Colonoscopy: due 2025   Std testing desired:  offered  Osteoporosis prevention discussed.  vitamin d levels ordered. Recommend daily calcium and vitamin d intake to keep good bone health. Recommend weight bearing exercise, no tobacco, and limit alcohol  dexa - declined no fam hx  Recommend sunscreen, exercise, & healthy diet.  Offered cbc, cmp, lipids and asked what other testing he  desires today  I have had an Advance Directives discussion with the patient.   Body mass index is 32.5 kg/m .   mychart active.

## 2024-04-10 NOTE — ASSESSMENT & PLAN NOTE
Neck pain - tense shoulder and neck muscles - makes it hard to sleep.   Physical therapy and spine specialist recommended.

## 2024-04-10 NOTE — PROGRESS NOTES
Preventive Care Visit  [facility]  Priti Charles MD, Family Medicine  Apr 10, 2024    Assessment & Plan   Problem List Items Addressed This Visit          Nervous and Auditory    Neck pain     Neck pain - tense shoulder and neck muscles - makes it hard to sleep.   Physical therapy and spine specialist recommended.          Relevant Orders    Spine  Referral       Digestive    Vitamin D deficiency     Vit d deficiency, recheck vit d and titrate meds.         Relevant Orders    Vitamin D Deficiency    Malignant neoplasm of ascending colon (H)     Colon cancer - saw Dr. Pérez in past and he declined chemo or radiation and since then they have not followed up, offered referral back to oncology but they declined for now wanting look back at their notes before following up.         Colon cancer (H)     Colonoscopy in 202 repeat in 2025 (sooner if sx)         Class 1 obesity due to excess calories with serious comorbidity and body mass index (BMI) of 32.0 to 32.9 in adult     improvingObesity with bmi 32.50 and chronic comorbid weight related hx colon cancer, hx kidney cancer, kidney stones, diabetes.     Healthy lifestyle discussed.             Endocrine    Hypercholesterolemia     Updated fasting labs today.  Current medications include Lipitor 80 mg daily.            Relevant Medications    atorvastatin (LIPITOR) 80 MG tablet    Other Relevant Orders    Lipid Profile    Type 2 diabetes mellitus without complication (H) - Primary     Diabetes managed by endocrinology Angela Garner NP  - A1c done today is 6.7 excellent control.  Will defer prescriptions of diabetic medications to Angela  Currently it appears he is on Glucotrol 10 mg daily, Jardiance 10 mg p.o. daily, and metformin 1000 mg p.o. twice daily with statin Lipitor 80 mg p.o. daily, he takes aspirin and ACE inhibitor lisinopril 40 mg p.o. daily         Relevant Orders    Hemoglobin A1c (Completed)    Albumin Random Urine  Quantitative with Creat Ratio    Adult Eye  Referral       Circulatory    Hypertension     Hypertension - current elevation of systolic blood pressure, diastolic is normal.  Will continue Norvasc 10 mg p.o. daily as well as lisinopril 40 mg p.o. daily.  CMP is ordered today to monitor.         Relevant Medications    amLODIPine (NORVASC) 10 MG tablet    lisinopril (ZESTRIL) 40 MG tablet       Urinary    Calculus of kidney and ureter     Hx stones. No current problem. He says he has appt with dr. Zuniga coming up         Benign prostatic hyperplasia with lower urinary tract symptoms, symptom details unspecified     Remains on Flomax 0.4 mg daily.  Reports good control symptoms. Refilled.   In past Dr. Zuniga had mentioned consideration of TURP,  - He says he has appt with dr. Zuniga coming up            Relevant Medications    tamsulosin (FLOMAX) 0.4 MG capsule       Behavioral    Second hand smoke exposure     Second hand smoke exposure - all his life, discussed lung cancer screen. He desires this. Risks and benefits reviewed.  He will check to see if insurance covers, if not could consult with pulm through [facility] to see what they recommend.             Other    History of primary malignant neoplasm of kidney     Managed Dr. Lopez in remote past.   Follow up recommended as he stated in 2016 he was supposed to follow up in two years and now it is unclear if that ever happened.          Relevant Orders    Adult Nephrology  Referral    Health care maintenance     Vaccines: flu, covid and rsv  Colonoscopy: due 2025   Std testing desired:  offered  Osteoporosis prevention discussed.  vitamin d levels ordered. Recommend daily calcium and vitamin d intake to keep good bone health. Recommend weight bearing exercise, no tobacco, and limit alcohol  dexa - declined no fam hx  Recommend sunscreen, exercise, & healthy diet.  Offered cbc, cmp, lipids and asked what other testing he  desires today  I have had an  "Advance Directives discussion with the patient.   Body mass index is 32.5 kg/m .   mychart active.          Wears hearing aid - bilateral     Wears hearing aids.         Encounter for Medicare annual wellness exam     Other Visit Diagnoses       Screening for metabolic disorder        Relevant Orders    Comprehensive metabolic panel (BMP + Alb, Alk Phos, ALT, AST, Total. Bili, TP)    Screening, anemia, deficiency, iron        Relevant Orders    CBC with platelets (Completed)    Mixed hyperlipidemia        Relevant Medications    atorvastatin (LIPITOR) 80 MG tablet             Patient has been advised of split billing requirements and indicates understanding: Yes     BMI  Estimated body mass index is 32.5 kg/m  as calculated from the following:    Height as of this encounter: 1.721 m (5' 7.75\").    Weight as of this encounter: 96.3 kg (212 lb 3.2 oz).   Weight management plan: Discussed healthy diet and exercise guidelines    Counseling  Appropriate preventive services were discussed with this patient, including applicable screening as appropriate for fall prevention, nutrition, physical activity, Tobacco-use cessation, weight loss and cognition.  Checklist reviewing preventive services available has been given to the patient.  Reviewed patient's diet, addressing concerns and/or questions.   He is at risk for lack of exercise and has been provided with information to increase physical activity for the benefit of his well-being.   The patient was instructed to see the dentist every 6 months.   The patient was provided with written information regarding signs of hearing loss.       Nevin Rosenberg is a 73 year old, presenting for the following:  Wellness Visit (Non-fasting)        4/10/2024     9:28 AM   Additional Questions   Roomed by xl   Accompanied by wife       Health Care Directive  Patient does not have a Health Care Directive or Living Will: Discussed advance care planning with patient; information given to " patient to review.    HPI Medicare annual wellness visit.       4/4/2024   General Health   How would you rate your overall physical health? Good   Feel stress (tense, anxious, or unable to sleep) Not at all         4/4/2024   Nutrition   Diet: Regular (no restrictions)         4/4/2024   Exercise   Days per week of moderate/strenous exercise 1 day   Average minutes spent exercising at this level 10 min   (!) EXERCISE CONCERN      4/4/2024   Social Factors   Frequency of gathering with friends or relatives Once a week   Worry food won't last until get money to buy more No   Food not last or not have enough money for food? No   Do you have housing?  Yes   Are you worried about losing your housing? No   Lack of transportation? No   Unable to get utilities (heat,electricity)? No         4/10/2024   Fall Risk   Fallen 2 or more times in the past year? No   Trouble with walking or balance? No          4/4/2024   Activities of Daily Living- Home Safety   Needs help with the following daily activites None of the above   Safety concerns in the home None of the above         4/4/2024   Dental   Dentist two times every year? (!) NO         4/4/2024   Hearing Screening   Hearing concerns? (!) I NEED TO ASK PEOPLE TO SPEAK UP OR REPEAT THEMSELVES.         4/4/2024   Driving Risk Screening   Patient/family members have concerns about driving No         4/4/2024   General Alertness/Fatigue Screening   Have you been more tired than usual lately? No         4/4/2024   Urinary Incontinence Screening   Bothered by leaking urine in past 6 months No         4/4/2024   TB Screening   Were you born outside of the [place]? No         Today's PHQ-2 Score:       4/10/2024     9:03 AM   PHQ-2 ( 1999 [employer])   Q1: Little interest or pleasure in doing things 0   Q2: Feeling down, depressed or hopeless 0   PHQ-2 Score 0   Q1: Little interest or pleasure in doing things Not at all   Q2: Feeling down, depressed or hopeless Not at all   PHQ-2 Score 0            4/4/2024   Substance Use   Alcohol more than 3/day or more than 7/wk No   Do you have a current opioid prescription? No   How severe/bad is pain from 1 to 10? 0/10 (No Pain)   Do you use any other substances recreationally? No     Social History     Tobacco Use    Smoking status: Former     Passive exposure: Current    Smokeless tobacco: Never   Vaping Use    Vaping status: Never Used   Substance Use Topics    Alcohol use: Yes     Alcohol/week: 1.0 standard drink of alcohol     Types: 1 Glasses of wine per week     Comment: VERY LITTLE    Drug use: Never       ASCVD Risk   The ASCVD Risk score (Naseem PEREIRA, et al., 2019) failed to calculate for the following reasons:    The valid total cholesterol range is 130 to 320 mg/dL    Reviewed and updated as needed this visit by Provider   Tobacco  Allergies  Meds  Problems  Med Hx  Surg Hx  Fam Hx            Current providers sharing in care for this patient include:  Patient Care Team:  Priti Charles MD as PCP - General  Ana Dorsey MD as MD  Haritha Patel, PharmD as MTM [place] (Pharmacist)  Yamilet Hess PT as Physical Therapist (Physical Therapy)  Angela Garner NP as Nurse Practitioner  AndLeslie mario APRN CNP as Assigned PCP  Duong Pérez MD as MD (Hematology)    The following health maintenance items are reviewed in Epic and correct as of today:  Health Maintenance   Topic Date Due    RSV VACCINE (Pregnancy & 60+) (1 - 1-dose 60+ series) Never done    LUNG CANCER SCREENING  05/23/2012    INFLUENZA VACCINE (1) 09/01/2023    COVID-19 Vaccine (5 - 2023-24 season) 09/01/2023    ANNUAL REVIEW OF HM ORDERS  01/25/2024    EYE EXAM  02/16/2024    LIPID  03/14/2024    MICROALBUMIN  03/14/2024    A1C  07/10/2024    BMP  10/23/2024    MEDICARE ANNUAL WELLNESS VISIT  04/10/2025    DIABETIC FOOT EXAM  04/10/2025    FALL RISK ASSESSMENT  04/10/2025    COLORECTAL CANCER SCREENING  11/03/2025    ADVANCE CARE PLANNING  03/14/2028     "DTAP/TDAP/TD IMMUNIZATION (4 - Td or Tdap) 01/03/2033    HEPATITIS C SCREENING  Completed    PHQ-2 (once per calendar year)  Completed    Pneumococcal Vaccine: 65+ Years  Completed    ZOSTER IMMUNIZATION  Completed    AORTIC ANEURYSM SCREENING (SYSTEM ASSIGNED)  Completed    IPV IMMUNIZATION  Aged Out    HPV IMMUNIZATION  Aged Out    MENINGITIS IMMUNIZATION  Aged Out    RSV MONOCLONAL ANTIBODY  Aged Out            Objective    Exam  BP (!) 140/78   Pulse 96   Temp 98.5  F (36.9  C) (Oral)   Resp 16   Ht 1.721 m (5' 7.75\")   Wt 96.3 kg (212 lb 3.2 oz)   SpO2 97%   BMI 32.50 kg/m     Estimated body mass index is 32.5 kg/m  as calculated from the following:    Height as of this encounter: 1.721 m (5' 7.75\").    Weight as of this encounter: 96.3 kg (212 lb 3.2 oz).    Physical Exam  Constitutional:       Appearance: Normal appearance.   HENT:      Head: Normocephalic and atraumatic.   Cardiovascular:      Rate and Rhythm: Normal rate and regular rhythm.      Heart sounds: Normal heart sounds.   Pulmonary:      Effort: Pulmonary effort is normal.      Breath sounds: Normal breath sounds.   Abdominal:      General: Bowel sounds are normal.      Palpations: Abdomen is soft.   Musculoskeletal:         General: Normal range of motion.      Cervical back: Normal range of motion and neck supple.   Neurological:      General: No focal deficit present.      Mental Status: He is alert.       Diabetic foot exam: normal DP and PT pulses, no trophic changes or ulcerative lesions, and normal sensory exam         4/10/2024   Mini Cog   Clock Draw Score 2 Normal   3 Item Recall 1 object recalled   Mini Cog Total Score 3         Signed Electronically by: Priti Charles MD    "

## 2024-04-10 NOTE — ASSESSMENT & PLAN NOTE
Managed Dr. Lopez in remote past.   Follow up recommended as he stated in 2016 he was supposed to follow up in two years and now it is unclear if that ever happened.

## 2024-04-10 NOTE — ASSESSMENT & PLAN NOTE
Colon cancer - saw Dr. Pérez in past and he declined chemo or radiation and since then they have not followed up, offered referral back to oncology but they declined for now wanting look back at their notes before following up.

## 2024-04-10 NOTE — ASSESSMENT & PLAN NOTE
Remains on Flomax 0.4 mg daily.  Reports good control symptoms. Refilled.   In past Dr. Zuniga had mentioned consideration of TURP,  - He says he has appt with dr. Zuniga coming up

## 2024-04-28 DIAGNOSIS — E11.9 TYPE 2 DIABETES MELLITUS WITHOUT COMPLICATION, UNSPECIFIED WHETHER LONG TERM INSULIN USE (H): ICD-10-CM

## 2024-05-02 DIAGNOSIS — E11.9 TYPE 2 DIABETES MELLITUS WITHOUT COMPLICATION, WITHOUT LONG-TERM CURRENT USE OF INSULIN (H): ICD-10-CM

## 2024-05-03 RX ORDER — GLIPIZIDE 10 MG/1
TABLET, FILM COATED, EXTENDED RELEASE ORAL
Qty: 90 TABLET | Refills: 0 | Status: SHIPPED | OUTPATIENT
Start: 2024-05-03 | End: 2024-08-01

## 2024-05-08 NOTE — TELEPHONE ENCOUNTER
Patient scheduled.   Serenity Hernández, a  at 31w6d with an PRASHANT of 2024, by Patient Reported, was seen at [facility] for the following procedure(s): $Procedure Type: US - Transvaginal]                         Ultrasound Other  Fetal Presentation: Vertex  Cervical Length: 3.14  Funnel: No         Ultrasound Probe Disinfection    A transvaginal ultrasound was performed.   Prior to use, disinfection was performed with High Level Disinfection Process (Trophon)      Elly Guzman DO  24  3:55 PM

## 2024-05-09 ENCOUNTER — OFFICE VISIT (OUTPATIENT)
Dept: ENDOCRINOLOGY | Facility: CLINIC | Age: 74
End: 2024-05-09
Payer: COMMERCIAL

## 2024-05-09 VITALS
HEART RATE: 92 BPM | WEIGHT: 216.5 LBS | DIASTOLIC BLOOD PRESSURE: 90 MMHG | OXYGEN SATURATION: 97 % | BODY MASS INDEX: 33.16 KG/M2 | SYSTOLIC BLOOD PRESSURE: 157 MMHG

## 2024-05-09 DIAGNOSIS — E11.9 TYPE 2 DIABETES MELLITUS WITHOUT COMPLICATION, WITHOUT LONG-TERM CURRENT USE OF INSULIN (H): Primary | ICD-10-CM

## 2024-05-09 PROBLEM — C64.1 MALIGNANT NEOPLASM OF RIGHT KIDNEY, EXCEPT RENAL PELVIS (H): Status: ACTIVE | Noted: 2024-05-09

## 2024-05-09 PROCEDURE — 99213 OFFICE O/P EST LOW 20 MIN: CPT | Performed by: NURSE PRACTITIONER

## 2024-05-09 NOTE — LETTER
5/9/2024         RE: Regis Brooke  [address] [place] [address]        Dear Colleague,    Thank you for referring your patient, Regis Brooke, to the [facility] [place]. Please see a copy of my visit note below.    [facility]    Diabetes Note 5/9/2024    Regis Brooke, 1950, [id number]          Reason for visit      1. Type 2 diabetes mellitus without complication, without long-term current use of insulin (H)        HPI     Regis Brooke is a very pleasant 73 year old old male who presents for follow up.    Chet returns today in follow-up for DM 2. He continues to do a great job with his BG management, because he finally has all of the right tools.     He is taking Jardiance, 10 mg daily, Glipizide XL, 10 mg daily, and Metformin, 1000 mg BID. He is not regularly checking his BG and did not bring in a Glucometer today.     He has been working on BP management with his PCP. BP is elevated today.     Renal and Hepatic function are within normal range. He is on both a Statin and an ACE.     [place] this [place].      Blood glucose data:      Past Medical History     Patient Active Problem List   Diagnosis     Hypercholesterolemia     Hypertension     Calculus of kidney and ureter     Type 2 diabetes mellitus without complication (H)     History of primary malignant neoplasm of kidney     Vitamin D deficiency     Health care maintenance     Wears hearing aid - bilateral     Pain in both hands     Cognitive deficits     Microcytic anemia     Malignant neoplasm of ascending colon (H)     Colon cancer (H)     Diverticular disease of colon     Benign prostatic hyperplasia with lower urinary tract symptoms, symptom details unspecified     Class 1 obesity due to excess calories with serious comorbidity and body mass index (BMI) of 32.0 to 32.9 in adult     RLQ abdominal mass     Acute left-sided low back pain with left-sided sciatica      Neuropathic pain, leg, left     Encounter for Medicare annual wellness exam     Second hand smoke exposure     Neck pain     Malignant neoplasm of right kidney, except renal pelvis (H)        Family History       family history is not on file.    Social History      reports that he has quit smoking. He has been exposed to tobacco smoke. He has never used smokeless tobacco. He reports current alcohol use of about 1.0 standard drink of alcohol per week. He reports that he does not use drugs.      Review of Systems     Patient has no polyuria or polydipsia, no chest pain, dyspnea or TIA's, no numbness, tingling or pain in extremities  Remainder negative except as noted in HPI.    Vital Signs     BP (!) 157/90 (BP Location: Left arm, Patient Position: Sitting, Cuff Size: Adult Regular)   Pulse 92   Wt 98.2 kg (216 lb 8 oz)   SpO2 97%   BMI 33.16 kg/m    Wt Readings from Last 3 Encounters:   05/09/24 98.2 kg (216 lb 8 oz)   04/10/24 96.3 kg (212 lb 3.2 oz)   11/03/23 95.5 kg (210 lb 9.6 oz)       Physical Exam     Constitutional:  Well developed, Well nourished  HENT:  Normocephalic,   Neck: Thyroid normal, No lymph nodes, Supple  Eyes:  PERRL, Conjunctiva pink  Respiratory:  Normal breath sounds, No respiratory distress  Cardiovascular:  Normal heart rate, Normal rhythm, No murmurs  GI:  Bowel sounds normal, Soft, No tenderness  Musculoskeletal:  No gross deformity or lesions, normal dorsalis pedis pulses  Skin: No acanthosis nigricans, lipoatrophy or lipodystrophy  Neurologic:  Alert & oriented x 3, nonfocal  Psychiatric:  Affect, Mood, Insight appropriate        Assessment     1. Type 2 diabetes mellitus without complication, without long-term current use of insulin (H)        Plan     Chet's control is quite good, no changes warranted today. Follow-up with me in 6 months.           Angela Garner NP  [facility]  5/9/2024  7:38 AM      Lab Results     Microalbumin Urine mg/dL   Date Value Ref Range Status    02/09/2022 2.85 (H) 0.00 - 1.99 mg/dL Final       Cholesterol   Date Value Ref Range Status   04/10/2024 141 <200 mg/dL Final     Direct Measure HDL   Date Value Ref Range Status   04/10/2024 63 >=40 mg/dL Final     Triglycerides   Date Value Ref Range Status   04/10/2024 89 <150 mg/dL Final       [unfilled]      Current Medications     Outpatient Medications Prior to Visit   Medication Sig Dispense Refill     amLODIPine (NORVASC) 10 MG tablet Take 1 tablet (10 mg) by mouth daily 90 tablet 3     aspirin (ASA) 325 MG EC tablet Take 325 mg by mouth daily       atorvastatin (LIPITOR) 80 MG tablet Take 1 tablet (80 mg) by mouth daily 90 tablet 3     blood glucose (NO BRAND SPECIFIED) lancets standard Use to test blood sugar 3 times daily. 300 each 1     blood glucose (NO BRAND SPECIFIED) test strip Use to test blood sugar 3 times daily. 300 strip 1     blood glucose monitoring (NO BRAND SPECIFIED) meter device kit Use to test blood sugar 3 times daily. 1 kit 0     calcium carbonate (OS-EDER) 500 MG tablet Take 1 tablet by mouth daily       CRANBERRY EXTRACT (CRANBERRY ORAL) [CRANBERRY EXTRACT (CRANBERRY ORAL)] Take by mouth.       cyanocobalamin (VITAMIN B-12) 100 MCG tablet Take 100 mcg by mouth daily       glipiZIDE (GLUCOTROL XL) 10 MG 24 hr tablet TAKE 1 TABLET(10 MG) BY MOUTH DAILY 90 tablet 0     JARDIANCE 10 MG TABS tablet TAKE 1 TABLET(10 MG) BY MOUTH DAILY 90 tablet 1     lisinopril (ZESTRIL) 40 MG tablet Take 1 tablet (40 mg) by mouth daily 90 tablet 3     metFORMIN (GLUCOPHAGE) 500 MG tablet TAKE 2 TABLETS(1000 MG) BY MOUTH TWICE DAILY WITH MEALS 360 tablet 1     MULTIVITAMIN ORAL Take 1 tablet by mouth daily        tamsulosin (FLOMAX) 0.4 MG capsule Take 1 capsule (0.4 mg) by mouth daily 90 capsule 3     Vitamin D3 (CHOLECALCIFEROL) 125 MCG (5000 UT) tablet Take 125 mcg by mouth daily       No facility-administered medications prior to visit.           Again, thank you for allowing me to participate in the  care of your patient.        Sincerely,        Angela Garner NP

## 2024-05-09 NOTE — PROGRESS NOTES
[facility] ENDOCRINOLOGY    Diabetes Note 5/9/2024    Regis Brooke, 1950, [id number]          Reason for visit      1. Type 2 diabetes mellitus without complication, without long-term current use of insulin (H)        HPI     Regis Brooke is a very pleasant 73 year old old male who presents for follow up.    Chet returns today in follow-up for DM 2. He continues to do a great job with his BG management, because he finally has all of the right tools.     He is taking Jardiance, 10 mg daily, Glipizide XL, 10 mg daily, and Metformin, 1000 mg BID. He is not regularly checking his BG and did not bring in a Glucometer today.     He has been working on BP management with his PCP. BP is elevated today.     Renal and Hepatic function are within normal range. He is on both a Statin and an ACE.     He is headed to [facility] this weekend.      Blood glucose data:      Past Medical History     Patient Active Problem List   Diagnosis    Hypercholesterolemia    Hypertension    Calculus of kidney and ureter    Type 2 diabetes mellitus without complication (H)    History of primary malignant neoplasm of kidney    Vitamin D deficiency    Health care maintenance    Wears hearing aid - bilateral    Pain in both hands    Cognitive deficits    Microcytic anemia    Malignant neoplasm of ascending colon (H)    Colon cancer (H)    Diverticular disease of colon    Benign prostatic hyperplasia with lower urinary tract symptoms, symptom details unspecified    Class 1 obesity due to excess calories with serious comorbidity and body mass index (BMI) of 32.0 to 32.9 in adult    RLQ abdominal mass    Acute left-sided low back pain with left-sided sciatica    Neuropathic pain, leg, left    Encounter for Medicare annual wellness exam    Second hand smoke exposure    Neck pain    Malignant neoplasm of right kidney, except renal pelvis (H)        Family History       family history is not on file.    Social History      reports  that he has quit smoking. He has been exposed to tobacco smoke. He has never used smokeless tobacco. He reports current alcohol use of about 1.0 standard drink of alcohol per week. He reports that he does not use drugs.      Review of Systems     Patient has no polyuria or polydipsia, no chest pain, dyspnea or TIA's, no numbness, tingling or pain in extremities  Remainder negative except as noted in HPI.    Vital Signs     BP (!) 157/90 (BP Location: Left arm, Patient Position: Sitting, Cuff Size: Adult Regular)   Pulse 92   Wt 98.2 kg (216 lb 8 oz)   SpO2 97%   BMI 33.16 kg/m    Wt Readings from Last 3 Encounters:   05/09/24 98.2 kg (216 lb 8 oz)   04/10/24 96.3 kg (212 lb 3.2 oz)   11/03/23 95.5 kg (210 lb 9.6 oz)       Physical Exam     Constitutional:  Well developed, Well nourished  HENT:  Normocephalic,   Neck: Thyroid normal, No lymph nodes, Supple  Eyes:  PERRL, Conjunctiva pink  Respiratory:  Normal breath sounds, No respiratory distress  Cardiovascular:  Normal heart rate, Normal rhythm, No murmurs  GI:  Bowel sounds normal, Soft, No tenderness  Musculoskeletal:  No gross deformity or lesions, normal dorsalis pedis pulses  Skin: No acanthosis nigricans, lipoatrophy or lipodystrophy  Neurologic:  Alert & oriented x 3, nonfocal  Psychiatric:  Affect, Mood, Insight appropriate        Assessment     1. Type 2 diabetes mellitus without complication, without long-term current use of insulin (H)        Plan     Chet's control is quite good, no changes warranted today. Follow-up with me in 6 months.           Angela Garner NP  [facility]  5/9/2024  7:38 AM      Lab Results     Microalbumin Urine mg/dL   Date Value Ref Range Status   02/09/2022 2.85 (H) 0.00 - 1.99 mg/dL Final       Cholesterol   Date Value Ref Range Status   04/10/2024 141 <200 mg/dL Final     Direct Measure HDL   Date Value Ref Range Status   04/10/2024 63 >=40 mg/dL Final     Triglycerides   Date Value Ref Range Status   04/10/2024 89  <150 mg/dL Final       [unfilled]      Current Medications     Outpatient Medications Prior to Visit   Medication Sig Dispense Refill    amLODIPine (NORVASC) 10 MG tablet Take 1 tablet (10 mg) by mouth daily 90 tablet 3    aspirin (ASA) 325 MG EC tablet Take 325 mg by mouth daily      atorvastatin (LIPITOR) 80 MG tablet Take 1 tablet (80 mg) by mouth daily 90 tablet 3    blood glucose (NO BRAND SPECIFIED) lancets standard Use to test blood sugar 3 times daily. 300 each 1    blood glucose (NO BRAND SPECIFIED) test strip Use to test blood sugar 3 times daily. 300 strip 1    blood glucose monitoring (NO BRAND SPECIFIED) meter device kit Use to test blood sugar 3 times daily. 1 kit 0    calcium carbonate (OS-EDER) 500 MG tablet Take 1 tablet by mouth daily      CRANBERRY EXTRACT (CRANBERRY ORAL) [CRANBERRY EXTRACT (CRANBERRY ORAL)] Take by mouth.      cyanocobalamin (VITAMIN B-12) 100 MCG tablet Take 100 mcg by mouth daily      glipiZIDE (GLUCOTROL XL) 10 MG 24 hr tablet TAKE 1 TABLET(10 MG) BY MOUTH DAILY 90 tablet 0    JARDIANCE 10 MG TABS tablet TAKE 1 TABLET(10 MG) BY MOUTH DAILY 90 tablet 1    lisinopril (ZESTRIL) 40 MG tablet Take 1 tablet (40 mg) by mouth daily 90 tablet 3    metFORMIN (GLUCOPHAGE) 500 MG tablet TAKE 2 TABLETS(1000 MG) BY MOUTH TWICE DAILY WITH MEALS 360 tablet 1    MULTIVITAMIN ORAL Take 1 tablet by mouth daily       tamsulosin (FLOMAX) 0.4 MG capsule Take 1 capsule (0.4 mg) by mouth daily 90 capsule 3    Vitamin D3 (CHOLECALCIFEROL) 125 MCG (5000 UT) tablet Take 125 mcg by mouth daily       No facility-administered medications prior to visit.

## 2024-05-13 ENCOUNTER — TELEPHONE (OUTPATIENT)
Dept: FAMILY MEDICINE | Facility: CLINIC | Age: 74
End: 2024-05-13
Payer: COMMERCIAL

## 2024-05-13 NOTE — TELEPHONE ENCOUNTER
----- Message from Priti Charles MD sent at 5/10/2024  4:59 PM CDT -----  Please schedule chet for follow up endocrinology says his bp has been high. Need to eval/ treat  ----- Message -----  From: Angela Garner NP  Sent: 5/9/2024   7:39 AM CDT  To: Priti Charles MD    Good morning!    Chet told me that you have been watching his BP - it was up again this morning and I thought that you might like to know..... :)    Samreen

## 2024-05-15 NOTE — TELEPHONE ENCOUNTER
Left message to call back for: Patient  Information to relay to patient: See provider message below and help patient schedule. OK to use reserved slots.

## 2024-05-29 ENCOUNTER — OFFICE VISIT (OUTPATIENT)
Dept: FAMILY MEDICINE | Facility: CLINIC | Age: 74
End: 2024-05-29
Payer: COMMERCIAL

## 2024-05-29 VITALS
HEIGHT: 68 IN | SYSTOLIC BLOOD PRESSURE: 137 MMHG | BODY MASS INDEX: 32.84 KG/M2 | RESPIRATION RATE: 16 BRPM | DIASTOLIC BLOOD PRESSURE: 87 MMHG | WEIGHT: 216.7 LBS | OXYGEN SATURATION: 98 % | HEART RATE: 86 BPM | TEMPERATURE: 98.3 F

## 2024-05-29 DIAGNOSIS — E55.9 VITAMIN D DEFICIENCY: ICD-10-CM

## 2024-05-29 DIAGNOSIS — I10 ESSENTIAL HYPERTENSION: Primary | ICD-10-CM

## 2024-05-29 DIAGNOSIS — I10 ESSENTIAL HYPERTENSION: ICD-10-CM

## 2024-05-29 DIAGNOSIS — E66.811 CLASS 1 OBESITY DUE TO EXCESS CALORIES WITH SERIOUS COMORBIDITY AND BODY MASS INDEX (BMI) OF 33.0 TO 33.9 IN ADULT: ICD-10-CM

## 2024-05-29 DIAGNOSIS — E78.00 HYPERCHOLESTEROLEMIA: ICD-10-CM

## 2024-05-29 DIAGNOSIS — E11.9 TYPE 2 DIABETES MELLITUS WITHOUT COMPLICATION, WITHOUT LONG-TERM CURRENT USE OF INSULIN (H): ICD-10-CM

## 2024-05-29 DIAGNOSIS — E66.09 CLASS 1 OBESITY DUE TO EXCESS CALORIES WITH SERIOUS COMORBIDITY AND BODY MASS INDEX (BMI) OF 33.0 TO 33.9 IN ADULT: ICD-10-CM

## 2024-05-29 DIAGNOSIS — Z29.11 NEED FOR VACCINATION AGAINST RESPIRATORY SYNCYTIAL VIRUS: ICD-10-CM

## 2024-05-29 PROCEDURE — G2211 COMPLEX E/M VISIT ADD ON: HCPCS | Performed by: FAMILY MEDICINE

## 2024-05-29 PROCEDURE — 99214 OFFICE O/P EST MOD 30 MIN: CPT | Performed by: FAMILY MEDICINE

## 2024-05-29 RX ORDER — HYDROCHLOROTHIAZIDE 12.5 MG/1
12.5 TABLET ORAL DAILY
Qty: 30 TABLET | Refills: 1 | Status: SHIPPED | OUTPATIENT
Start: 2024-05-29 | End: 2024-07-22

## 2024-05-29 RX ORDER — RESPIRATORY SYNCYTIAL VIRUS VACCINE 120MCG/0.5
0.5 KIT INTRAMUSCULAR ONCE
Qty: 1 EACH | Refills: 0 | Status: CANCELLED | OUTPATIENT
Start: 2024-05-29 | End: 2024-05-29

## 2024-05-29 RX ORDER — HYDROCHLOROTHIAZIDE 12.5 MG/1
12.5 TABLET ORAL DAILY
Qty: 90 TABLET | OUTPATIENT
Start: 2024-05-29

## 2024-05-29 SDOH — HEALTH STABILITY: PHYSICAL HEALTH

## 2024-05-29 NOTE — ASSESSMENT & PLAN NOTE
Obesity, evidenced by bmi 33.19. we discussed his physical activity, he thinks he should walk more often. He and his wife plan to discuss how they will implement this.

## 2024-05-29 NOTE — ASSESSMENT & PLAN NOTE
Hypertension. Regis is currently taking norvasc 10 mg poq day and lisinopril 40 mg po q day. His bp is borderline today but all his home readings are elevated.  He would like improved control.  Recommend adding hydrochlorothiazide 12.5 mg p.o. daily and repeat blood pressure check and BMP in 1 month.  Follow-up ticket for nurse visit is placed.      In one month, If his blood pressure is still elevated over 130/80 consistently I would recommend increasing the hydrochlorothiazide to 25 mg p.o. daily. Bmp was ordered today and should be done in one month

## 2024-05-29 NOTE — ASSESSMENT & PLAN NOTE
Vitamin d was elevated 4/2024, repeat vitamin D order is placed and MyChart result note was sent to reduce his vitamin D intake.

## 2024-05-29 NOTE — PROGRESS NOTES
The longitudinal plan of care for the diagnosis(es)/condition(s) as documented were addressed during this visit. Due to the added complexity in care, I will continue to support Chet in the subsequent management and with ongoing continuity of care.  Assessment & Plan   Problem List Items Addressed This Visit          Digestive    Vitamin D deficiency     Vitamin d was elevated 4/2024, repeat vitamin D order is placed and MyChart result note was sent to reduce his vitamin D intake.         Relevant Orders    Vitamin D Deficiency    Class 1 obesity due to excess calories with serious comorbidity and body mass index (BMI) of 33.0 to 33.9 in adult     Obesity, evidenced by bmi 33.19. we discussed his physical activity, he thinks he should walk more often. He and his wife plan to discuss how they will implement this.             Endocrine    Hypercholesterolemia     Recent lipids look excellent, continue Lipitor 80 mg po q day         Type 2 diabetes mellitus without complication (H)     Diabetes managed by endocrinology Angela Garner NP               Circulatory    Hypertension - Primary     Hypertension. Regis is currently taking norvasc 10 mg poq day and lisinopril 40 mg po q day. His bp is borderline today but all his home readings are elevated.  He would like improved control.  Recommend adding hydrochlorothiazide 12.5 mg p.o. daily and repeat blood pressure check and BMP in 1 month.  Follow-up ticket for nurse visit is placed.      In one month, If his blood pressure is still elevated over 130/80 consistently I would recommend increasing the hydrochlorothiazide to 25 mg p.o. daily. Bmp was ordered today and should be done in one month         Relevant Medications    hydroCHLOROthiazide 12.5 MG tablet    Other Relevant Orders    Basic metabolic panel  (Ca, Cl, CO2, Creat, Gluc, K, Na, BUN)     Other Visit Diagnoses       Need for vaccination against respiratory syncytial virus                  Subjective   Chet is a  "73 year old, presenting for the following health issues:  Follow Up (BP recheck) and Imm/Inj (Please sign RSV vaccine so patient can get it at pharmacy. )      5/29/2024     7:26 AM   Additional Questions   Roomed by Tomás Oneal MA   Accompanied by Wife         5/29/2024     7:26 AM   Patient Reported Additional Medications   Patient reports taking the following new medications None     History of Present Illness       Hypertension: He presents for follow up of hypertension.  He does not check blood pressure  regularly outside of the clinic. Outside blood pressures have been over 140/90. He does not follow a low salt diet.     He eats 2-3 servings of fruits and vegetables daily.He consumes 2 sweetened beverage(s) daily.He exercises with enough effort to increase his heart rate 9 or less minutes per day.  He exercises with enough effort to increase his heart rate 3 or less days per week.   He is taking medications regularly.           Objective    /87 (BP Location: Left arm, Patient Position: Sitting, Cuff Size: Adult Large)   Pulse 86   Temp 98.3  F (36.8  C) (Oral)   Resp 16   Ht 1.721 m (5' 7.75\")   Wt 98.3 kg (216 lb 11.2 oz)   SpO2 98%   BMI 33.19 kg/m    Body mass index is 33.19 kg/m .  Physical Exam  Constitutional:       Appearance: Normal appearance.   HENT:      Head: Normocephalic and atraumatic.   Cardiovascular:      Rate and Rhythm: Normal rate and regular rhythm.   Pulmonary:      Effort: Pulmonary effort is normal.   Musculoskeletal:         General: Normal range of motion.      Cervical back: Normal range of motion and neck supple.   Neurological:      General: No focal deficit present.      Mental Status: He is alert.                Signed Electronically by: Priti Charles MD    "

## 2024-06-13 DIAGNOSIS — E11.9 TYPE 2 DIABETES MELLITUS WITHOUT COMPLICATION, WITHOUT LONG-TERM CURRENT USE OF INSULIN (H): ICD-10-CM

## 2024-06-13 NOTE — TELEPHONE ENCOUNTER
Requested Prescriptions   Pending Prescriptions Disp Refills    CONTOUR NEXT TEST test strip [Pharmacy Med Name: CONTOUR NEXT TEST STRIPS 100S] 300 strip 1     Sig: USE TO TEST BLOOD SUGAR THREE TIMES DAILY       Diabetic Supplies Protocol Passed - 6/13/2024  3:16 AM        Passed - Medication is active on med list        Passed - Recent (12 month) or future (90 days) visit with authorizing provider s specialty     The patient must have completed an in-person or virtual visit within the past 12 months or has a future visit scheduled within the next 90 days with the authorizing provider s specialty.  Urgent care and e-visits do not quality as an office visit for this protocol.          Passed - Medication indicated for associated diagnosis        Passed - Patient is 18 years of age or older

## 2024-07-14 DIAGNOSIS — E11.9 TYPE 2 DIABETES MELLITUS WITHOUT COMPLICATION, UNSPECIFIED WHETHER LONG TERM INSULIN USE (H): ICD-10-CM

## 2024-07-15 RX ORDER — EMPAGLIFLOZIN 10 MG/1
10 TABLET, FILM COATED ORAL DAILY
Qty: 90 TABLET | Refills: 0 | Status: SHIPPED | OUTPATIENT
Start: 2024-07-15

## 2024-07-15 NOTE — TELEPHONE ENCOUNTER
Requested Prescriptions   Pending Prescriptions Disp Refills    JARDIANCE 10 MG TABS tablet [Pharmacy Med Name: JARDIANCE 10MG TABLETS] 90 tablet 1     Sig: TAKE 1 TABLET(10 MG) BY MOUTH DAILY       Sodium Glucose Co-Transport Inhibitor Agents Passed - 7/14/2024  3:16 AM        Passed - Patient has documented A1c within the specified period of time.     If HgbA1C is 8 or greater, it needs to be on file within the past 3 months.  If less than 8, must be on file within the past 6 months.     Recent Labs   Lab Test 04/10/24  0921   A1C 6.7*             Passed - Medication is active on med list        Passed - Has GFR on file in past 12 months and most recent value is normal        Passed - Recent (6 mo) or future (90 days) visit within the authorizing provider's specialty     The patient must have completed an in-person or virtual visit within the past 6 months or has a future visit scheduled within the next 90 days with the authorizing provider s specialty.  Urgent care and e-visits do not quality as an office visit for this protocol.          Passed - Medication indicated for associated diagnosis     Medication is associated with one or more of the following diagnoses:     Diabetic nephropathy, With Albuminuria - Type 2 diabetes mellitus     Disorder of cardiovascular system; Prophylaxis - Type 2 diabetes mellitus     Type 2 diabetes mellitus    Disorder of cardiovascular system; Prophylaxis - Heart failure   Chronic kidney disease, (At risk of progression) to reduce the risk of sustained   estimated GFR decline, end-stage kidney disease, cardiovascular death,   and hospitalization for heart failure     Heart failure, (NYHA class II to IV, reduced ejection fraction) to reduce risk of  cardiovascular death and hospitalization           Passed - Patient is age 18 or older        Passed - Patient has documented normal Potassium within the last 12 mos.     Recent Labs   Lab Test 04/10/24  0921   POTASSIUM 4.1

## 2024-07-20 ENCOUNTER — HEALTH MAINTENANCE LETTER (OUTPATIENT)
Age: 74
End: 2024-07-20

## 2024-07-22 DIAGNOSIS — I10 ESSENTIAL HYPERTENSION: ICD-10-CM

## 2024-07-22 RX ORDER — HYDROCHLOROTHIAZIDE 12.5 MG/1
12.5 TABLET ORAL DAILY
Qty: 90 TABLET | Refills: 1 | Status: SHIPPED | OUTPATIENT
Start: 2024-07-22

## 2024-07-23 ENCOUNTER — ALLIED HEALTH/NURSE VISIT (OUTPATIENT)
Dept: FAMILY MEDICINE | Facility: CLINIC | Age: 74
End: 2024-07-23
Payer: COMMERCIAL

## 2024-07-23 VITALS
OXYGEN SATURATION: 97 % | DIASTOLIC BLOOD PRESSURE: 68 MMHG | HEART RATE: 61 BPM | RESPIRATION RATE: 16 BRPM | SYSTOLIC BLOOD PRESSURE: 114 MMHG

## 2024-07-23 DIAGNOSIS — I10 ESSENTIAL HYPERTENSION: Primary | ICD-10-CM

## 2024-07-23 PROCEDURE — 99207 PR NO CHARGE NURSE ONLY: CPT

## 2024-07-23 NOTE — PROGRESS NOTES
I met with Regis Brooke at the request of Dr. Charles to recheck his blood pressure.  Blood pressure medications on the med list were reviewed with patient.    Patient has taken all medications as per usual regimen: Yes  Patient reports tolerating them without any issues or concerns: Yes    Vitals:    07/23/24 1110   BP: 114/68   BP Location: Left arm   Patient Position: Sitting   Cuff Size: Adult Large   Pulse: 61   Resp: 16   SpO2: 97%       Blood pressure was taken, previous encounter was reviewed, recorded blood pressure below 140/90.  Patient was discharged and the note will be sent to the provider for final review.

## 2024-07-31 DIAGNOSIS — E11.9 TYPE 2 DIABETES MELLITUS WITHOUT COMPLICATION, WITHOUT LONG-TERM CURRENT USE OF INSULIN (H): ICD-10-CM

## 2024-08-01 RX ORDER — GLIPIZIDE 10 MG/1
TABLET, FILM COATED, EXTENDED RELEASE ORAL
Qty: 90 TABLET | Refills: 0 | Status: SHIPPED | OUTPATIENT
Start: 2024-08-01

## 2024-10-11 DIAGNOSIS — E11.9 TYPE 2 DIABETES MELLITUS WITHOUT COMPLICATION, UNSPECIFIED WHETHER LONG TERM INSULIN USE (H): ICD-10-CM

## 2024-10-11 RX ORDER — EMPAGLIFLOZIN 10 MG/1
10 TABLET, FILM COATED ORAL DAILY
Qty: 30 TABLET | Refills: 0 | Status: SHIPPED | OUTPATIENT
Start: 2024-10-11 | End: 2024-10-14

## 2024-10-11 NOTE — TELEPHONE ENCOUNTER
Requested Prescriptions   Pending Prescriptions Disp Refills    JARDIANCE 10 MG TABS tablet [Pharmacy Med Name: JARDIANCE 10MG TABLETS] 90 tablet 0     Sig: TAKE 1 TABLET(10 MG) BY MOUTH DAILY       Sodium Glucose Co-Transport Inhibitor Agents Failed - 10/11/2024  3:17 AM        Failed - Patient has documented A1c within the specified period of time.     If HgbA1C is 8 or greater, it needs to be on file within the past 3 months.  If less than 8, must be on file within the past 6 months.     Recent Labs   Lab Test 04/10/24  0921   A1C 6.7*             Passed - Medication is active on med list        Passed - Recent (6 mo) or future (90 days) visit within the authorizing provider's specialty     The patient must have completed an in-person or virtual visit within the past 6 months or has a future visit scheduled within the next 90 days with the authorizing provider s specialty.  Urgent care and e-visits do not quality as an office visit for this protocol.          Passed - Medication indicated for associated diagnosis     Medication is associated with one or more of the following diagnoses:     Diabetic nephropathy, With Albuminuria - Type 2 diabetes mellitus     Disorder of cardiovascular system; Prophylaxis - Type 2 diabetes mellitus     Type 2 diabetes mellitus    Disorder of cardiovascular system; Prophylaxis - Heart failure   Chronic kidney disease, (At risk of progression) to reduce the risk of sustained   estimated GFR decline, end-stage kidney disease, cardiovascular death,   and hospitalization for heart failure     Heart failure, (NYHA class II to IV, reduced ejection fraction) to reduce risk of  cardiovascular death and hospitalization           Passed - Has GFR on file in past 12 months and most recent value is >30        Passed - Patient is age 18 or older        Passed - Patient has documented normal Potassium within the last 12 mos.     Recent Labs   Lab Test 04/10/24  0921   POTASSIUM 4.1

## 2024-10-14 DIAGNOSIS — E11.9 TYPE 2 DIABETES MELLITUS WITHOUT COMPLICATION, UNSPECIFIED WHETHER LONG TERM INSULIN USE (H): ICD-10-CM

## 2024-10-25 DIAGNOSIS — E11.9 TYPE 2 DIABETES MELLITUS WITHOUT COMPLICATION, WITHOUT LONG-TERM CURRENT USE OF INSULIN (H): ICD-10-CM

## 2024-10-25 RX ORDER — GLIPIZIDE 10 MG/1
TABLET, FILM COATED, EXTENDED RELEASE ORAL
Qty: 90 TABLET | Refills: 0 | Status: SHIPPED | OUTPATIENT
Start: 2024-10-25

## 2024-10-28 ENCOUNTER — LAB (OUTPATIENT)
Dept: LAB | Facility: CLINIC | Age: 74
End: 2024-10-28
Payer: COMMERCIAL

## 2024-10-28 DIAGNOSIS — I10 ESSENTIAL HYPERTENSION: ICD-10-CM

## 2024-10-28 DIAGNOSIS — E11.9 TYPE 2 DIABETES MELLITUS WITHOUT COMPLICATION, UNSPECIFIED WHETHER LONG TERM INSULIN USE (H): ICD-10-CM

## 2024-10-28 DIAGNOSIS — E11.9 TYPE 2 DIABETES MELLITUS WITHOUT COMPLICATION, WITHOUT LONG-TERM CURRENT USE OF INSULIN (H): ICD-10-CM

## 2024-10-28 DIAGNOSIS — E55.9 VITAMIN D DEFICIENCY: ICD-10-CM

## 2024-10-28 LAB
ANION GAP SERPL CALCULATED.3IONS-SCNC: 11 MMOL/L (ref 7–15)
BUN SERPL-MCNC: 15.5 MG/DL (ref 8–23)
CALCIUM SERPL-MCNC: 9.1 MG/DL (ref 8.8–10.4)
CHLORIDE SERPL-SCNC: 104 MMOL/L (ref 98–107)
CREAT SERPL-MCNC: 0.73 MG/DL (ref 0.67–1.17)
CREAT UR-MCNC: 63.1 MG/DL
EGFRCR SERPLBLD CKD-EPI 2021: >90 ML/MIN/1.73M2
EST. AVERAGE GLUCOSE BLD GHB EST-MCNC: 177 MG/DL
GLUCOSE SERPL-MCNC: 146 MG/DL (ref 70–99)
HBA1C MFR BLD: 7.8 % (ref 0–5.6)
HCO3 SERPL-SCNC: 25 MMOL/L (ref 22–29)
LDLC SERPL DIRECT ASSAY-MCNC: 50 MG/DL
MICROALBUMIN UR-MCNC: 39.1 MG/L
MICROALBUMIN/CREAT UR: 61.97 MG/G CR (ref 0–17)
POTASSIUM SERPL-SCNC: 4.2 MMOL/L (ref 3.4–5.3)
SODIUM SERPL-SCNC: 140 MMOL/L (ref 135–145)
VIT D+METAB SERPL-MCNC: 51 NG/ML (ref 20–50)

## 2024-10-28 PROCEDURE — 82043 UR ALBUMIN QUANTITATIVE: CPT

## 2024-10-28 PROCEDURE — 83036 HEMOGLOBIN GLYCOSYLATED A1C: CPT

## 2024-10-28 PROCEDURE — 82570 ASSAY OF URINE CREATININE: CPT

## 2024-10-28 PROCEDURE — 36415 COLL VENOUS BLD VENIPUNCTURE: CPT

## 2024-10-28 PROCEDURE — 83721 ASSAY OF BLOOD LIPOPROTEIN: CPT

## 2024-10-28 PROCEDURE — 82306 VITAMIN D 25 HYDROXY: CPT

## 2024-10-28 PROCEDURE — 80048 BASIC METABOLIC PNL TOTAL CA: CPT

## 2024-11-13 ENCOUNTER — OFFICE VISIT (OUTPATIENT)
Dept: ENDOCRINOLOGY | Facility: CLINIC | Age: 74
End: 2024-11-13
Payer: COMMERCIAL

## 2024-11-13 VITALS
WEIGHT: 211.9 LBS | SYSTOLIC BLOOD PRESSURE: 132 MMHG | BODY MASS INDEX: 32.46 KG/M2 | OXYGEN SATURATION: 97 % | HEART RATE: 71 BPM | DIASTOLIC BLOOD PRESSURE: 74 MMHG

## 2024-11-13 DIAGNOSIS — E11.9 TYPE 2 DIABETES MELLITUS WITHOUT COMPLICATION, WITHOUT LONG-TERM CURRENT USE OF INSULIN (H): Primary | ICD-10-CM

## 2024-11-13 PROCEDURE — 99213 OFFICE O/P EST LOW 20 MIN: CPT | Performed by: NURSE PRACTITIONER

## 2024-11-13 PROCEDURE — G2211 COMPLEX E/M VISIT ADD ON: HCPCS | Performed by: NURSE PRACTITIONER

## 2024-11-13 NOTE — LETTER
"11/13/2024      Regis Brooke  [address] [place] [address]      Dear Colleague,    Thank you for referring your patient, Regis Brooke, to the [facility]. Please see a copy of my visit note below.    [facility] ENDOCRINOLOGY    Diabetes Note 11/13/2024    Regis Brooke, 1950, [id number]          Reason for visit      1. Type 2 diabetes mellitus without complication, without long-term current use of insulin (H)        HPI     Regis Brooke is a very pleasant 74 year old old male who presents for follow up.  SUMMARY:    Chet is seen in follow-up for DM 2. His current A1c is 7.8 and up considerably from his previous of 6.7.     It seems that the increase in his BG has been food driven. He has an affinity for bread and has been indulging. He is not terribly diligent in his BG testing, and often \"out of sight, out of mind\", so he is unaware of how the bread is impacting his BG. He has only tested once in the last week. The prior week he tested two days of 7.     He is taking Jardiance, 10 mg daily, Glipizide XL 10 mg daily and Metformin, 1000 mg BID.     Renal function remains within normal limits. LDL is good at 50, and he is on a Statin. He does have Microalbuminuria, however the protein burden has lessened with the lowering of his BG.     Blood glucose data:      Past Medical History     Patient Active Problem List   Diagnosis     Hypercholesterolemia     Hypertension     Calculus of kidney and ureter     Type 2 diabetes mellitus without complication (H)     History of primary malignant neoplasm of kidney     Vitamin D deficiency     Health care maintenance     Wears hearing aid - bilateral     Pain in both hands     Cognitive deficits     Microcytic anemia     Malignant neoplasm of ascending colon (H)     Colon cancer (H)     Diverticular disease of colon     Benign prostatic hyperplasia with lower urinary tract symptoms, symptom details unspecified     Class 1 obesity " due to excess calories with serious comorbidity and body mass index (BMI) of 33.0 to 33.9 in adult     RLQ abdominal mass     Acute left-sided low back pain with left-sided sciatica     Neuropathic pain, leg, left     Encounter for Medicare annual wellness exam     Second hand smoke exposure     Neck pain     Malignant neoplasm of right kidney, except renal pelvis (H)        Family History       family history is not on file.    Social History      reports that he has quit smoking. He has been exposed to tobacco smoke. He has never used smokeless tobacco. He reports current alcohol use of about 1.0 standard drink of alcohol per week. He reports that he does not use drugs.      Review of Systems     Patient has no polyuria or polydipsia, no chest pain, dyspnea or TIA's, no numbness, tingling or pain in extremities  Remainder negative except as noted in HPI.    Vital Signs     /74 (BP Location: Right arm, Patient Position: Sitting, Cuff Size: Adult Regular)   Pulse 71   Wt 96.1 kg (211 lb 14.4 oz)   SpO2 97%   BMI 32.46 kg/m    Wt Readings from Last 3 Encounters:   11/13/24 96.1 kg (211 lb 14.4 oz)   05/29/24 98.3 kg (216 lb 11.2 oz)   05/09/24 98.2 kg (216 lb 8 oz)       Physical Exam     Constitutional:  Well developed, Well nourished  HENT:  Normocephalic,   Neck: Thyroid normal, No lymph nodes, Supple  Eyes:  PERRL, Conjunctiva pink  Respiratory:  Normal breath sounds, No respiratory distress  Cardiovascular:  Normal heart rate, Normal rhythm, No murmurs  GI:  Bowel sounds normal, Soft, No tenderness  Musculoskeletal:  No gross deformity or lesions  Skin: No acanthosis nigricans,   Neurologic:  Alert & oriented x 3, nonfocal  Psychiatric:  Affect, Mood, Insight appropriate        Assessment     1. Type 2 diabetes mellitus without complication, without long-term current use of insulin (H)        Plan     Chet will continue on his current medication regimen. Encouraged to rein in his bread consumption as  best he can. Follow-up with me in 6 months, should he so choose.         Angela Garner NP  [facility]  11/13/2024  11:46 AM      Lab Results     Microalbumin Urine mg/dL   Date Value Ref Range Status   02/09/2022 2.85 (H) 0.00 - 1.99 mg/dL Final       Cholesterol   Date Value Ref Range Status   04/10/2024 141 <200 mg/dL Final     Direct Measure HDL   Date Value Ref Range Status   04/10/2024 63 >=40 mg/dL Final     Triglycerides   Date Value Ref Range Status   04/10/2024 89 <150 mg/dL Final       [unfilled]      Current Medications     Outpatient Medications Prior to Visit   Medication Sig Dispense Refill     amLODIPine (NORVASC) 10 MG tablet Take 1 tablet (10 mg) by mouth daily 90 tablet 3     aspirin (ASA) 325 MG EC tablet Take 325 mg by mouth daily       atorvastatin (LIPITOR) 80 MG tablet Take 1 tablet (80 mg) by mouth daily 90 tablet 3     blood glucose (NO BRAND SPECIFIED) lancets standard Use to test blood sugar 3 times daily. 300 each 1     blood glucose monitoring (NO BRAND SPECIFIED) meter device kit Use to test blood sugar 3 times daily. 1 kit 0     calcium carbonate (OS-EDER) 500 MG tablet Take 1 tablet by mouth daily       CONTOUR NEXT TEST test strip USE TO TEST BLOOD SUGAR THREE TIMES DAILY 300 strip 1     CRANBERRY EXTRACT (CRANBERRY ORAL) [CRANBERRY EXTRACT (CRANBERRY ORAL)] Take by mouth.       cyanocobalamin (VITAMIN B-12) 100 MCG tablet Take 100 mcg by mouth daily       empagliflozin (JARDIANCE) 10 MG TABS tablet Take 1 tablet (10 mg) by mouth daily. 90 tablet 3     glipiZIDE (GLUCOTROL XL) 10 MG 24 hr tablet TAKE 1 TABLET(10 MG) BY MOUTH DAILY 90 tablet 0     hydroCHLOROthiazide 12.5 MG tablet TAKE 1 TABLET(12.5 MG) BY MOUTH DAILY 90 tablet 1     lisinopril (ZESTRIL) 40 MG tablet Take 1 tablet (40 mg) by mouth daily 90 tablet 3     metFORMIN (GLUCOPHAGE) 500 MG tablet TAKE 2 TABLETS(1000 MG) BY MOUTH TWICE DAILY WITH MEALS 360 tablet 0     MULTIVITAMIN ORAL Take 1 tablet by mouth daily         tamsulosin (FLOMAX) 0.4 MG capsule Take 1 capsule (0.4 mg) by mouth daily 90 capsule 3     Vitamin D3 (CHOLECALCIFEROL) 125 MCG (5000 UT) tablet Take 125 mcg by mouth daily       No facility-administered medications prior to visit.                       Again, thank you for allowing me to participate in the care of your patient.        Sincerely,        Angela Garner NP

## 2024-11-13 NOTE — PROGRESS NOTES
"[facility] ENDOCRINOLOGY    Diabetes Note 11/13/2024    Regis Brooke, 1950, [id number]          Reason for visit      1. Type 2 diabetes mellitus without complication, without long-term current use of insulin (H)        HPI     Regis Brooke is a very pleasant 74 year old old male who presents for follow up.  SUMMARY:    Chet is seen in follow-up for DM 2. His current A1c is 7.8 and up considerably from his previous of 6.7.     It seems that the increase in his BG has been food driven. He has an affinity for bread and has been indulging. He is not terribly diligent in his BG testing, and often \"out of sight, out of mind\", so he is unaware of how the bread is impacting his BG. He has only tested once in the last week. The prior week he tested two days of 7.     He is taking Jardiance, 10 mg daily, Glipizide XL 10 mg daily and Metformin, 1000 mg BID.     Renal function remains within normal limits. LDL is good at 50, and he is on a Statin. He does have Microalbuminuria, however the protein burden has lessened with the lowering of his BG.     Blood glucose data:      Past Medical History     Patient Active Problem List   Diagnosis    Hypercholesterolemia    Hypertension    Calculus of kidney and ureter    Type 2 diabetes mellitus without complication (H)    History of primary malignant neoplasm of kidney    Vitamin D deficiency    Health care maintenance    Wears hearing aid - bilateral    Pain in both hands    Cognitive deficits    Microcytic anemia    Malignant neoplasm of ascending colon (H)    Colon cancer (H)    Diverticular disease of colon    Benign prostatic hyperplasia with lower urinary tract symptoms, symptom details unspecified    Class 1 obesity due to excess calories with serious comorbidity and body mass index (BMI) of 33.0 to 33.9 in adult    RLQ abdominal mass    Acute left-sided low back pain with left-sided sciatica    Neuropathic pain, leg, left    Encounter for Medicare annual wellness " exam    Second hand smoke exposure    Neck pain    Malignant neoplasm of right kidney, except renal pelvis (H)        Family History       family history is not on file.    Social History      reports that he has quit smoking. He has been exposed to tobacco smoke. He has never used smokeless tobacco. He reports current alcohol use of about 1.0 standard drink of alcohol per week. He reports that he does not use drugs.      Review of Systems     Patient has no polyuria or polydipsia, no chest pain, dyspnea or TIA's, no numbness, tingling or pain in extremities  Remainder negative except as noted in HPI.    Vital Signs     /74 (BP Location: Right arm, Patient Position: Sitting, Cuff Size: Adult Regular)   Pulse 71   Wt 96.1 kg (211 lb 14.4 oz)   SpO2 97%   BMI 32.46 kg/m    Wt Readings from Last 3 Encounters:   11/13/24 96.1 kg (211 lb 14.4 oz)   05/29/24 98.3 kg (216 lb 11.2 oz)   05/09/24 98.2 kg (216 lb 8 oz)       Physical Exam     Constitutional:  Well developed, Well nourished  HENT:  Normocephalic,   Neck: Thyroid normal, No lymph nodes, Supple  Eyes:  PERRL, Conjunctiva pink  Respiratory:  Normal breath sounds, No respiratory distress  Cardiovascular:  Normal heart rate, Normal rhythm, No murmurs  GI:  Bowel sounds normal, Soft, No tenderness  Musculoskeletal:  No gross deformity or lesions  Skin: No acanthosis nigricans,   Neurologic:  Alert & oriented x 3, nonfocal  Psychiatric:  Affect, Mood, Insight appropriate        Assessment     1. Type 2 diabetes mellitus without complication, without long-term current use of insulin (H)        Plan     Chet will continue on his current medication regimen. Encouraged to rein in his bread consumption as best he can. Follow-up with me in 6 months, should he so choose.         Angela Garner NP  [facility]  11/13/2024  11:46 AM      Lab Results     Microalbumin Urine mg/dL   Date Value Ref Range Status   02/09/2022 2.85 (H) 0.00 - 1.99 mg/dL Final        Cholesterol   Date Value Ref Range Status   04/10/2024 141 <200 mg/dL Final     Direct Measure HDL   Date Value Ref Range Status   04/10/2024 63 >=40 mg/dL Final     Triglycerides   Date Value Ref Range Status   04/10/2024 89 <150 mg/dL Final       [unfilled]      Current Medications     Outpatient Medications Prior to Visit   Medication Sig Dispense Refill    amLODIPine (NORVASC) 10 MG tablet Take 1 tablet (10 mg) by mouth daily 90 tablet 3    aspirin (ASA) 325 MG EC tablet Take 325 mg by mouth daily      atorvastatin (LIPITOR) 80 MG tablet Take 1 tablet (80 mg) by mouth daily 90 tablet 3    blood glucose (NO BRAND SPECIFIED) lancets standard Use to test blood sugar 3 times daily. 300 each 1    blood glucose monitoring (NO BRAND SPECIFIED) meter device kit Use to test blood sugar 3 times daily. 1 kit 0    calcium carbonate (OS-EDER) 500 MG tablet Take 1 tablet by mouth daily      CONTOUR NEXT TEST test strip USE TO TEST BLOOD SUGAR THREE TIMES DAILY 300 strip 1    CRANBERRY EXTRACT (CRANBERRY ORAL) [CRANBERRY EXTRACT (CRANBERRY ORAL)] Take by mouth.      cyanocobalamin (VITAMIN B-12) 100 MCG tablet Take 100 mcg by mouth daily      empagliflozin (JARDIANCE) 10 MG TABS tablet Take 1 tablet (10 mg) by mouth daily. 90 tablet 3    glipiZIDE (GLUCOTROL XL) 10 MG 24 hr tablet TAKE 1 TABLET(10 MG) BY MOUTH DAILY 90 tablet 0    hydroCHLOROthiazide 12.5 MG tablet TAKE 1 TABLET(12.5 MG) BY MOUTH DAILY 90 tablet 1    lisinopril (ZESTRIL) 40 MG tablet Take 1 tablet (40 mg) by mouth daily 90 tablet 3    metFORMIN (GLUCOPHAGE) 500 MG tablet TAKE 2 TABLETS(1000 MG) BY MOUTH TWICE DAILY WITH MEALS 360 tablet 0    MULTIVITAMIN ORAL Take 1 tablet by mouth daily       tamsulosin (FLOMAX) 0.4 MG capsule Take 1 capsule (0.4 mg) by mouth daily 90 capsule 3    Vitamin D3 (CHOLECALCIFEROL) 125 MCG (5000 UT) tablet Take 125 mcg by mouth daily       No facility-administered medications prior to visit.

## 2025-01-23 DIAGNOSIS — E11.9 TYPE 2 DIABETES MELLITUS WITHOUT COMPLICATION, WITHOUT LONG-TERM CURRENT USE OF INSULIN (H): ICD-10-CM

## 2025-01-23 DIAGNOSIS — I10 ESSENTIAL HYPERTENSION: ICD-10-CM

## 2025-01-23 RX ORDER — GLIPIZIDE 10 MG/1
TABLET, FILM COATED, EXTENDED RELEASE ORAL
Qty: 90 TABLET | Refills: 0 | Status: SHIPPED | OUTPATIENT
Start: 2025-01-23

## 2025-01-23 RX ORDER — HYDROCHLOROTHIAZIDE 12.5 MG/1
12.5 TABLET ORAL DAILY
Qty: 90 TABLET | Refills: 0 | Status: SHIPPED | OUTPATIENT
Start: 2025-01-23

## 2025-01-31 DIAGNOSIS — E11.9 TYPE 2 DIABETES MELLITUS WITHOUT COMPLICATION, UNSPECIFIED WHETHER LONG TERM INSULIN USE (H): ICD-10-CM

## 2025-02-15 ENCOUNTER — HEALTH MAINTENANCE LETTER (OUTPATIENT)
Age: 75
End: 2025-02-15

## 2025-03-05 DIAGNOSIS — E11.9 TYPE 2 DIABETES MELLITUS WITHOUT COMPLICATION, UNSPECIFIED WHETHER LONG TERM INSULIN USE (H): ICD-10-CM

## 2025-03-11 ENCOUNTER — PATIENT OUTREACH (OUTPATIENT)
Dept: CARE COORDINATION | Facility: CLINIC | Age: 75
End: 2025-03-11
Payer: COMMERCIAL

## 2025-03-25 ENCOUNTER — PATIENT OUTREACH (OUTPATIENT)
Dept: CARE COORDINATION | Facility: CLINIC | Age: 75
End: 2025-03-25
Payer: COMMERCIAL

## 2025-03-25 DIAGNOSIS — N40.1 BENIGN PROSTATIC HYPERPLASIA WITH LOWER URINARY TRACT SYMPTOMS, SYMPTOM DETAILS UNSPECIFIED: ICD-10-CM

## 2025-03-25 RX ORDER — TAMSULOSIN HYDROCHLORIDE 0.4 MG/1
0.4 CAPSULE ORAL DAILY
Qty: 90 CAPSULE | Refills: 0 | Status: SHIPPED | OUTPATIENT
Start: 2025-03-25

## 2025-04-03 DIAGNOSIS — E11.9 TYPE 2 DIABETES MELLITUS WITHOUT COMPLICATION, WITHOUT LONG-TERM CURRENT USE OF INSULIN (H): ICD-10-CM

## 2025-04-12 DIAGNOSIS — I10 ESSENTIAL HYPERTENSION: ICD-10-CM

## 2025-04-14 RX ORDER — LISINOPRIL 40 MG/1
40 TABLET ORAL DAILY
Qty: 90 TABLET | Refills: 0 | Status: SHIPPED | OUTPATIENT
Start: 2025-04-14

## 2025-04-21 DIAGNOSIS — E11.9 TYPE 2 DIABETES MELLITUS WITHOUT COMPLICATION, WITHOUT LONG-TERM CURRENT USE OF INSULIN (H): ICD-10-CM

## 2025-04-21 RX ORDER — GLIPIZIDE 10 MG/1
10 TABLET, FILM COATED, EXTENDED RELEASE ORAL DAILY
Qty: 90 TABLET | Refills: 3 | Status: SHIPPED | OUTPATIENT
Start: 2025-04-21

## 2025-05-17 ENCOUNTER — HEALTH MAINTENANCE LETTER (OUTPATIENT)
Age: 75
End: 2025-05-17

## 2025-05-21 ENCOUNTER — RESULTS FOLLOW-UP (OUTPATIENT)
Dept: FAMILY MEDICINE | Facility: CLINIC | Age: 75
End: 2025-05-21

## 2025-05-21 ENCOUNTER — OFFICE VISIT (OUTPATIENT)
Dept: FAMILY MEDICINE | Facility: CLINIC | Age: 75
End: 2025-05-21
Payer: COMMERCIAL

## 2025-05-21 VITALS
WEIGHT: 215.4 LBS | DIASTOLIC BLOOD PRESSURE: 79 MMHG | RESPIRATION RATE: 14 BRPM | HEART RATE: 89 BPM | SYSTOLIC BLOOD PRESSURE: 154 MMHG | HEIGHT: 68 IN | TEMPERATURE: 98.7 F | BODY MASS INDEX: 32.64 KG/M2 | OXYGEN SATURATION: 97 %

## 2025-05-21 DIAGNOSIS — N40.1 BENIGN PROSTATIC HYPERPLASIA WITH LOWER URINARY TRACT SYMPTOMS, SYMPTOM DETAILS UNSPECIFIED: ICD-10-CM

## 2025-05-21 DIAGNOSIS — Z00.00 HEALTH CARE MAINTENANCE: ICD-10-CM

## 2025-05-21 DIAGNOSIS — E11.65 POORLY CONTROLLED TYPE 2 DIABETES MELLITUS (H): Primary | ICD-10-CM

## 2025-05-21 DIAGNOSIS — L82.1 SEBORRHEIC KERATOSIS: ICD-10-CM

## 2025-05-21 DIAGNOSIS — I10 ESSENTIAL HYPERTENSION: ICD-10-CM

## 2025-05-21 DIAGNOSIS — E78.00 HYPERCHOLESTEROLEMIA: ICD-10-CM

## 2025-05-21 LAB
EST. AVERAGE GLUCOSE BLD GHB EST-MCNC: 255 MG/DL
HBA1C MFR BLD: 10.5 % (ref 0–5.6)
HOLD SPECIMEN: NORMAL

## 2025-05-21 PROCEDURE — 3078F DIAST BP <80 MM HG: CPT | Performed by: FAMILY MEDICINE

## 2025-05-21 PROCEDURE — 36415 COLL VENOUS BLD VENIPUNCTURE: CPT | Performed by: FAMILY MEDICINE

## 2025-05-21 PROCEDURE — 99214 OFFICE O/P EST MOD 30 MIN: CPT | Performed by: FAMILY MEDICINE

## 2025-05-21 PROCEDURE — G2211 COMPLEX E/M VISIT ADD ON: HCPCS | Performed by: FAMILY MEDICINE

## 2025-05-21 PROCEDURE — 83036 HEMOGLOBIN GLYCOSYLATED A1C: CPT | Performed by: FAMILY MEDICINE

## 2025-05-21 PROCEDURE — 3077F SYST BP >= 140 MM HG: CPT | Performed by: FAMILY MEDICINE

## 2025-05-21 PROCEDURE — 3046F HEMOGLOBIN A1C LEVEL >9.0%: CPT | Performed by: FAMILY MEDICINE

## 2025-05-21 NOTE — Clinical Note
When is his follow-up scheduled for annual wellness visit I want to make sure he is seen in about 1 to 2 months to recheck his blood pressure because it was uncontrolled today and I do think he might need medication titration-we can do this at his annual wellness visit or if that will be too far out-schedule - plz something sooner.

## 2025-05-21 NOTE — PROGRESS NOTES
Assessment & Plan  Poorly controlled type 2 diabetes mellitus (H)  Diabetic control-A1c is risen from 7.8-10.5.  This may be dietary.  He does endorse eating more simple carbohydrates.  Continue Glucotrol 10 mg p.o. daily-24-hour tablet  Continue metformin 1000 mg p.o. twice daily  He had to discontinue Jardiance 10 mg p.o. daily due to cost-discontinued  GLP-1 receptor agonist has also been cost prohibitive.  At this time I will initiate Lantus 10 units nightly and refer to diabetes education for further titration.  He also needs to follow-up with [facility] endocrinology and is due for this follow-up but has not yet scheduled it.  He says he will schedule this.    We also discussed that his diabetic eye exam is due and they will schedule this as well.    Orders:    Adult Eye  Referral; Future    Hemoglobin A1c; Future    Hemoglobin A1c    Adult Diabetes Education  Referral; Future    insulin glargine (LANTUS PEN) 100 UNIT/ML pen; Inject 10 Units subcutaneously at bedtime.    Seborrheic keratosis  He has several lesions that appear to be seborrheic keratoses on his head-referral to dermatology is made.  Orders:    Adult Dermatology  Referral; Future    [facility] maintenance  For annual wellness visit-will schedule.         Hypertension  Hypertension-poorly controlled today's blood pressure is 154/79-will monitor and titrate medication.  Continue Norvasc 10 mg p.o. daily  Continue hydrochlorothiazide 12.5 mg p.o. daily  Continue lisinopril 40 mg p.o. daily  For increasing hydrochlorothiazide or lisinopril at follow-up.  I would like to follow-up in the next 1 month.         Benign prostatic hyperplasia with lower urinary tract symptoms, symptom details unspecified  BPH continue Flomax 0.4 mg p.o. daily         Hypercholesterolemia  hyperlipidemia-continue Lipitor 80 mg p.o. daily.           Subjective   Chet is a 74 year old, presenting for the following health issues:  Follow Up (DM  "and A1C)      5/21/2025    10:42 AM   Additional Questions   Roomed by Tomás Oneal MA   Accompanied by wife         5/21/2025    10:42 AM   Patient Reported Additional Medications   Patient reports taking the following new medications None     History of Present Illness       Diabetes:   He presents for follow up of diabetes.  He is checking home blood glucose three times daily.   He checks blood glucose before and after meals.  Blood glucose is sometimes over 200 and never under 70.  When his blood glucose is low, the patient is asymptomatic for confusion, blurred vision, lethargy and reports not feeling dizzy, shaky, or weak.  He is concerned about blood sugar frequently over 200.    He is not experiencing numbness or burning in feet, excessive thirst, blurry vision, weight changes or redness, sores or blisters on feet. The patient has not had a diabetic eye exam in the last 12 months.          He eats 2-3 servings of fruits and vegetables daily.He consumes 0 sweetened beverage(s) daily.He exercises with enough effort to increase his heart rate 9 or less minutes per day.  He exercises with enough effort to increase his heart rate 3 or less days per week.   He is taking medications regularly.              Objective    BP (!) 154/79 (BP Location: Left arm, Patient Position: Sitting, Cuff Size: Adult Regular)   Pulse 89   Temp 98.7  F (37.1  C) (Oral)   Resp 14   Ht 1.721 m (5' 7.75\")   Wt 97.7 kg (215 lb 6.4 oz)   SpO2 97%   BMI 32.99 kg/m    Body mass index is 32.99 kg/m .  Physical Exam  Constitutional:       Appearance: Normal appearance.   HENT:      Head: Normocephalic and atraumatic.   Cardiovascular:      Rate and Rhythm: Normal rate and regular rhythm.   Pulmonary:      Effort: Pulmonary effort is normal.   Musculoskeletal:         General: Normal range of motion.      Cervical back: Normal range of motion and neck supple.   Skin:     Comments: Several scaly, dry blackish brown stuck on lesions noted " on scalp measuring 1-2 cm in diameter.    Neurological:      General: No focal deficit present.      Mental Status: He is alert.                Signed Electronically by: Priti Charles MD

## 2025-05-21 NOTE — ASSESSMENT & PLAN NOTE
Hypertension-poorly controlled today's blood pressure is 154/79-will monitor and titrate medication.  Continue Norvasc 10 mg p.o. daily  Continue hydrochlorothiazide 12.5 mg p.o. daily  Continue lisinopril 40 mg p.o. daily  For increasing hydrochlorothiazide or lisinopril at follow-up.  I would like to follow-up in the next 1 month.

## 2025-05-21 NOTE — ASSESSMENT & PLAN NOTE
He has several lesions that appear to be seborrheic keratoses on his head-referral to dermatology is made.  Orders:    Adult Dermatology  Referral; Future

## 2025-05-21 NOTE — ASSESSMENT & PLAN NOTE
Diabetic control-A1c is risen from 7.8-10.5.  This may be dietary.  He does endorse eating more simple carbohydrates.  Continue Glucotrol 10 mg p.o. daily-24-hour tablet  Continue metformin 1000 mg p.o. twice daily  He had to discontinue Jardiance 10 mg p.o. daily due to cost-discontinued  GLP-1 receptor agonist has also been cost prohibitive.  At this time I will initiate Lantus 10 units nightly and refer to diabetes education for further titration.  He also needs to follow-up with [facility] endocrinology and is due for this follow-up but has not yet scheduled it.  He says he will schedule this.    We also discussed that his diabetic eye exam is due and they will schedule this as well.    Orders:    Adult Eye  Referral; Future    Hemoglobin A1c; Future    Hemoglobin A1c    Adult Diabetes Education  Referral; Future    insulin glargine (LANTUS PEN) 100 UNIT/ML pen; Inject 10 Units subcutaneously at bedtime.

## 2025-05-22 ENCOUNTER — PATIENT OUTREACH (OUTPATIENT)
Dept: CARE COORDINATION | Facility: CLINIC | Age: 75
End: 2025-05-22
Payer: COMMERCIAL

## 2025-05-26 ENCOUNTER — PATIENT OUTREACH (OUTPATIENT)
Dept: CARE COORDINATION | Facility: CLINIC | Age: 75
End: 2025-05-26
Payer: COMMERCIAL

## 2025-05-28 ENCOUNTER — MYC MEDICAL ADVICE (OUTPATIENT)
Dept: FAMILY MEDICINE | Facility: CLINIC | Age: 75
End: 2025-05-28
Payer: COMMERCIAL

## 2025-06-05 DIAGNOSIS — E11.9 TYPE 2 DIABETES MELLITUS WITHOUT COMPLICATION, UNSPECIFIED WHETHER LONG TERM INSULIN USE (H): ICD-10-CM

## 2025-06-18 ENCOUNTER — TELEPHONE (OUTPATIENT)
Dept: FAMILY MEDICINE | Facility: CLINIC | Age: 75
End: 2025-06-18
Payer: COMMERCIAL

## 2025-06-18 NOTE — TELEPHONE ENCOUNTER
Patient Quality Outreach    Patient is due for the following:   Diabetes -  A1C and Diabetic Follow-Up Visit    Action(s) Taken:   Left message.    Type of outreach:    Phone, left message for patient/parent to call back.    Questions for provider review:    None         Tomás Oneal MA  Chart routed to None.         none

## 2025-06-22 DIAGNOSIS — I10 ESSENTIAL HYPERTENSION: ICD-10-CM

## 2025-06-22 DIAGNOSIS — N40.1 BENIGN PROSTATIC HYPERPLASIA WITH LOWER URINARY TRACT SYMPTOMS, SYMPTOM DETAILS UNSPECIFIED: ICD-10-CM

## 2025-06-22 DIAGNOSIS — E78.2 MIXED HYPERLIPIDEMIA: ICD-10-CM

## 2025-06-23 RX ORDER — TAMSULOSIN HYDROCHLORIDE 0.4 MG/1
0.4 CAPSULE ORAL DAILY
Qty: 30 CAPSULE | Refills: 0 | Status: SHIPPED | OUTPATIENT
Start: 2025-06-23

## 2025-06-23 RX ORDER — ATORVASTATIN CALCIUM 80 MG/1
80 TABLET, FILM COATED ORAL DAILY
Qty: 90 TABLET | Refills: 0 | Status: SHIPPED | OUTPATIENT
Start: 2025-06-23

## 2025-06-23 RX ORDER — AMLODIPINE BESYLATE 10 MG/1
10 TABLET ORAL DAILY
Qty: 30 TABLET | Refills: 0 | Status: SHIPPED | OUTPATIENT
Start: 2025-06-23

## 2025-06-25 DIAGNOSIS — I10 ESSENTIAL HYPERTENSION: ICD-10-CM

## 2025-06-25 RX ORDER — LISINOPRIL 40 MG/1
40 TABLET ORAL DAILY
Qty: 30 TABLET | Refills: 0 | Status: SHIPPED | OUTPATIENT
Start: 2025-06-25

## 2025-07-07 SDOH — HEALTH STABILITY: PHYSICAL HEALTH: ON AVERAGE, HOW MANY MINUTES DO YOU ENGAGE IN EXERCISE AT THIS LEVEL?: 10 MIN

## 2025-07-07 SDOH — HEALTH STABILITY: PHYSICAL HEALTH: ON AVERAGE, HOW MANY DAYS PER WEEK DO YOU ENGAGE IN MODERATE TO STRENUOUS EXERCISE (LIKE A BRISK WALK)?: 3 DAYS

## 2025-07-07 ASSESSMENT — SOCIAL DETERMINANTS OF HEALTH (SDOH): HOW OFTEN DO YOU GET TOGETHER WITH FRIENDS OR RELATIVES?: ONCE A WEEK

## 2025-07-08 ENCOUNTER — OFFICE VISIT (OUTPATIENT)
Dept: FAMILY MEDICINE | Facility: CLINIC | Age: 75
End: 2025-07-08
Payer: COMMERCIAL

## 2025-07-08 VITALS
RESPIRATION RATE: 18 BRPM | HEIGHT: 68 IN | WEIGHT: 208.2 LBS | BODY MASS INDEX: 31.55 KG/M2 | OXYGEN SATURATION: 97 % | SYSTOLIC BLOOD PRESSURE: 138 MMHG | DIASTOLIC BLOOD PRESSURE: 87 MMHG | HEART RATE: 86 BPM | TEMPERATURE: 98.4 F

## 2025-07-08 DIAGNOSIS — I10 ESSENTIAL HYPERTENSION: ICD-10-CM

## 2025-07-08 DIAGNOSIS — E66.09 CLASS 1 OBESITY DUE TO EXCESS CALORIES WITH SERIOUS COMORBIDITY AND BODY MASS INDEX (BMI) OF 31.0 TO 31.9 IN ADULT: ICD-10-CM

## 2025-07-08 DIAGNOSIS — E11.65 POORLY CONTROLLED TYPE II DIABETES MELLITUS WITH RENAL COMPLICATION (H): ICD-10-CM

## 2025-07-08 DIAGNOSIS — Z53.20 TREATMENT DECLINED BY PATIENT: ICD-10-CM

## 2025-07-08 DIAGNOSIS — C18.2 MALIGNANT NEOPLASM OF ASCENDING COLON (H): ICD-10-CM

## 2025-07-08 DIAGNOSIS — E78.00 HYPERCHOLESTEROLEMIA: ICD-10-CM

## 2025-07-08 DIAGNOSIS — R19.03 RLQ ABDOMINAL MASS: ICD-10-CM

## 2025-07-08 DIAGNOSIS — Z85.528 HISTORY OF PRIMARY MALIGNANT NEOPLASM OF KIDNEY: ICD-10-CM

## 2025-07-08 DIAGNOSIS — N20.2 CALCULUS OF KIDNEY AND URETER: ICD-10-CM

## 2025-07-08 DIAGNOSIS — E55.9 VITAMIN D DEFICIENCY: ICD-10-CM

## 2025-07-08 DIAGNOSIS — E66.811 CLASS 1 OBESITY DUE TO EXCESS CALORIES WITH SERIOUS COMORBIDITY AND BODY MASS INDEX (BMI) OF 31.0 TO 31.9 IN ADULT: ICD-10-CM

## 2025-07-08 DIAGNOSIS — Z00.00 ENCOUNTER FOR MEDICARE ANNUAL WELLNESS EXAM: Primary | ICD-10-CM

## 2025-07-08 DIAGNOSIS — E11.29 POORLY CONTROLLED TYPE II DIABETES MELLITUS WITH RENAL COMPLICATION (H): ICD-10-CM

## 2025-07-08 DIAGNOSIS — Z00.00 HEALTH CARE MAINTENANCE: ICD-10-CM

## 2025-07-08 DIAGNOSIS — Z23 NEED FOR VACCINATION: ICD-10-CM

## 2025-07-08 DIAGNOSIS — D50.9 MICROCYTIC ANEMIA: ICD-10-CM

## 2025-07-08 PROBLEM — C18.9 COLON CANCER (H): Status: ACTIVE | Noted: 2021-07-30

## 2025-07-08 LAB
ALBUMIN SERPL BCG-MCNC: 4.3 G/DL (ref 3.5–5.2)
ALP SERPL-CCNC: 55 U/L (ref 40–150)
ALT SERPL W P-5'-P-CCNC: 20 U/L (ref 0–70)
ANION GAP SERPL CALCULATED.3IONS-SCNC: 9 MMOL/L (ref 7–15)
AST SERPL W P-5'-P-CCNC: 23 U/L (ref 0–45)
BILIRUB SERPL-MCNC: 0.6 MG/DL
BUN SERPL-MCNC: 11.9 MG/DL (ref 8–23)
CALCIUM SERPL-MCNC: 9.3 MG/DL (ref 8.8–10.4)
CHLORIDE SERPL-SCNC: 103 MMOL/L (ref 98–107)
CHOLEST SERPL-MCNC: 121 MG/DL
CREAT SERPL-MCNC: 0.56 MG/DL (ref 0.67–1.17)
EGFRCR SERPLBLD CKD-EPI 2021: >90 ML/MIN/1.73M2
ERYTHROCYTE [DISTWIDTH] IN BLOOD BY AUTOMATED COUNT: 12.3 % (ref 10–15)
FASTING STATUS PATIENT QL REPORTED: YES
FASTING STATUS PATIENT QL REPORTED: YES
GLUCOSE SERPL-MCNC: 112 MG/DL (ref 70–99)
HCO3 SERPL-SCNC: 27 MMOL/L (ref 22–29)
HCT VFR BLD AUTO: 43.6 % (ref 40–53)
HDLC SERPL-MCNC: 57 MG/DL
HGB BLD-MCNC: 14.7 G/DL (ref 13.3–17.7)
LDLC SERPL CALC-MCNC: 54 MG/DL
MCH RBC QN AUTO: 31.4 PG (ref 26.5–33)
MCHC RBC AUTO-ENTMCNC: 33.7 G/DL (ref 31.5–36.5)
MCV RBC AUTO: 93 FL (ref 78–100)
NONHDLC SERPL-MCNC: 64 MG/DL
PLATELET # BLD AUTO: 187 10E3/UL (ref 150–450)
POTASSIUM SERPL-SCNC: 4 MMOL/L (ref 3.4–5.3)
PROT SERPL-MCNC: 6.9 G/DL (ref 6.4–8.3)
RBC # BLD AUTO: 4.68 10E6/UL (ref 4.4–5.9)
SODIUM SERPL-SCNC: 139 MMOL/L (ref 135–145)
TRIGL SERPL-MCNC: 50 MG/DL
VIT D+METAB SERPL-MCNC: 47 NG/ML (ref 20–50)
WBC # BLD AUTO: 6.4 10E3/UL (ref 4–11)

## 2025-07-08 NOTE — ASSESSMENT & PLAN NOTE
Diabetic control-A1c is risen from 7.8-10.5. - not due for repeat  This may be dietary.  He does endorse eating more simple carbohydrates.  Continue Glucotrol 10 mg p.o. daily-24-hour tablet  Continue metformin 1000 mg p.o. twice daily  He had to discontinue Jardiance 10 mg p.o. daily due to cost-discontinued  GLP-1 receptor agonist has also been cost prohibitive.  At this time I will initiate Lantus 10 units nightly and refer to diabetes education for further titration.      Saw dm ed 6/27/2025 and follow-up planned 9/2025     We also discussed that his diabetic eye exam is due and they will schedule this as well - not scheduled yet (busy with wifes liver biopsy)    He is asked to follow-up with Angela Garner - endocrinology -- uncontrolled dm.   And follow-up with me in 3 months

## 2025-07-08 NOTE — ASSESSMENT & PLAN NOTE
He had seen Dr. Lopez, last he saw Dr. Lopez was 2016 and follow-up was recommended by 2018 they say they didn't go back and they decline follow-up now too.

## 2025-07-08 NOTE — ASSESSMENT & PLAN NOTE
He is currently declining treatment by oncology for colon (declines rad/ chemo) and kidney cancer (declines follow-up with Dr. Lopez/ et al.  )  I am concerned due to 7 lb weight loss since 5/21/2025 and he states he is not concerned and doesn't want follow-up on this. Thinks it is due to his healthy eating.   He agreed to colonoscopy, but not now as he is too busy with wife's liver biopsy tomorrow.   Declines follow-up with dr. Zuniga for kidney stones/ prostate.

## 2025-07-08 NOTE — ASSESSMENT & PLAN NOTE
He has lost weight loss 7 lbs down over past one month. I did express concern that colon cancer could be back, he insists he is eating very carefully. I did recommend follow-up on colon cancer but he says 'I'll think about that'

## 2025-07-08 NOTE — ASSESSMENT & PLAN NOTE
PSA: declined  Recommended vaccines: rsv  Colonoscopy: due, ordered   Std testing desired:  offered  Osteoporosis prevention discussed. Recommend daily calcium and vitamin d intake to keep good bone health. Recommend weight bearing exercise  no tobacco, and limit caffeine  and alcohol   Dexa- declined.   Recommend sunscreen, exercise, & healthy diet.   Labs reviewed  Fasting:  no  I have had an Advance Directives discussion with the patient. Declined info.   Body mass index is 31.66 kg/m .

## 2025-07-08 NOTE — ASSESSMENT & PLAN NOTE
He is declining any follow up on this. Had declined chemo and radiation in the past.     Orders:    Adult GI  Referral - Procedure Only; Future

## 2025-07-08 NOTE — PATIENT INSTRUCTIONS
Patient Education   Preventive Care Advice   This is general advice given by our system to help you stay healthy. However, your care team may have specific advice just for you. Please talk to your care team about your preventive care needs.  Nutrition  Eat 5 or more servings of fruits and vegetables each day.  Try wheat bread, brown rice and whole grain pasta (instead of white bread, rice, and pasta).  Get enough calcium and vitamin D. Check the label on foods and aim for 100% of the RDA (recommended daily allowance).  Lifestyle  Exercise at least 150 minutes each week  (30 minutes a day, 5 days a week).  Do muscle strengthening activities 2 days a week. These help control your weight and prevent disease.  No smoking.  Wear sunscreen to prevent skin cancer.  Have a dental exam and cleaning every 6 months.  Yearly exams  See your health care team every year to talk about:  Any changes in your health.  Any medicines your care team has prescribed.  Preventive care, family planning, and ways to prevent chronic diseases.  Shots (vaccines)   HPV shots (up to age 26), if you've never had them before.  Hepatitis B shots (up to age 59), if you've never had them before.  COVID-19 shot: Get this shot when it's due.  Flu shot: Get a flu shot every year.  Tetanus shot: Get a tetanus shot every 10 years.  Pneumococcal, hepatitis A, and RSV shots: Ask your care team if you need these based on your risk.  Shingles shot (for age 50 and up)  General health tests  Diabetes screening:  Starting at age 35, Get screened for diabetes at least every 3 years.  If you are younger than age 35, ask your care team if you should be screened for diabetes.  Cholesterol test: At age 39, start having a cholesterol test every 5 years, or more often if advised.  Bone density scan (DEXA): At age 50, ask your care team if you should have this scan for osteoporosis (brittle bones).  Hepatitis C: Get tested at least once in your life.  STIs (sexually  transmitted infections)  Before age 24: Ask your care team if you should be screened for STIs.  After age 24: Get screened for STIs if you're at risk. You are at risk for STIs (including HIV) if:  You are sexually active with more than one person.  You don't use condoms every time.  You or a partner was diagnosed with a sexually transmitted infection.  If you are at risk for HIV, ask about PrEP medicine to prevent HIV.  Get tested for HIV at least once in your life, whether you are at risk for HIV or not.  Cancer screening tests  Cervical cancer screening: If you have a cervix, begin getting regular cervical cancer screening tests starting at age 21.  Breast cancer scan (mammogram): If you've ever had breasts, begin having regular mammograms starting at age 40. This is a scan to check for breast cancer.  Colon cancer screening: It is important to start screening for colon cancer at age 45.  Have a colonoscopy test every 10 years (or more often if you're at risk) Or, ask your provider about stool tests like a FIT test every year or Cologuard test every 3 years.  To learn more about your testing options, visit:   .  For help making a decision, visit:   https://bit.ly/gh39264.  Prostate cancer screening test: If you have a prostate, ask your care team if a prostate cancer screening test (PSA) at age 55 is right for you.  Lung cancer screening: If you are a current or former smoker ages 50 to 80, ask your care team if ongoing lung cancer screenings are right for you.  For informational purposes only. Not to replace the advice of your health care provider. Copyright   2023 [facility] [employer]. All rights reserved. Clinically reviewed by the [facility] Transitions Program. [employer] 750209 - REV 01/24.  Hearing Loss: Care Instructions  Overview     Hearing loss is a sudden or slow decrease in how well you hear. It can range from slight to profound. Permanent hearing loss can occur with aging. It also can  happen when you are exposed long-term to loud noise. Examples include listening to loud music, riding motorcycles, or being around other loud machines.  Hearing loss can affect your work and home life. It can make you feel lonely or depressed. You may feel that you have lost your independence. But hearing aids and other devices can help you hear better and feel connected to others.  Follow-up care is a key part of your treatment and safety. Be sure to make and go to all appointments, and call your doctor if you are having problems. It's also a good idea to know your test results and keep a list of the medicines you take.  How can you care for yourself at home?  Avoid loud noises whenever possible. This helps keep your hearing from getting worse.  Always wear hearing protection around loud noises.  Wear a hearing aid as directed.  A professional can help you pick a hearing aid that will work best for you.  You can also get hearing aids over the counter for mild to moderate hearing loss.  Have hearing tests as your doctor suggests. They can show whether your hearing has changed. Your hearing aid may need to be adjusted.  Use other devices as needed. These may include:  Telephone amplifiers and hearing aids that can connect to a television, stereo, radio, or microphone.  Devices that use lights or vibrations. These alert you to the doorbell, a ringing telephone, or a baby monitor.  Television closed-captioning. This shows the words at the bottom of the screen. Most new TVs can do this.  TTY (text telephone). This lets you type messages back and forth on the telephone instead of talking or listening. These devices are also called TDD. When messages are typed on the keyboard, they are sent over the phone line to a receiving TTY. The message is shown on a monitor.  Use text messaging, social media, and email if it is hard for you to communicate by telephone.  Try to learn a listening technique called speechreading. It is  "not lipreading. You pay attention to people's gestures, expressions, posture, and tone of voice. These clues can help you understand what a person is saying. Face the person you are talking to, and have them face you. Make sure the lighting is good. You need to see the other person's face clearly.  Think about counseling if you need help to adjust to your hearing loss.  When should you call for help?  Watch closely for changes in your health, and be sure to contact your doctor if:    You think your hearing is getting worse.     You have new symptoms, such as dizziness or nausea.   Where can you learn more?  Go to https://www.[employer].net/patiented  Enter R798 in the search box to learn more about \"Hearing Loss: Care Instructions.\"  Current as of: October 27, 2024  Content Version: 14.5    0429-9007 [employer].   Care instructions adapted under license by your healthcare professional. If you have questions about a medical condition or this instruction, always ask your healthcare professional. [employer] disclaims any warranty or liability for your use of this information.       "

## 2025-07-08 NOTE — ASSESSMENT & PLAN NOTE
He states he had a scan a month ago ( I do not have these records), and he was told no follow-up needed by Dr. Jack office - I have no notes on this. I recommended double checking with Dr. Zuniga to see what follow-up is recommended.

## 2025-07-08 NOTE — ASSESSMENT & PLAN NOTE
Hypertension-controlled bp 138/87  Continue Norvasc 10 mg p.o. daily  Continue hydrochlorothiazide 12.5 mg p.o. daily  Continue lisinopril 40 mg p.o. daily  For increasing hydrochlorothiazide or lisinopril at follow-up.  I would like to follow-up in the next 1 month.      Orders:    Comprehensive metabolic panel (BMP + Alb, Alk Phos, ALT, AST, Total. Bili, TP); Future

## 2025-07-08 NOTE — PROGRESS NOTES
Preventive Care Visit  [facility]  Priti Charles MD, Family Medicine  Jul 8, 2025    In addition to the preventive service, I spent 20 minutes discussing the patient's mutiple medical problems, weight loss and follow up with oncology/ urology/ kidney stone specialist etc.      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 - not due for repeat  This may be dietary.  He does endorse eating more simple carbohydrates.  Continue Glucotrol 10 mg p.o. daily-24-hour tablet  Continue metformin 1000 mg p.o. twice daily  He had to discontinue Jardiance 10 mg p.o. daily due to cost-discontinued  GLP-1 receptor agonist has also been cost prohibitive.  At this time I will initiate Lantus 10 units nightly and refer to diabetes education for further titration.      Saw dm ed 6/27/2025 and follow-up planned 9/2025     We also discussed that his diabetic eye exam is due and they will schedule this as well - not scheduled yet (busy with wifes liver biopsy)    He is asked to follow-up with Angela Garner - endocrinology -- uncontrolled dm.   And follow-up with me in 3 months          Treatment declined by patient  He is currently declining treatment by oncology for colon (declines rad/ chemo) and kidney cancer (declines follow-up with Dr. Lopez/ et al.  )  I am concerned due to 7 lb weight loss since 5/21/2025 and he states he is not concerned and doesn't want follow-up on this. Thinks it is due to his healthy eating.   He agreed to colonoscopy, but not now as he is too busy with wife's liver biopsy tomorrow.   Declines follow-up with dr. Zuniga for kidney stones/ prostate.          Health care maintenance  PSA: declined  Recommended vaccines: rsv  Colonoscopy: due, ordered   Std testing desired:  offered  Osteoporosis prevention discussed. Recommend daily calcium and  vitamin d intake to keep good bone health. Recommend weight bearing exercise  no tobacco, and limit caffeine  and alcohol   Dexa- declined.   Recommend sunscreen, exercise, & healthy diet.   Labs reviewed  Fasting:  no  I have had an Advance Directives discussion with the patient. Declined info.   Body mass index is 31.66 kg/m .            Need for vaccination         Malignant neoplasm of ascending colon (H)  He is declining any follow up on this. Had declined chemo and radiation in the past.     Orders:    Adult GI  Referral - Procedure Only; Future    History of primary malignant neoplasm of kidney  He had seen Dr. Lopez, last he saw Dr. Lopez was 2016 and follow-up was recommended by 2018 they say they didn't go back and they decline follow-up now too.          Hypertension  Hypertension-controlled bp 138/87  Continue Norvasc 10 mg p.o. daily  Continue hydrochlorothiazide 12.5 mg p.o. daily  Continue lisinopril 40 mg p.o. daily  For increasing hydrochlorothiazide or lisinopril at follow-up.  I would like to follow-up in the next 1 month.      Orders:    Comprehensive metabolic panel (BMP + Alb, Alk Phos, ALT, AST, Total. Bili, TP); Future    Hypercholesterolemia  hyperlipidemia-continue Lipitor 80 mg p.o. daily.      Orders:    Lipid Profile; Future    Vitamin D deficiency  Was abnormal, will recheck - he did decrease dose since level was high    Orders:    Vitamin D Deficiency; Future    Class 1 obesity due to excess calories with serious comorbidity and body mass index (BMI) of 31.0 to 31.9 in adult  He has lost weight loss 7 lbs down over past one month. I did express concern that colon cancer could be back, he insists he is eating very carefully. I did recommend follow-up on colon cancer but he says 'I'll think about that'         Microcytic anemia  Repeat cbc to trend    Orders:    CBC with platelets; Future    RLQ abdominal mass  Resolved - post kidney cancer surgery         Calculus of kidney  and ureter  He states he had a scan a month ago ( I do not have these records), and he was told no follow-up needed by Dr. Jack office - I have no notes on this. I recommended double checking with Dr. Zuniga to see what follow-up is recommended.               Subjective   Chet is a 75 year old, presenting for the following:  Physical        7/8/2025     9:31 AM   Additional Questions   Roomed by Tiesha Stacy         7/8/2025   Forms   Any forms needing to be completed Yes            Advance Care Planning    Discussed advance care planning with patient; however, patient declined at this time.        7/7/2025   General Health   How would you rate your overall physical health? Good   Feel stress (tense, anxious, or unable to sleep) Not at all         7/7/2025   Nutrition   Diet: Regular (no restrictions)         7/7/2025   Exercise   Days per week of moderate/strenous exercise 3 days   Average minutes spent exercising at this level 10 min         7/7/2025   Social Factors   Frequency of gathering with friends or relatives Once a week   Worry food won't last until get money to buy more No   Food not last or not have enough money for food? No   Do you have housing? (Housing is defined as stable permanent housing and does not include staying outside in a car, in a tent, in an abandoned building, in an overnight shelter, or couch-surfing.) Yes   Are you worried about losing your housing? No   Lack of transportation? No   Unable to get utilities (heat,electricity)? No         7/7/2025   Fall Risk   Fallen 2 or more times in the past year? No   Trouble with walking or balance? No          7/7/2025   Activities of Daily Living- Home Safety   Needs help with the following daily activites None of the above   Safety concerns in the home None of the above         7/7/2025   Dental   Dentist two times every year? (!) NO         7/7/2025   Hearing Screening   Hearing concerns? (!) I FEEL THAT PEOPLE ARE MUMBLING OR NOT  SPEAKING CLEARLY.    (!) I NEED TO ASK PEOPLE TO SPEAK UP OR REPEAT THEMSELVES.   Would you like a referral for hearing testing? No       Multiple values from one day are sorted in reverse-chronological order         7/7/2025   Driving Risk Screening   Patient/family members have concerns about driving No         7/7/2025   General Alertness/Fatigue Screening   Have you been more tired than usual lately? No         7/7/2025   Urinary Incontinence Screening   Bothered by leaking urine in past 6 months No         Today's PHQ-2 Score:       7/7/2025     6:58 PM   PHQ-2 ( 1999 [employer])   Q1: Little interest or pleasure in doing things 0   Q2: Feeling down, depressed or hopeless 0   PHQ-2 Score 0    Q1: Little interest or pleasure in doing things Not at all   Q2: Feeling down, depressed or hopeless Not at all   PHQ-2 Score 0       Patient-reported           7/7/2025   Substance Use   Alcohol more than 3/day or more than 7/wk No   Do you have a current opioid prescription? No   How severe/bad is pain from 1 to 10? 0/10 (No Pain)   Do you use any other substances recreationally? No     Social History     Tobacco Use    Smoking status: Former     Passive exposure: Current    Smokeless tobacco: Never   Vaping Use    Vaping status: Never Used   Substance Use Topics    Alcohol use: Yes     Alcohol/week: 1.0 standard drink of alcohol     Types: 1 Glasses of wine per week     Comment: VERY LITTLE    Drug use: Never       ASCVD Risk   The 10-year ASCVD risk score (Naseem PEREIRA, et al., 2019) is: 42.4%    Values used to calculate the score:      Age: 75 years      Sex: Male      Is Non- : No      Diabetic: Yes      Tobacco smoker: No      Systolic Blood Pressure: 138 mmHg      Is BP treated: Yes      HDL Cholesterol: 63 mg/dL      Total Cholesterol: 141 mg/dL      Reviewed and updated as needed this visit by Provider                      Current providers sharing in care for this patient  "include:  Patient Care Team:  Priti Charles MD as PCP - General  StackAna MD as MD  Haritha Patel, PharmD as MTM Pharmacist (Pharmacist)  Yamilet Hess PT as Physical Therapist (Physical Therapy)  Angela Garner, YUDELKA as Nurse Practitioner  Duong Pérez MD as MD (Hematology)  Priti Charles MD as Assigned PCP  Zaria Kent, RN as Registered Nurse (Diabetes Education)    The following health maintenance items are reviewed in Epic and correct as of today:  Health Maintenance   Topic Date Due    RSV VACCINE (1 - Risk 60-74 years 1-dose series) Never done    LUNG CANCER SCREENING  05/23/2012    EYE EXAM  02/16/2024    COVID-19 VACCINE (6 - 2024-25 season) 09/01/2024    MEDICARE ANNUAL WELLNESS VISIT  04/10/2025    LIPID  04/10/2025    DIABETIC FOOT EXAM  04/10/2025    A1C  08/21/2025    INFLUENZA VACCINE (1) 09/01/2025    BMP  10/28/2025    MICROALBUMIN  10/28/2025    COLORECTAL CANCER SCREENING  11/03/2025    ANNUAL REVIEW OF HM ORDERS  05/21/2026    FALL RISK ASSESSMENT  07/08/2026    ADVANCE CARE PLANNING  04/10/2029    DTAP/TDAP/TD VACCINE (3 - Td or Tdap) 01/03/2033    HEPATITIS C SCREENING  Completed    PHQ-2 (once per calendar year)  Completed    PNEUMOCOCCAL VACCINE 50+ YEARS  Completed    ZOSTER VACCINE  Completed    AORTIC ANEURYSM SCREENING (SYSTEM ASSIGNED)  Completed    HPV VACCINE  Aged Out    MENINGITIS VACCINE  Aged Out            Objective    Exam  /87 (BP Location: Left arm, Patient Position: Left side, Cuff Size: Adult Large)   Pulse 86   Temp 98.4  F (36.9  C)   Resp 18   Ht 1.727 m (5' 8\")   Wt 94.4 kg (208 lb 3.2 oz)   SpO2 97%   BMI 31.66 kg/m     Estimated body mass index is 31.66 kg/m  as calculated from the following:    Height as of this encounter: 1.727 m (5' 8\").    Weight as of this encounter: 94.4 kg (208 lb 3.2 oz).    Physical Exam  Constitutional:       Appearance: Normal appearance.   HENT:      Head: Normocephalic and atraumatic. "   Cardiovascular:      Rate and Rhythm: Normal rate and regular rhythm.   Pulmonary:      Effort: Pulmonary effort is normal.   Musculoskeletal:         General: Normal range of motion.      Cervical back: Normal range of motion and neck supple.   Neurological:      General: No focal deficit present.      Mental Status: He is alert.               7/8/2025   Mini Cog   Clock Draw Score 2 Normal   3 Item Recall 3 objects recalled   Mini Cog Total Score 5         Signed Electronically by: Priti Charles MD

## 2025-07-10 NOTE — TELEPHONE ENCOUNTER
Patient Quality Outreach    Patient is due for the following:   Diabetes -  A1C and Diabetic Follow-Up Visit    Action(s) Taken:   No follow up needed at this time.    Type of outreach:    Chart review performed, no outreach needed.    Questions for provider review:    None         Tomás Oneal MA  Chart routed to None.

## 2025-07-17 DIAGNOSIS — I10 ESSENTIAL HYPERTENSION: ICD-10-CM

## 2025-07-17 RX ORDER — HYDROCHLOROTHIAZIDE 12.5 MG/1
12.5 TABLET ORAL DAILY
Qty: 90 TABLET | Refills: 0 | Status: SHIPPED | OUTPATIENT
Start: 2025-07-17

## 2025-07-23 PROBLEM — N18.1 CKD (CHRONIC KIDNEY DISEASE) STAGE 1, GFR 90 ML/MIN OR GREATER: Status: ACTIVE | Noted: 2025-07-23

## 2025-07-28 DIAGNOSIS — I10 ESSENTIAL HYPERTENSION: ICD-10-CM

## 2025-07-28 DIAGNOSIS — N40.1 BENIGN PROSTATIC HYPERPLASIA WITH LOWER URINARY TRACT SYMPTOMS, SYMPTOM DETAILS UNSPECIFIED: ICD-10-CM

## 2025-07-28 RX ORDER — TAMSULOSIN HYDROCHLORIDE 0.4 MG/1
0.4 CAPSULE ORAL DAILY
Qty: 90 CAPSULE | Refills: 2 | Status: SHIPPED | OUTPATIENT
Start: 2025-07-28

## 2025-07-28 RX ORDER — AMLODIPINE BESYLATE 10 MG/1
10 TABLET ORAL DAILY
Qty: 90 TABLET | Refills: 2 | Status: SHIPPED | OUTPATIENT
Start: 2025-07-28

## 2025-07-28 RX ORDER — LISINOPRIL 40 MG/1
40 TABLET ORAL DAILY
Qty: 90 TABLET | Refills: 2 | Status: SHIPPED | OUTPATIENT
Start: 2025-07-28

## 2025-08-30 ENCOUNTER — HEALTH MAINTENANCE LETTER (OUTPATIENT)
Age: 75
End: 2025-08-30

## (undated) DEVICE — SOL WATER IRRIG 1000ML BOTTLE 2F7114

## (undated) DEVICE — GUIDEWIRE SENSOR DUAL FLEX STR 0.035"X150CM M0066703080

## (undated) DEVICE — SUCTION TIP POOLE STERILE 35040

## (undated) DEVICE — SU VICRYL+ 2-0 TIES 18 UND VCP111G

## (undated) DEVICE — SOLUTION IRRIG 2B7127 .9NS 3000ML BAG

## (undated) DEVICE — BLADE KNIFE SURG 10 371110

## (undated) DEVICE — ESU PENCIL SMOKE EVAC W/ROCKER SWITCH 0703-047-000

## (undated) DEVICE — WIRE GUIDE 0.035"X145CM AMPLATZ SUPER STIFF STR M001465230

## (undated) DEVICE — GRASPER LAPAROSCOPIC EPIX 5MMX35CM C4130

## (undated) DEVICE — PREP POVIDONE-IODINE 7.5% SCRUB 4OZ BOTTLE MDS093945

## (undated) DEVICE — SU VICRYL+ 3-0 27IN SH UND VCP416H

## (undated) DEVICE — SU VICRYL+ 0 54 UNDYED VCP608H

## (undated) DEVICE — GOWN LG DISP 9515

## (undated) DEVICE — ENDO TROCAR SLEEVE KII Z-THREADED 05X100MM CTS02

## (undated) DEVICE — LASER FIBER HOLMIUM MOSES 200 D/F/L AC-10030100

## (undated) DEVICE — GLOVE SURG PI ULTRA TOUCH M SZ 6-1/2 LF

## (undated) DEVICE — CONTAINER URINE SPEC 4OZ STRL 1053

## (undated) DEVICE — KIT ENDO FIRST STEP DISINFECTANT 200ML W/POUCH EP-4

## (undated) DEVICE — SU SILK 2-0 FS-1 18" 685G

## (undated) DEVICE — SPONGE RAY-TEC 4X8" 7318

## (undated) DEVICE — PAD POS XL 1X20X40IN PINK PIGAZZI

## (undated) DEVICE — GLOVE SURGEON PI ORTHO SZ 6-1/2 LF

## (undated) DEVICE — GOWN IMPERVIOUS BREATHABLE SMART LG 89015

## (undated) DEVICE — GLOVE UNDER INDICATOR PI SZ 7.0 LF 41670

## (undated) DEVICE — CATH URETERAL OPEN END 5FRX70CM M0064002010

## (undated) DEVICE — SU MONOCRYL+ 4-0 18IN PS2 UND MCP496G

## (undated) DEVICE — TUBING SUCTION MEDI-VAC 1/4"X20' N620A

## (undated) DEVICE — MAT FLOOR SURGICAL 40X38 0702140238

## (undated) DEVICE — GLOVE BIOGEL PI ULTRATOUCH G SZ 7.5 42175

## (undated) DEVICE — TUBING SUCTION MEDI-VAC 1/4"X20' N620A - HE

## (undated) DEVICE — TUBING SMOKE EVAC PLUME PORT PP010CS

## (undated) DEVICE — GLOVE BIOGEL PI SZ 6.5 40865

## (undated) DEVICE — GLOVE BIOGEL PI SZ 7.0 40870

## (undated) DEVICE — ESU ELEC BLADE 6" COATED E1450-6

## (undated) DEVICE — TUBING SET THERMEDX UROLOGY SGL USE LL0006

## (undated) DEVICE — SUTURE VICRYL+ 3-0 18 SH/CR UND VCP864

## (undated) DEVICE — SUCTION MANIFOLD NEPTUNE 2 SYS 1 PORT 702-025-000

## (undated) DEVICE — STPL LINEAR 90 X 3.5MM TA9035S

## (undated) DEVICE — PREP SCRUB SOL EXIDINE 4% CHG 4OZ 29002-404

## (undated) DEVICE — SUTURE PDS 1 54IN TP1+ VIOLET PDP879G

## (undated) DEVICE — ESU LIGASURE LAPAROSCOPIC BLUNT TIP SEALER 5MMX37CM LF1837

## (undated) DEVICE — Device

## (undated) DEVICE — EVACUATOR BLADDER UROVAC LATEX M0067301250

## (undated) DEVICE — PREP CHLORAPREP 26ML TINTED HI-LITE ORANGE 930815

## (undated) DEVICE — DRSG GAUZE 4X4" 3033

## (undated) DEVICE — SYR 50ML CATH TIP W/O NDL 309620

## (undated) DEVICE — SHEATH URETERAL ACCESS NAVIGATOR HD 11/13FRX36CM M0062502220

## (undated) DEVICE — ENDO TROCAR FIRST ENTRY KII FIOS Z-THRD 12X100MM CTF73

## (undated) DEVICE — GOWN IMPERVIOUS BREATHABLE SMART XLG 89045

## (undated) DEVICE — ENDO TROCAR FIRST ENTRY KII FIOS Z-THRD 05X100MM CTF03

## (undated) DEVICE — CUSTOM PACK CYSTO PREFERRED SOT5BCYHEA

## (undated) DEVICE — GOWN XLG DISP 9545

## (undated) DEVICE — DRAPE IOBAN INCISE 23X17" 6650EZ

## (undated) DEVICE — GLOVE BIOGEL PI INDICATOR 8.0 LF 41680

## (undated) DEVICE — CUSTOM PACK LAP CHOLE SBA5BLCHEA

## (undated) DEVICE — CUSTOM PACK COLON CLOSING SBA5BCCHEA

## (undated) DEVICE — CATH TRAY FOLEY SURESTEP 16FR DRAIN BAG STATOCK A899916

## (undated) DEVICE — SUTURE VICRYL+ 2-0 27IN CT-1 UND VCP259H

## (undated) DEVICE — ADH SKIN CLOSURE PREMIERPRO EXOFIN 1.0ML 3470

## (undated) DEVICE — TIP CAUTERY L HOOK 36CM E377336C

## (undated) DEVICE — SUCTION TIP YANKAUER W/O VENT K86

## (undated) DEVICE — TOWEL SURG 16INW X 26INL WHITE STRL XRAY DETECTABLE X8314W

## (undated) DEVICE — BASKET NITINOL TIPLESS HALO  1.5FRX120CM 554120

## (undated) DEVICE — PLATE GROUNDING ADULT W/CORD 9165L

## (undated) DEVICE — SOL WATER IRRIG 3000ML BAG 2B7117

## (undated) DEVICE — LEGGINGS 31X48" LF KM89408

## (undated) DEVICE — GUIDEWIRE BENTSON FLEX TIP 0.035"X150CM M0066201250

## (undated) DEVICE — DRAPE POUCH INSTRUMENT 3 POCKET 1018L

## (undated) DEVICE — GLOVE UNDER INDICATOR PI SZ 6.5 LF 41665

## (undated) DEVICE — SYSTEM CLEARIFY VISUALIZATION 21-345

## (undated) DEVICE — STPL LINEAR CUT 75MM TLC75

## (undated) RX ORDER — LIDOCAINE HYDROCHLORIDE 10 MG/ML
INJECTION, SOLUTION EPIDURAL; INFILTRATION; INTRACAUDAL; PERINEURAL
Status: DISPENSED
Start: 2022-08-16

## (undated) RX ORDER — FENTANYL CITRATE-0.9 % NACL/PF 10 MCG/ML
PLASTIC BAG, INJECTION (ML) INTRAVENOUS
Status: DISPENSED
Start: 2022-08-30

## (undated) RX ORDER — FENTANYL CITRATE 50 UG/ML
INJECTION, SOLUTION INTRAMUSCULAR; INTRAVENOUS
Status: DISPENSED
Start: 2022-08-30

## (undated) RX ORDER — DEXAMETHASONE SODIUM PHOSPHATE 10 MG/ML
INJECTION INTRAMUSCULAR; INTRAVENOUS
Status: DISPENSED
Start: 2022-08-30

## (undated) RX ORDER — PROPOFOL 10 MG/ML
INJECTION, EMULSION INTRAVENOUS
Status: DISPENSED
Start: 2022-08-16

## (undated) RX ORDER — DEXAMETHASONE SODIUM PHOSPHATE 10 MG/ML
INJECTION INTRAMUSCULAR; INTRAVENOUS
Status: DISPENSED
Start: 2022-08-16

## (undated) RX ORDER — KETAMINE HYDROCHLORIDE 10 MG/ML
INJECTION INTRAMUSCULAR; INTRAVENOUS
Status: DISPENSED
Start: 2022-08-30

## (undated) RX ORDER — FENTANYL CITRATE 50 UG/ML
INJECTION, SOLUTION INTRAMUSCULAR; INTRAVENOUS
Status: DISPENSED
Start: 2022-08-16

## (undated) RX ORDER — ALBUTEROL SULFATE 90 UG/1
AEROSOL, METERED RESPIRATORY (INHALATION)
Status: DISPENSED
Start: 2022-08-30

## (undated) RX ORDER — CEFAZOLIN SODIUM 1 G/3ML
INJECTION, POWDER, FOR SOLUTION INTRAMUSCULAR; INTRAVENOUS
Status: DISPENSED
Start: 2022-08-16

## (undated) RX ORDER — PROPOFOL 10 MG/ML
INJECTION, EMULSION INTRAVENOUS
Status: DISPENSED
Start: 2022-08-30

## (undated) RX ORDER — ONDANSETRON 2 MG/ML
INJECTION INTRAMUSCULAR; INTRAVENOUS
Status: DISPENSED
Start: 2022-08-30